# Patient Record
Sex: FEMALE | Race: WHITE | NOT HISPANIC OR LATINO | Employment: OTHER | URBAN - METROPOLITAN AREA
[De-identification: names, ages, dates, MRNs, and addresses within clinical notes are randomized per-mention and may not be internally consistent; named-entity substitution may affect disease eponyms.]

---

## 2017-05-01 ENCOUNTER — TRANSCRIBE ORDERS (OUTPATIENT)
Dept: LAB | Facility: HOSPITAL | Age: 82
End: 2017-05-01

## 2017-05-01 ENCOUNTER — APPOINTMENT (OUTPATIENT)
Dept: LAB | Facility: HOSPITAL | Age: 82
End: 2017-05-01
Payer: COMMERCIAL

## 2017-05-01 ENCOUNTER — GENERIC CONVERSION - ENCOUNTER (OUTPATIENT)
Dept: OTHER | Facility: OTHER | Age: 82
End: 2017-05-01

## 2017-05-01 DIAGNOSIS — I10 ESSENTIAL (PRIMARY) HYPERTENSION: ICD-10-CM

## 2017-05-01 DIAGNOSIS — E78.5 HYPERLIPIDEMIA: ICD-10-CM

## 2017-05-01 DIAGNOSIS — M85.80 OTHER SPECIFIED DISORDERS OF BONE DENSITY AND STRUCTURE, UNSPECIFIED SITE: ICD-10-CM

## 2017-05-01 DIAGNOSIS — E11.65 TYPE 2 DIABETES MELLITUS WITH HYPERGLYCEMIA (HCC): ICD-10-CM

## 2017-05-01 LAB
ALBUMIN SERPL BCP-MCNC: 3.4 G/DL (ref 3.5–5)
ALP SERPL-CCNC: 74 U/L (ref 46–116)
ALT SERPL W P-5'-P-CCNC: 18 U/L (ref 12–78)
ANION GAP SERPL CALCULATED.3IONS-SCNC: 7 MMOL/L (ref 4–13)
AST SERPL W P-5'-P-CCNC: 19 U/L (ref 5–45)
BASOPHILS # BLD AUTO: 0.06 THOUSANDS/ΜL (ref 0–0.1)
BASOPHILS NFR BLD AUTO: 1 % (ref 0–1)
BILIRUB SERPL-MCNC: 0.49 MG/DL (ref 0.2–1)
BUN SERPL-MCNC: 16 MG/DL (ref 5–25)
CALCIUM SERPL-MCNC: 9.5 MG/DL (ref 8.3–10.1)
CHLORIDE SERPL-SCNC: 106 MMOL/L (ref 100–108)
CHOLEST SERPL-MCNC: 155 MG/DL (ref 50–200)
CO2 SERPL-SCNC: 29 MMOL/L (ref 21–32)
CREAT SERPL-MCNC: 0.92 MG/DL (ref 0.6–1.3)
EOSINOPHIL # BLD AUTO: 0.15 THOUSAND/ΜL (ref 0–0.61)
EOSINOPHIL NFR BLD AUTO: 2 % (ref 0–6)
ERYTHROCYTE [DISTWIDTH] IN BLOOD BY AUTOMATED COUNT: 12.3 % (ref 11.6–15.1)
EST. AVERAGE GLUCOSE BLD GHB EST-MCNC: 169 MG/DL
GFR SERPL CREATININE-BSD FRML MDRD: 58.6 ML/MIN/1.73SQ M
GLUCOSE P FAST SERPL-MCNC: 95 MG/DL (ref 65–99)
HBA1C MFR BLD: 7.5 % (ref 4.2–6.3)
HCT VFR BLD AUTO: 44.5 % (ref 34.8–46.1)
HDLC SERPL-MCNC: 41 MG/DL (ref 40–60)
HGB BLD-MCNC: 14.9 G/DL (ref 11.5–15.4)
LDLC SERPL CALC-MCNC: 77 MG/DL (ref 0–100)
LYMPHOCYTES # BLD AUTO: 1.62 THOUSANDS/ΜL (ref 0.6–4.47)
LYMPHOCYTES NFR BLD AUTO: 23 % (ref 14–44)
MCH RBC QN AUTO: 30.6 PG (ref 26.8–34.3)
MCHC RBC AUTO-ENTMCNC: 33.5 G/DL (ref 31.4–37.4)
MCV RBC AUTO: 91 FL (ref 82–98)
MONOCYTES # BLD AUTO: 0.52 THOUSAND/ΜL (ref 0.17–1.22)
MONOCYTES NFR BLD AUTO: 7 % (ref 4–12)
NEUTROPHILS # BLD AUTO: 4.65 THOUSANDS/ΜL (ref 1.85–7.62)
NEUTS SEG NFR BLD AUTO: 67 % (ref 43–75)
NRBC BLD AUTO-RTO: 0 /100 WBCS
PLATELET # BLD AUTO: 213 THOUSANDS/UL (ref 149–390)
PMV BLD AUTO: 11.8 FL (ref 8.9–12.7)
POTASSIUM SERPL-SCNC: 4.5 MMOL/L (ref 3.5–5.3)
PROT SERPL-MCNC: 7.2 G/DL (ref 6.4–8.2)
RBC # BLD AUTO: 4.87 MILLION/UL (ref 3.81–5.12)
SODIUM SERPL-SCNC: 142 MMOL/L (ref 136–145)
TRIGL SERPL-MCNC: 186 MG/DL
WBC # BLD AUTO: 7.01 THOUSAND/UL (ref 4.31–10.16)

## 2017-05-01 PROCEDURE — 85025 COMPLETE CBC W/AUTO DIFF WBC: CPT

## 2017-05-01 PROCEDURE — 80053 COMPREHEN METABOLIC PANEL: CPT

## 2017-05-01 PROCEDURE — 83036 HEMOGLOBIN GLYCOSYLATED A1C: CPT

## 2017-05-01 PROCEDURE — 80061 LIPID PANEL: CPT

## 2017-05-01 PROCEDURE — 36415 COLL VENOUS BLD VENIPUNCTURE: CPT

## 2017-05-10 ENCOUNTER — ALLSCRIPTS OFFICE VISIT (OUTPATIENT)
Dept: OTHER | Facility: OTHER | Age: 82
End: 2017-05-10

## 2017-05-10 ENCOUNTER — GENERIC CONVERSION - ENCOUNTER (OUTPATIENT)
Dept: OTHER | Facility: OTHER | Age: 82
End: 2017-05-10

## 2017-05-16 ENCOUNTER — HOSPITAL ENCOUNTER (OUTPATIENT)
Dept: BONE DENSITY | Facility: MEDICAL CENTER | Age: 82
Discharge: HOME | End: 2017-05-16
Payer: COMMERCIAL

## 2017-05-16 DIAGNOSIS — M85.80 SENILE OSTEOPENIA: ICD-10-CM

## 2017-05-16 DIAGNOSIS — M85.80 OTHER SPECIFIED DISORDERS OF BONE DENSITY AND STRUCTURE, UNSPECIFIED SITE: ICD-10-CM

## 2017-05-16 PROCEDURE — 77080 DXA BONE DENSITY AXIAL: CPT

## 2017-05-18 ENCOUNTER — GENERIC CONVERSION - ENCOUNTER (OUTPATIENT)
Dept: OTHER | Facility: OTHER | Age: 82
End: 2017-05-18

## 2017-10-30 ENCOUNTER — GENERIC CONVERSION - ENCOUNTER (OUTPATIENT)
Dept: OTHER | Facility: OTHER | Age: 82
End: 2017-10-30

## 2017-10-30 ENCOUNTER — TRANSCRIBE ORDERS (OUTPATIENT)
Dept: LAB | Facility: HOSPITAL | Age: 82
End: 2017-10-30

## 2017-10-30 ENCOUNTER — APPOINTMENT (OUTPATIENT)
Dept: LAB | Facility: HOSPITAL | Age: 82
End: 2017-10-30
Payer: COMMERCIAL

## 2017-10-30 DIAGNOSIS — I10 ESSENTIAL (PRIMARY) HYPERTENSION: ICD-10-CM

## 2017-10-30 DIAGNOSIS — E78.5 HYPERLIPIDEMIA: ICD-10-CM

## 2017-10-30 DIAGNOSIS — E55.9 VITAMIN D DEFICIENCY: ICD-10-CM

## 2017-10-30 DIAGNOSIS — E11.65 TYPE 2 DIABETES MELLITUS WITH HYPERGLYCEMIA (HCC): ICD-10-CM

## 2017-10-30 DIAGNOSIS — M85.80 OTHER SPECIFIED DISORDERS OF BONE DENSITY AND STRUCTURE: ICD-10-CM

## 2017-10-30 DIAGNOSIS — M85.80 OTHER SPECIFIED DISORDERS OF BONE DENSITY AND STRUCTURE, UNSPECIFIED SITE: ICD-10-CM

## 2017-10-30 LAB
25(OH)D3 SERPL-MCNC: 49.8 NG/ML (ref 30–100)
ALBUMIN SERPL BCP-MCNC: 3.6 G/DL (ref 3.5–5)
ALP SERPL-CCNC: 78 U/L (ref 46–116)
ALT SERPL W P-5'-P-CCNC: 19 U/L (ref 12–78)
ANION GAP SERPL CALCULATED.3IONS-SCNC: 5 MMOL/L (ref 4–13)
AST SERPL W P-5'-P-CCNC: 21 U/L (ref 5–45)
BILIRUB SERPL-MCNC: 0.48 MG/DL (ref 0.2–1)
BUN SERPL-MCNC: 18 MG/DL (ref 5–25)
CALCIUM SERPL-MCNC: 9.1 MG/DL (ref 8.3–10.1)
CHLORIDE SERPL-SCNC: 106 MMOL/L (ref 100–108)
CHOLEST SERPL-MCNC: 135 MG/DL (ref 50–200)
CO2 SERPL-SCNC: 29 MMOL/L (ref 21–32)
CREAT SERPL-MCNC: 1.05 MG/DL (ref 0.6–1.3)
CREAT UR-MCNC: 102 MG/DL
EST. AVERAGE GLUCOSE BLD GHB EST-MCNC: 171 MG/DL
GFR SERPL CREATININE-BSD FRML MDRD: 50 ML/MIN/1.73SQ M
GLUCOSE P FAST SERPL-MCNC: 101 MG/DL (ref 65–99)
HBA1C MFR BLD: 7.6 % (ref 4.2–6.3)
HDLC SERPL-MCNC: 45 MG/DL (ref 40–60)
LDLC SERPL CALC-MCNC: 45 MG/DL (ref 0–100)
MICROALBUMIN UR-MCNC: <5 MG/L (ref 0–20)
MICROALBUMIN/CREAT 24H UR: <5 MG/G CREATININE (ref 0–30)
POTASSIUM SERPL-SCNC: 4.2 MMOL/L (ref 3.5–5.3)
PROT SERPL-MCNC: 7.1 G/DL (ref 6.4–8.2)
SODIUM SERPL-SCNC: 140 MMOL/L (ref 136–145)
TRIGL SERPL-MCNC: 224 MG/DL
TSH SERPL DL<=0.05 MIU/L-ACNC: 2.7 UIU/ML (ref 0.36–3.74)

## 2017-10-30 PROCEDURE — 83036 HEMOGLOBIN GLYCOSYLATED A1C: CPT

## 2017-10-30 PROCEDURE — 80053 COMPREHEN METABOLIC PANEL: CPT

## 2017-10-30 PROCEDURE — 82043 UR ALBUMIN QUANTITATIVE: CPT

## 2017-10-30 PROCEDURE — 82306 VITAMIN D 25 HYDROXY: CPT

## 2017-10-30 PROCEDURE — 36415 COLL VENOUS BLD VENIPUNCTURE: CPT

## 2017-10-30 PROCEDURE — 84443 ASSAY THYROID STIM HORMONE: CPT

## 2017-10-30 PROCEDURE — 80061 LIPID PANEL: CPT

## 2017-10-30 PROCEDURE — 82570 ASSAY OF URINE CREATININE: CPT

## 2017-10-31 LAB
LEFT EYE DIABETIC RETINOPATHY: NORMAL
RIGHT EYE DIABETIC RETINOPATHY: NORMAL

## 2017-11-14 ENCOUNTER — ALLSCRIPTS OFFICE VISIT (OUTPATIENT)
Dept: OTHER | Facility: OTHER | Age: 82
End: 2017-11-14

## 2017-11-15 NOTE — PROGRESS NOTES
Assessment    1  Diabetes mellitus type 2, uncontrolled (250 02) (E11 65)   2  Hypertension (401 9) (I10)   3  Hyperlipidemia (272 4) (E78 5)   4  Osteopenia (733 90) (M85 80)   5  Vitamin D deficiency (268 9) (E55 9)    Plan  Diabetes mellitus type 2, uncontrolled, Hyperlipidemia, Hypertension, Osteopenia,Vitamin D deficiency    · (1) CBC/PLT/DIFF; Status:Active; Requested for:04May2018;    · (1) COMPREHENSIVE METABOLIC PANEL; Status:Active; Requested for:04May2018;    · (1) HEMOGLOBIN A1C; Status:Active; Requested for:04May2018;    · (1) LIPID PANEL FASTING W DIRECT LDL REFLEX; Status:Active; Requestedfor:04May2018; Health Maintenance    · *VB - Fall Risk Assessment  (Dx Z13 89 Screen for Neurologic Disorder);Status:Complete;   Done: 84YUE9084 08:45AM   · *VB-Depression Screening; Status:Complete;   Done: 75BXS4838 08:45AM  Hyperlipidemia    · Simvastatin 80 MG Oral Tablet; take 1 tablet by mouth every day  Hypertension    · Losartan Potassium 50 MG Oral Tablet; take 1 tablet by mouth every day  Type 2 diabetes mellitus    · Levemir FlexTouch 100 UNIT/ML Subcutaneous Solution Pen-injector; CQLHVH57 UNITS IN THE MORNING AND 34 UNITS AT BEDTIME   · NovoLOG FlexPen 100 UNIT/ML Subcutaneous Solution Pen-injector; HJALMV20 UNITS SUBCUT 3 TIMES A DAY AND ISS ALSO  Vitamin D deficiency    · Vitamin D3 5000 UNIT Oral Capsule; TAKE AS DIRECTED  Unlinked    · Calcium 1200 1481-7601 MG-UNIT Oral Tablet Chewable    Discussion/Summary    Reviewed lab in 10/39055 6 stable<5 msvzff186/224/45/45 low fat upmowhwzhw41 8 normalcurrent meds  specialist as scheduled  flu shot this season  prevnar 4/2015  Got pneumovax 2016  zostavax in 10/2014 5/2017, osteopenia, stable  Pt is on calcium/vitD and exercise regularly  precautions  in 6 months with labs  Possible side effects of new medications were reviewed with the patient/guardian today  The treatment plan was reviewed with the patient/guardian   The patient/guardian understands and agrees with the treatment plan      Chief Complaint  Patient presents for a 6 month follow up      History of Present Illness  Pt is here by herself  HgA1C 7 6 stable from last time  Pt states she is on levemir 37u am and 34u pm  She use novolog ISS, she does not use 20u before meals because hypoglycemia episode  opthalmology in [facility]  Had Left cataract surgery in 5/2014  Right eye cannot see  Last exam was 10/2017  [facility] retinal specialist also per pt podiatry Dr Maurice Live Q 3-4 months  Refused to check foot today  at home 130/80  Denies SOB, CP, headache etc lives by herself  Does all ADL's  Still driving  recent falls  depression  The patient states her hyperlipidemia has been under good control since the last visit  Comorbid Illnesses: diabetes mellitus-- and-- hypertension  Symptoms: The patient is currently asymptomatic  Medications: the patient is adherent with her medication regimen  -- She denies medication side effects  the patient's LDL goal is 100 mg/dL  -- the patient's last LDL was 45 mg/dL  -- 10/2017  The patient presents for follow-up of essential hypertension  The patient states she has been doing well with her blood pressure control since the last visit  Comorbid Illnesses: retinopathy  Symptoms: The patient is currently asymptomatic  Home monitoring: The patient checks her blood pressure sporadically  Blood pressure control has been good  Lifestyle: Diet: She consumes a diverse and healthy diet  Weight Issues: She has weight concerns  Exercise: She exercises regularly  Smoking: She does not use tobacco Alcohol: She denies alcohol use  Drug Use: She denies drug use  Medications: the patient is adherent with her medication regimen  -- She denies medication side effects  The patient states she has been stable with her Type II Diabetes control since the last visit  Comorbid Illnesses: hypertension-- and-- hyperlipidemia   Complications: retinopathy, but-- no peripheral neuropathy,-- no nephropathy-- and-- no coronary artery disease  Symptoms: The patient is currently asymptomatic  Home monitoring: The patient checks her blood sugar sporadically  Lifestyle: Diet: She consumes a diverse and healthy diet  Weight Issues: She has weight concerns  Exercise: She exercises regularly  Smoking: She does not use tobacco Alcohol: She denies alcohol use  Drug Use: She denies drug use  Medications: the patient is adherent with her medication regimen  -- She denies medication side effects  Review of Systems   Constitutional: No fever, no chills, feels well, no tiredness, no recent weight gain or weight loss  ENT: no complaints of earache, no loss of hearing, no nose bleeds, no nasal discharge, no sore throat, no hoarseness  Cardiovascular: No complaints of slow heart rate, no fast heart rate, no chest pain, no palpitations, no leg claudication, no lower extremity edema  Respiratory: No complaints of shortness of breath, no wheezing, no cough, no SOB on exertion, no orthopnea, no PND  Gastrointestinal: No complaints of abdominal pain, no constipation, no nausea or vomiting, no diarrhea, no bloody stools  Musculoskeletal: No complaints of arthralgias, no myalgias, no joint swelling or stiffness, no limb pain or swelling  Preventive Quality 65 and Older: The patient is currently asymptomatic Symptoms Include: no recent fall       Active Problems  1  Allergic rhinitis (477 9) (J30 9)   2  Alopecia (704 00) (L65 9)   3  Carpal tunnel syndrome, unspecified laterality (354 0) (G56 00)   4  Diabetes mellitus type 2, uncontrolled (250 02) (E11 65)   5  Esophageal reflux (530 81) (K21 9)   6  Gallstone (574 20) (K80 20)   7  Glaucoma (365 9) (H40 9)   8  Hyperlipidemia (272 4) (E78 5)   9  Hypertension (401 9) (I10)   10  Medicare annual wellness visit, initial (V70 0) (Z00 00)   11  Need for pneumococcal vaccination (V03 82) (Z23)   12  Need for prophylactic vaccination and inoculation against influenza (V04 81) (Z23)   13  Need for vaccination with 13-polyvalent pneumococcal conjugate vaccine (V03 82) (Z23)   14  Osteoarthritis (715 90) (M19 90)   15  Osteopenia (733 90) (M85 80)   16  Screening for osteoporosis (V82 81) (Z13 820)   17  Spinal stenosis (724 00) (M48 00)   18  Type 2 diabetes mellitus (250 00) (E11 9)   19  Type 2 diabetes mellitus with hyperglycemia (250 00) (E11 65)   20  Visit for pre-operative examination (V72 84) (Z01 818)   21  Vitamin D deficiency (268 9) (E55 9)    Past Medical History  1  History of Acute cholecystitis (575 0) (K81 0)   2  History of Carpal tunnel syndrome, unspecified laterality (354 0) (G56 00)   3  History of Choledocholithiasis (574 50) (K80 50)   4  History of CT Liver Cyst Multiple   5  History of Diverticulosis (562 10) (K57 90)   6  History of diverticulitis of colon (V12 79) (Z87 19)   7  History of sciatica (V12 49) (Z86 69)   8  History of Inguinal hernia (550 90) (K40 90)    Surgical History  1  History of Diagnostic Esophagogastroduodenoscopy   2  History of Hemorrhoidectomy   3  History of Neuroplasty Median Nerve At Carpal Tunnel   4  History of Rectal Surgery    Family History  Mother    1  Family history of Acute Myocardial Infarction (V17 3)   2  Family history of Coronary Artery Disease (V17 49)   3  Family history of Diabetes Mellitus (V18 0)   4  Family history of Hypertension (V17 49)   5  Family history of Stroke Syndrome (V17 1)  Father    6  Family history of Stroke Syndrome (V17 1)    Social History     · Denied: History of Alcohol   · Always uses seat belt   · Never A Smoker   · No drug use   · Retired   ·     Current Meds   1  Alphagan P 0 1 % Ophthalmic Solution; Instill one drop to the right eye 3 times daily; Therapy: 36RCP3569 to )  Requested for: 27Uis5404; Last Rx:18Apr2013 Ordered   2  BD Pen Needle Short U/F 31G X 8 MM Miscellaneous; 4 x daily; Therapy: 41GBL5876 to ([phone])  Requested for: 0490 39 07 81;  Last Rx:29Oct2015 Ordered   3  Calcium 1200 3280-1099 MG-UNIT Oral Tablet Chewable; Therapy: 56MBP0683 to Recorded   4  Latanoprost 0 005 % Ophthalmic Solution; Instill one drop in both eyes at bedtime; Therapy: 86Vmq8690 to (Evaluate:15Oct2013); Last Rx:18Apr2013 Ordered   5  Levemir FlexTouch 100 UNIT/ML Subcutaneous Solution Pen-injector; INJECT 37 UNITS IN THE MORNING AND 34 UNITS AT BEDTIME; Therapy: 81NII0534 to (Evaluate:20Apr2018)  Requested for: 38COX3769; Last Rx:04Zvx3060 Ordered   6  Losartan Potassium 50 MG Oral Tablet; take 1 tablet by mouth every day; Therapy: 09LOC8669 to (Evaluate:72Qfp5446)  Requested for: 00Wed5133; Last Rx:79Ylm3099 Ordered   7  NovoLOG FlexPen 100 UNIT/ML Subcutaneous Solution Pen-injector; INJECT 20 UNITS SUBCUT 3 TIMES A DAY AND ISS ALSO; Therapy: 59FFV6848 to (Evaluate:11Mar2017)  Requested for: 41SEZ4535; Last Rx:81Ztb8852 Ordered   8  OneTouch Verio In [employer]; TEST 3 TIMES DAILY; Therapy: 28Apr2017 to ([phone])  Requested for: 28Apr2017; Last Rx:28Apr2017 Ordered   9  Simvastatin 80 MG Oral Tablet; take 1 tablet by mouth every day; Therapy: 86HFC7662 to (Evaluate:60Ood9271)  Requested for: 79Szz0178; Last Rx:44Zzr6314 Ordered   10  Vitamin D3 5000 UNIT Oral Capsule; TAKE AS DIRECTED; Therapy: 53WRA8474 to (Evaluate:06Nov2017); Last Rx:29Ovk8609 Ordered    Allergies  1  ACE Inhibitors   2  Glucophage TABS    Vitals  Vital Signs    Recorded: 19BLD4672 08:20AM   Temperature 97 6 F, Tympanic   Heart Rate 74, L Radial   Pulse Quality Normal, L Radial   Respiration Quality Normal   Respiration 16   Systolic 678, LUE, Sitting   Diastolic 62, LUE, Sitting   Height 5 ft 1 in   Weight 148 lb 8 oz   BMI Calculated 28 06   BSA Calculated 1 66   Pain Scale 0       Physical Exam   Constitutional  General appearance: No acute distress, well appearing and well nourished  Pulmonary  Respiratory effort: No increased work of breathing or signs of respiratory distress     Auscultation of lungs: Clear to auscultation  Cardiovascular  Auscultation of heart: Normal rate and rhythm, normal S1 and S2, without murmurs  Examination of extremities for edema and/or varicosities: Normal    Carotid pulses: Normal    Abdomen  Abdomen: Non-tender, no masses  Liver and spleen: No hepatomegaly or splenomegaly  Lymphatic  Palpation of lymph nodes in neck: No lymphadenopathy  Musculoskeletal  Gait and station: Normal          Results/Data  (1) COMPREHENSIVE METABOLIC PANEL 85YVH1096 39:09FJ Ava Elliott   TW Order Number: [id number]     Test Name Result Flag Reference   SODIUM 140 mmol/L  136-145   POTASSIUM 4 2 mmol/L  3 5-5 3   CHLORIDE 106 mmol/L  100-108   CARBON DIOXIDE 29 mmol/L  21-32   ANION GAP (CALC) 5 mmol/L  4-13   BLOOD UREA NITROGEN 18 mg/dL  5-25   CREATININE 1 05 mg/dL  0 60-1 30   Standardized to IDMS reference method   CALCIUM 9 1 mg/dL  8 3-10 1   BILI, TOTAL 0 48 mg/dL  0 20-1 00   ALK PHOSPHATAS 78 U/L     ALT (SGPT) 19 U/L  12-78   Specimen collection should occur prior to Sulfasalazine and/or Sulfapyridine administration due to the potential for falsely depressed results  AST(SGOT) 21 U/L  5-45   Specimen collection should occur prior to Sulfasalazine administration due to the potential for falsely depressed results  ALBUMIN 3 6 g/dL  3 5-5 0   TOTAL PROTEIN 7 1 g/dL  6 4-8 2   eGFR 50 ml/min/1 73sq m       [facility] Disease Education Program recommendations are as follows: GFR calculation is accurate only with a steady state creatinine Chronic Kidney disease less than 60 ml/min/1 73 sq  meters Kidney failure less than 15 ml/min/1 73 sq  meters  GLUCOSE FASTING 101 mg/dL H 65-99   Specimen collection should occur prior to Sulfasalazine administration due to the potential for falsely depressed results  Specimen collection should occur prior to Sulfapyridine administration due to the potential for falsely elevated results       (1) HEMOGLOBIN A1C 36PVO4723 08: Gretchen Guajardo    Order Number: [id number]     Test Name Result Flag Reference   HEMOGLOBIN A1C 7 6 % H 4 2-6 3   EST  AVG  GLUCOSE 171 mg/dl       (1) LIPID PANEL FASTING W DIRECT LDL REFLEX 30Oct2017 08:03AM Momo Panda    Order Number: [id number]     Test Name Result Flag Reference   CHOLESTEROL 135 mg/dL     LDL CHOLESTEROL CALCULATED 45 mg/dL  0-100     Triglyceride:       Normal <150 mg/dl  Borderline High 150-199 mg/dl  High 200-499 mg/dl  Very High >499 mg/dl   Cholesterol:      Desirable <200 mg/dl   Borderline High 200-239 mg/dl   High >239 mg/dl   HDL Cholesterol:      High>59 mg/dL   Low <41 mg/dL   HDL Cholesterol:      High>59 mg/dL   Low <41 mg/dL   This screening LDL is a calculated result  It does not have the accuracy of the Direct Measured LDL in the monitoring of patients with hyperlipidemia and/or statin therapy  Direct Measure LDL (CSR557) must be ordered separately in these patients  TRIGLYCERIDES 224 mg/dL H <=150   Specimen collection should occur prior to N-Acetylcysteine or Metamizole administration due to the potential for falsely depressed results  HDL,DIRECT 45 mg/dL  40-60   Specimen collection should occur prior to Metamizole administration due to the potential for falsley depressed results  (1) MICROALBUMIN CREATININE RATIO, RANDOM URINE 30Oct2017 08:03AM Momo Panda    Order Number: [id number]     Test Name Result Flag Reference   MICROALBUMIN/ CREAT R <5 mg/g creatinine  0-30   MICROALBUMIN,URINE <5 0 mg/L  0 0-20 0   CREATININE URINE 102 0 mg/dL       (1) TSH WITH FT4 REFLEX 30Oct2017 08:03AM Momo Panda    Order Number: [id number]     Test Name Result Flag Reference   TSH 2 700 uIU/mL  0 358-3 740     Patients undergoing fluorescein dye angiography may retain small amounts of fluorescein in the body for 48-72 hours post procedure  Samples containing fluorescein can produce falsely depressed TSH values   If the patient had this procedure,a specimen should be resubmitted post fluorescein clearance  The recommended reference ranges for TSH during pregnancy are as follows: First trimester 0 1 to 2 5 uIU/mL Second trimester  0 2 to 3 0 uIU/mL Third trimester 0 3 to 3 0 uIU/m     (1) VITAMIN D 25-HYDROXY 30Oct2017 08:03AM Andrzej Lucia    Order Number: [id number]     Test Name Result Flag Reference   VIT D 25-HYDROX 49 8 ng/mL  30 0-100 0     This assay is a certified procedure of the CDC Vitamin D Standardization Certification Program (VDSCP)   Deficiency <20ng/ml  Insufficiency 20-30ng/ml  Sufficient  ng/ml   *Patients undergoing fluorescein dye angiography may retain small amounts of fluorescein in the body for 48-72 hours post procedure  Samples containing fluorescein can produce falsely elevated Vitamin D values  If the patient had this procedure, a specimen should be resubmitted post fluorescein clearance  Health Management  Type 2 diabetes mellitus   *VB - Eye Exam; every 2 years; Last 71TNO4730; Next Due: 48RRL8283; Active    Signatures   Electronically signed by :  Melody Woods MD; Nov 14 2017  8:45AM EST                       (Author)

## 2018-01-09 NOTE — RESULT NOTES
Message   DW pt on 11/10/2016 OV  Verified Results  (1) COMPREHENSIVE METABOLIC PANEL 38DTB8027 45:81LH [facility] Order Number: [id number]     Test Name Result Flag Reference   GLUCOSE,RANDM 109 mg/dL     If the patient is fasting, the ADA then defines impaired fasting glucose as > 100 mg/dL and diabetes as > or equal to 123 mg/dL  SODIUM 139 mmol/L  136-145   POTASSIUM 4 5 mmol/L  3 5-5 3   CHLORIDE 103 mmol/L  100-108   CARBON DIOXIDE 32 mmol/L  21-32   ANION GAP (CALC) 4 mmol/L  4-13   BLOOD UREA NITROGEN 18 mg/dL  5-25   CREATININE 1 08 mg/dL  0 60-1 30   Standardized to IDMS reference method   CALCIUM 9 0 mg/dL  8 3-10 1   BILI, TOTAL 0 51 mg/dL  0 20-1 00   ALK PHOSPHATAS 83 U/L     ALT (SGPT) 21 U/L  12-78   AST(SGOT) 17 U/L  5-45   ALBUMIN 3 8 g/dL  3 5-5 0   TOTAL PROTEIN 7 7 g/dL  6 4-8 2   eGFR Non-African American 48 8 ml/min/1 73sq [facility] [employer] Disease Education Program recommendations are as follows:  GFR calculation is accurate only with a steady state creatinine  Chronic Kidney disease less than 60 ml/min/1 73 sq  meters  Kidney failure less than 15 ml/min/1 73 sq  meters  (1) HEMOGLOBIN A1C 23Oct2016 08:14AM [facility] Order Number: [id number]     Test Name Result Flag Reference   HEMOGLOBIN A1C 7 6 % H 4 2-6 3   EST  AVG   GLUCOSE 171 mg/dl       (1) LIPID PANEL FASTING W DIRECT LDL REFLEX 23Oct2016 08:14AM [facility] Order Number: [id number]     Test Name Result Flag Reference   CHOLESTEROL 163 mg/dL     LDL CHOLESTEROL CALCULATED 80 mg/dL  0-100   Triglyceride:         Normal              <150 mg/dl       Borderline High    150-199 mg/dl       High               200-499 mg/dl       Very High          >499 mg/dl  Cholesterol:         Desirable        <200 mg/dl      Borderline High  200-239 mg/dl      High             >239 mg/dl  HDL Cholesterol:        High    >59 mg/dL      Low     <41 mg/dL  LDL Cholesterol:        Optimal          <100 mg/dl        Near Optimal     100-129 mg/dl        Above Optimal          Borderline High   130-159 mg/dl          High              160-189 mg/dl          Very High        >189 mg/dl  LDL CALCULATED:    This screening LDL is a calculated result  It does not have the accuracy of the Direct Measured LDL in the monitoring of patients with hyperlipidemia and/or statin therapy  Direct Measure LDL (MNO251) must be ordered separately in these patients  TRIGLYCERIDES 190 mg/dL H <=150   Specimen collection should occur prior to N-Acetylcysteine or Metamizole administration due to the potential for falsely depressed results  HDL,DIRECT 45 mg/dL  40-60   Specimen collection should occur prior to Metamizole administration due to the potential for falsely depressed results  (1) MICROALBUMIN CREATININE RATIO, RANDOM URINE 23Oct2016 08:14AM [employer] Order Number: [id number]     Test Name Result Flag Reference   MICROALBUMIN/ CREAT R 6 mg/g creatinine  0-30   MICROALBUMIN,URINE 5 5 mg/L  0 0-20 0   CREATININE URINE 87 3 mg/dL       (1) TSH WITH FT4 REFLEX 23Oct2016 08:14AM [employer] Order Number: [id number]     Test Name Result Flag Reference   TSH 2 650 uIU/mL  0 358-3 740   Patients undergoing fluorescein dye angiography may retain small amounts of fluorescein in the body for 48-72 hours post procedure  Samples containing fluorescein can produce falsely depressed TSH values  If the patient had this procedure,a specimen should be resubmitted post fluorescein clearance            The recommended reference ranges for TSH during pregnancy are as follows:  First trimester 0 1 to 2 5 uIU/mL  Second trimester  0 2 to 3 0 uIU/mL  Third trimester 0 3 to 3 0 uIU/m     (1) VITAMIN D 25-HYDROXY 23Oct2016 08:14AM [employer] Order Number: [id number]     Test Name Result Flag Reference   VIT D 25-HYDROX 63 1 ng/mL  30 0-100 0   This assay is a certified procedure of the CDC Vitamin D Standardization Certification Program (VDSCP)     Deficiency <20ng/ml   Insufficiency 20-30ng/ml   Sufficient  ng/ml     *Patients undergoing fluorescein dye angiography may retain small amounts of fluorescein in the body for 48-72 hours post procedure  Samples containing fluorescein can produce falsely elevated Vitamin D values  If the patient had this procedure, a specimen should be resubmitted post fluorescein clearance

## 2018-01-10 NOTE — RESULT NOTES
Message   DW pt on 5/5/2016 OV  Verified Results  (1) CBC/PLT/DIFF 20Apr2016 07:25AM Michelle Martínez     Test Name Result Flag Reference   WBC COUNT 6 54 Thousand/uL  4 31-10 16   RBC COUNT 4 98 Million/uL  3 81-5 12   HEMOGLOBIN 14 9 g/dL  11 5-15 4   HEMATOCRIT 45 1 %  34 8-46  1   MCV 91 fL  82-98   MCH 29 9 pg  26 8-34 3   MCHC 33 0 g/dL  31 4-37 4   RDW 12 0 %  11 6-15 1   MPV 10 7 fL  8 9-12 7   PLATELET COUNT 700 Thousands/uL  149-390   nRBC AUTOMATED 0 /100 WBCs     NEUTROPHILS RELATIVE PERCENT 58 %  43-75   LYMPHOCYTES RELATIVE PERCENT 29 %  14-44   MONOCYTES RELATIVE PERCENT 8 %  4-12   EOSINOPHILS RELATIVE PERCENT 4 %  0-6   BASOPHILS RELATIVE PERCENT 1 %  0-1   NEUTROPHILS ABSOLUTE COUNT 3 73 Thousands/µL  1 85-7 62   LYMPHOCYTES ABSOLUTE COUNT 1 91 Thousands/µL  0 60-4 47   MONOCYTES ABSOLUTE COUNT 0 54 Thousand/µL  0 17-1 22   EOSINOPHILS ABSOLUTE COUNT 0 26 Thousand/µL  0 00-0 61   BASOPHILS ABSOLUTE COUNT 0 09 Thousands/µL  0 00-0 10     (1) COMPREHENSIVE METABOLIC PANEL 38EET7967 62:72CU Michelle Martínez   National Kidney Disease Education Program recommendations are as follows:  GFR calculation is accurate only with a steady state creatinine  Chronic Kidney disease less than 60 ml/min/1 73 sq  meters  Kidney failure less than 15 ml/min/1 73 sq  meters  Test Name Result Flag Reference   GLUCOSE,RANDM 99 mg/dL     If the patient is fasting, the ADA then defines impaired fasting glucose as > 100 mg/dL and diabetes as > or equal to 123 mg/dL     SODIUM 140 mmol/L  136-145   POTASSIUM 4 3 mmol/L  3 5-5 3   CHLORIDE 105 mmol/L  100-108   CARBON DIOXIDE 27 mmol/L  21-32   ANION GAP (CALC) 8 mmol/L  4-13   BLOOD UREA NITROGEN 24 mg/dL  5-25   CREATININE 0 91 mg/dL  0 60-1 30   Standardized to IDMS reference method   CALCIUM 9 1 mg/dL  8 3-10 1   BILI, TOTAL 0 60 mg/dL  0 20-1 00   ALK PHOSPHATAS 78 U/L     ALT (SGPT) 19 U/L  12-78   AST(SGOT) 17 U/L  5-45   ALBUMIN 3 9 g/dL  3 5-5 0   TOTAL PROTEIN 6 9 g/dL  6 4-8 2   eGFR Non-African American 59 5 ml/min/1 73sq m       (1) LIPID PANEL FASTING W DIRECT LDL REFLEX 20Apr2016 07:25AM Patrick Cowden   Triglyceride:         Normal              <150 mg/dl       Borderline High    150-199 mg/dl       High               200-499 mg/dl       Very High          >499 mg/dl  Cholesterol:         Desirable        <200 mg/dl      Borderline High  200-239 mg/dl      High             >239 mg/dl  HDL Cholesterol:        High    >59 mg/dL      Low     <41 mg/dL  LDL Cholesterol:        Optimal          <100 mg/dl         Near Optimal     100-129 mg/dl        Above Optimal          Borderline High   130-159 mg/dl          High              160-189 mg/dl          Very High        >189 mg/dl  LDL CALCULATED:    This screening LDL is a calculated result  It does not have the accuracy of the Direct Measured LDL in the monitoring of patients with hyperlipidemia and/or statin therapy  Direct Measure LDL (ISP400) must be ordered separately in these patients  Test Name Result Flag Reference   CHOLESTEROL 164 mg/dL     LDL CHOLESTEROL CALCULATED 84 mg/dL  0-100   TRIGLYCERIDES 172 mg/dL H <=150   Specimen collection should occur prior to N-Acetylcysteine or Metamizole administration due to the potential for falsely depressed results  HDL,DIRECT 46 mg/dL  40-60   Specimen collection should occur prior to Metamizole administration due to the potential for falsely depressed results       (1) HEMOGLOBIN A1C 20Apr2016 07:25AM Nimesh Napier   5 7-6 4% impaired fasting glucose  >=6 5% diagnosis of diabetes    Falsely low levels are seen in conditions linked to short RBC life span-  hemolytic anemia, and splenomegaly  Falsely elevated levels are seen in situations where there is an increased production of RBC- receipt of erythropoietin or blood transfusions  Adopted from ADA-Clinical Practice Recommendations     Test Name Result Flag Reference   HEMOGLOBIN A1C 7 7 % H 4 0-5 6 EST  AVG   GLUCOSE 174 mg/dl

## 2018-01-11 NOTE — RESULT NOTES
Verified Results  (1) COMPREHENSIVE METABOLIC PANEL [id number] 17:25 Patrick Cowden   TW Order Number: [id number]     Test Name Result Flag Reference   SODIUM 140 mmol/L  136-145   POTASSIUM 4 2 mmol/L  3 5-5 3   CHLORIDE 106 mmol/L  100-108   CARBON DIOXIDE 29 mmol/L  21-32   ANION GAP (CALC) 5 mmol/L  4-13   BLOOD UREA NITROGEN 18 mg/dL  5-25   CREATININE 1 05 mg/dL  0 60-1 30   Standardized to IDMS reference method   CALCIUM 9 1 mg/dL  8 3-10 1   BILI, TOTAL 0 48 mg/dL  0 20-1 00   ALK PHOSPHATAS 78 U/L     ALT (SGPT) 19 U/L  12-78   Specimen collection should occur prior to Sulfasalazine and/or Sulfapyridine administration due to the potential for falsely depressed results  AST(SGOT) 21 U/L  5-45   Specimen collection should occur prior to Sulfasalazine administration due to the potential for falsely depressed results  ALBUMIN 3 6 g/dL  3 5-5 0   TOTAL PROTEIN 7 1 g/dL  6 4-8 2   eGFR 50 ml/min/1 73sq [facility] Disease Education Program recommendations are as follows:  GFR calculation is accurate only with a steady state creatinine  Chronic Kidney disease less than 60 ml/min/1 73 sq  meters  Kidney failure less than 15 ml/min/1 73 sq  meters  GLUCOSE FASTING 101 mg/dL H 65-99   Specimen collection should occur prior to Sulfasalazine administration due to the potential for falsely depressed results  Specimen collection should occur prior to Sulfapyridine administration due to the potential for falsely elevated results  (1) HEMOGLOBIN A1C 30Oct2017 08:03AM Patrick Cowden    Order Number: [id number]     Test Name Result Flag Reference   HEMOGLOBIN A1C 7 6 % H 4 2-6 3   EST  AVG   GLUCOSE 171 mg/dl       (1) LIPID PANEL FASTING W DIRECT LDL REFLEX 30Oct2017 08:03AM Patrick Cowden    Order Number: [id number]     Test Name Result Flag Reference   CHOLESTEROL 135 mg/dL     LDL CHOLESTEROL CALCULATED 45 mg/dL  0-100   Triglyceride:        Normal <150 mg/dl Borderline High 150-199 mg/dl   High 200-499 mg/dl   Very High >499 mg/dl      Cholesterol:       Desirable <200 mg/dl    Borderline High 200-239 mg/dl    High >239 mg/dl      HDL Cholesterol:       High>59 mg/dL    Low <41 mg/dL      HDL Cholesterol:       High>59 mg/dL    Low <41 mg/dL      This screening LDL is a calculated result  It does not have the accuracy of the Direct Measured LDL in the monitoring of patients with hyperlipidemia and/or statin therapy  Direct Measure LDL (ZJD995) must be ordered separately in these patients  TRIGLYCERIDES 224 mg/dL H <=150   Specimen collection should occur prior to N-Acetylcysteine or Metamizole administration due to the potential for falsely depressed results  HDL,DIRECT 45 mg/dL  40-60   Specimen collection should occur prior to Metamizole administration due to the potential for falsley depressed results  (1) MICROALBUMIN CREATININE RATIO, RANDOM URINE 30Oct2017 08:03AM [facility] Order Number: [id number]     Test Name Result Flag Reference   MICROALBUMIN/ CREAT R <5 mg/g creatinine  0-30   MICROALBUMIN,URINE <5 0 mg/L  0 0-20 0   CREATININE URINE 102 0 mg/dL       (1) TSH WITH FT4 REFLEX 30Oct2017 08:03AM [facility] Order Number: [id number]     Test Name Result Flag Reference   TSH 2 700 uIU/mL  0 358-3 740   Patients undergoing fluorescein dye angiography may retain small amounts of fluorescein in the body for 48-72 hours post procedure  Samples containing fluorescein can produce falsely depressed TSH values  If the patient had this procedure,a specimen should be resubmitted post fluorescein clearance            The recommended reference ranges for TSH during pregnancy are as follows:  First trimester 0 1 to 2 5 uIU/mL  Second trimester  0 2 to 3 0 uIU/mL  Third trimester 0 3 to 3 0 uIU/m     (1) VITAMIN D 25-HYDROXY 30Oct2017 08:03AM [facility] Order Number: [id number]     Test Name Result Flag Reference   VIT D 25-HYDROX 49 8 ng/mL  30 0-100 0   This assay is a certified procedure of the CDC Vitamin D Standardization Certification Program (VDSCP)     Deficiency <20ng/ml   Insufficiency 20-30ng/ml   Sufficient  ng/ml     *Patients undergoing fluorescein dye angiography may retain small amounts of fluorescein in the body for 48-72 hours post procedure  Samples containing fluorescein can produce falsely elevated Vitamin D values  If the patient had this procedure, a specimen should be resubmitted post fluorescein clearance

## 2018-01-12 NOTE — RESULT NOTES
Verified Results  DXA FOLLOW UP (NO CHARGE) [id number] 01:45PM Angus Montenegro Order Number: [id number]    - Patient Instructions: To schedule this appointment, please contact Central Scheduling at [phone]  Test Name Result Flag Reference   DXA FOLLOW UP (NO CHARGE) (Report)     CENTRAL DXA SCAN     CLINICAL HISTORY:  81year old post-menopausal  female risk factors include osteopenia  Estrogen deficiency  TECHNIQUE: Bone densitometry was performed using a [employer] W bone densitometer  Regions of interest appear properly placed  There are no obvious fractures or other confounding variables which could limit the study  None     COMPARISON: Follow-up, additional study in 2014 at a different location  RESULTS:    LUMBAR SPINE: L1-L4:   BMD 1 069 gm/cm2   T-score normal, 0 2 and 6% higher than 2009  In 2014 the T score was also normal, 0 3    Z-score +2 9     LEFT TOTAL HIP:   BMD 0 874 gm/cm2   T-score normal, -0 6 and on the prior 2014 study the T score was -1 0    Z-score +1 6     LEFT FEMORAL NECK:   BMD 0 702 gm/cm2   T-score below normal, -1 3 and 5% less than 2009  The T score in 2014 was below normal, -1 2 and similar to the current result  Z-score +1 0     The forearm BMD is 0 566 and the T score is below normal, -2 1 and 5% less than 2009  The Z score is +1 1  IMPRESSION:   1  Based on the [facility] classification, this study identifies a diagnosis of moderate osteopenia at the forearm area and the patient is considered at low risk for fracture  2  A daily intake of calcium of at least 1200 mg and vitamin D, 800-1000 IU, as well as weight bearing and muscle strengthening exercise, fall prevention and avoidance of tobacco and excessive alcohol intake as basic preventive measures are recommended  3  Repeat DXA scan on the same equipment in 18-24 months as clinically indicated         The 10 year risk of hip fracture is 2 9%, with the 10 year risk of major osteoporotic fracture being 13%, as calculated by the WHO fracture risk assessment tool (FRAX)  The current NOF guidelines recommend treating patients with FRAX 10 year risk score    of >3% for hip fracture and >20% for major osteoporotic fracture  WHO CLASSIFICATION:   Normal (a T-score of -1 0 or higher)   Low bone mineral density (a T-score of less than -1 0 but higher than -2 5)   Osteoporosis (a T-score of -2 5 or less)   Severe osteoporosis (a T-score of -2 5 or less with a fragility fracture)      Thank you for allowing us the opportunity to participate in your patient care  The expanded DEXA report will no longer be arriving in your mail  If you desire to view the full report please contact [facility] or access the PACS system         Workstation performed: [id number]     Signed by:   Cuauhtemoc Inman MD   5/18/17

## 2018-01-13 VITALS
HEIGHT: 61 IN | TEMPERATURE: 97.6 F | DIASTOLIC BLOOD PRESSURE: 62 MMHG | HEART RATE: 74 BPM | WEIGHT: 148.5 LBS | RESPIRATION RATE: 16 BRPM | SYSTOLIC BLOOD PRESSURE: 110 MMHG | BODY MASS INDEX: 28.04 KG/M2

## 2018-01-14 NOTE — RESULT NOTES
Verified Results  (1) CBC/PLT/DIFF [id number] 09:21AM Korin Gilbert    Order Number: [id number]     Test Name Result Flag Reference   WBC COUNT 7 01 Thousand/uL  4 31-10 16   RBC COUNT 4 87 Million/uL  3 81-5 12   HEMOGLOBIN 14 9 g/dL  11 5-15 4   HEMATOCRIT 44 5 %  34 8-46  1   MCV 91 fL  82-98   MCH 30 6 pg  26 8-34 3   MCHC 33 5 g/dL  31 4-37 4   RDW 12 3 %  11 6-15 1   MPV 11 8 fL  8 9-12 7   PLATELET COUNT 133 Thousands/uL  149-390   nRBC AUTOMATED 0 /100 WBCs     NEUTROPHILS RELATIVE PERCENT 67 %  43-75   LYMPHOCYTES RELATIVE PERCENT 23 %  14-44   MONOCYTES RELATIVE PERCENT 7 %  4-12   EOSINOPHILS RELATIVE PERCENT 2 %  0-6   BASOPHILS RELATIVE PERCENT 1 %  0-1   NEUTROPHILS ABSOLUTE COUNT 4 65 Thousands/? ??L  1 85-7 62   LYMPHOCYTES ABSOLUTE COUNT 1 62 Thousands/? ??L  0 60-4 47   MONOCYTES ABSOLUTE COUNT 0 52 Thousand/? ??L  0 17-1 22   EOSINOPHILS ABSOLUTE COUNT 0 15 Thousand/? ??L  0 00-0 61   BASOPHILS ABSOLUTE COUNT 0 06 Thousands/? ??L  0 00-0 10   - Patient Instructions: This bloodwork is non-fasting  Please drink two glasses of water morning of bloodwork       (1) COMPREHENSIVE METABOLIC PANEL [id number] 61:83NY Korin Gilbert    Order Number: [id number]     Test Name Result Flag Reference   SODIUM 142 mmol/L  136-145   POTASSIUM 4 5 mmol/L  3 5-5 3   CHLORIDE 106 mmol/L  100-108   CARBON DIOXIDE 29 mmol/L  21-32   ANION GAP (CALC) 7 mmol/L  4-13   BLOOD UREA NITROGEN 16 mg/dL  5-25   CREATININE 0 92 mg/dL  0 60-1 30   Standardized to IDMS reference method   CALCIUM 9 5 mg/dL  8 3-10 1   BILI, TOTAL 0 49 mg/dL  0 20-1 00   ALK PHOSPHATAS 74 U/L     ALT (SGPT) 18 U/L  12-78   AST(SGOT) 19 U/L  5-45   ALBUMIN 3 4 g/dL L 3 5-5 0   TOTAL PROTEIN 7 2 g/dL  6 4-8 2   eGFR Non-African American 58 6 ml/min/1 73sq m     National Kidney Disease Education Program recommendations are as follows:  GFR calculation is accurate only with a steady state creatinine  Chronic Kidney disease less than 60 ml/min/1 73 sq  meters  Kidney failure less than 15 ml/min/1 73 sq  meters  GLUCOSE FASTING 95 mg/dL  65-99     (1) HEMOGLOBIN A1C [id number] 09:21AM Marlene Rocheabiodun    Order Number: [id number]     Test Name Result Flag Reference   HEMOGLOBIN A1C 7 5 % H 4 2-6 3   EST  AVG  GLUCOSE 169 mg/dl       (1) LIPID PANEL FASTING W DIRECT LDL REFLEX [id number] 09:21AM Marlene Rocheabiodun   TW Order Number: [id number]     Test Name Result Flag Reference   CHOLESTEROL 155 mg/dL     LDL CHOLESTEROL CALCULATED 77 mg/dL  0-100   - Patient Instructions: This is a fasting blood test  Water, black tea or black coffee only after 9:00pm the night before test   Drink 2 glasses of water the morning of test       Triglyceride:         Normal              <150 mg/dl       Borderline High    150-199 mg/dl       High               200-499 mg/dl       Very High          >499 mg/dl  Cholesterol:         Desirable        <200 mg/dl      Borderline High  200-239 mg/dl      High             >239 mg/dl  HDL Cholesterol:        High    >59 mg/dL      Low     <41 mg/dL  LDL Cholesterol:        Optimal          <100 mg/dl        Near Optimal     100-129 mg/dl        Above Optimal          Borderline High   130-159 mg/dl          High              160-189 mg/dl          Very High        >189 mg/dl  LDL CALCULATED:    This screening LDL is a calculated result  It does not have the accuracy of the Direct Measured LDL in the monitoring of patients with hyperlipidemia and/or statin therapy  Direct Measure LDL (RXX552) must be ordered separately in these patients  TRIGLYCERIDES 186 mg/dL H <=150   Specimen collection should occur prior to N-Acetylcysteine or Metamizole administration due to the potential for falsely depressed results  HDL,DIRECT 41 mg/dL  40-60   Specimen collection should occur prior to Metamizole administration due to the potential for falsely depressed results

## 2018-01-15 VITALS
RESPIRATION RATE: 16 BRPM | HEART RATE: 84 BPM | DIASTOLIC BLOOD PRESSURE: 66 MMHG | WEIGHT: 150.6 LBS | SYSTOLIC BLOOD PRESSURE: 124 MMHG | HEIGHT: 61 IN | TEMPERATURE: 97.4 F | BODY MASS INDEX: 28.43 KG/M2

## 2018-01-31 DIAGNOSIS — I10 HYPERTENSION, UNSPECIFIED TYPE: Primary | ICD-10-CM

## 2018-01-31 DIAGNOSIS — E11.65 UNCONTROLLED TYPE 2 DIABETES MELLITUS WITH COMPLICATION, UNSPECIFIED LONG TERM INSULIN USE STATUS: ICD-10-CM

## 2018-01-31 DIAGNOSIS — E11.8 UNCONTROLLED TYPE 2 DIABETES MELLITUS WITH COMPLICATION, UNSPECIFIED LONG TERM INSULIN USE STATUS: ICD-10-CM

## 2018-01-31 RX ORDER — LATANOPROST 50 UG/ML
SOLUTION/ DROPS OPHTHALMIC
COMMUNITY
Start: 2013-04-18

## 2018-01-31 RX ORDER — SIMVASTATIN 80 MG
1 TABLET ORAL DAILY
COMMUNITY
Start: 2011-03-21 | End: 2018-03-05 | Stop reason: SDUPTHER

## 2018-01-31 RX ORDER — LOSARTAN POTASSIUM 50 MG/1
1 TABLET ORAL DAILY
COMMUNITY
Start: 2011-11-07 | End: 2018-01-31 | Stop reason: SDUPTHER

## 2018-01-31 RX ORDER — ACETAMINOPHEN 500 MG
TABLET ORAL
COMMUNITY
Start: 2017-11-14

## 2018-01-31 RX ORDER — LOSARTAN POTASSIUM 50 MG/1
50 TABLET ORAL DAILY
Qty: 90 TABLET | Refills: 3 | Status: SHIPPED | OUTPATIENT
Start: 2018-01-31 | End: 2019-01-31 | Stop reason: SDUPTHER

## 2018-02-01 ENCOUNTER — TELEPHONE (OUTPATIENT)
Dept: FAMILY MEDICINE CLINIC | Facility: CLINIC | Age: 83
End: 2018-02-01

## 2018-02-01 DIAGNOSIS — E13.9 DIABETES 1.5, MANAGED AS TYPE 2 (HCC): Primary | ICD-10-CM

## 2018-02-01 NOTE — TELEPHONE ENCOUNTER
Spoke with pharmacist and they are going to correct the script  I tried to call patient to make her aware  She did not answer so I left her a message

## 2018-02-16 DIAGNOSIS — E13.9 DIABETES 1.5, MANAGED AS TYPE 2 (HCC): Primary | ICD-10-CM

## 2018-02-16 RX ORDER — INSULIN DETEMIR 100 [IU]/ML
INJECTION, SOLUTION SUBCUTANEOUS
Qty: 60 PEN | Refills: 3 | Status: SHIPPED | OUTPATIENT
Start: 2018-02-16 | End: 2018-05-15 | Stop reason: SDUPTHER

## 2018-03-05 DIAGNOSIS — E78.2 MIXED HYPERLIPIDEMIA: Primary | ICD-10-CM

## 2018-03-05 RX ORDER — SIMVASTATIN 80 MG
TABLET ORAL
Qty: 90 TABLET | Refills: 3 | Status: SHIPPED | OUTPATIENT
Start: 2018-03-05 | End: 2019-05-26 | Stop reason: SDUPTHER

## 2018-05-04 ENCOUNTER — APPOINTMENT (OUTPATIENT)
Dept: LAB | Facility: HOSPITAL | Age: 83
End: 2018-05-04
Payer: COMMERCIAL

## 2018-05-04 DIAGNOSIS — E11.65 TYPE 2 DIABETES MELLITUS WITH HYPERGLYCEMIA (HCC): ICD-10-CM

## 2018-05-04 DIAGNOSIS — E55.9 VITAMIN D DEFICIENCY: ICD-10-CM

## 2018-05-04 DIAGNOSIS — E78.5 HYPERLIPIDEMIA: ICD-10-CM

## 2018-05-04 DIAGNOSIS — M85.80 OTHER SPECIFIED DISORDERS OF BONE DENSITY AND STRUCTURE, UNSPECIFIED SITE (CODE): ICD-10-CM

## 2018-05-04 DIAGNOSIS — I10 ESSENTIAL (PRIMARY) HYPERTENSION: ICD-10-CM

## 2018-05-04 LAB
ALBUMIN SERPL BCP-MCNC: 3.4 G/DL (ref 3.5–5)
ALP SERPL-CCNC: 73 U/L (ref 46–116)
ALT SERPL W P-5'-P-CCNC: 16 U/L (ref 12–78)
ANION GAP SERPL CALCULATED.3IONS-SCNC: 6 MMOL/L (ref 4–13)
AST SERPL W P-5'-P-CCNC: 18 U/L (ref 5–45)
BASOPHILS # BLD AUTO: 0.06 THOUSANDS/ΜL (ref 0–0.1)
BASOPHILS NFR BLD AUTO: 1 % (ref 0–1)
BILIRUB SERPL-MCNC: 0.63 MG/DL (ref 0.2–1)
BUN SERPL-MCNC: 19 MG/DL (ref 5–25)
CALCIUM SERPL-MCNC: 9.6 MG/DL (ref 8.3–10.1)
CHLORIDE SERPL-SCNC: 107 MMOL/L (ref 100–108)
CHOLEST SERPL-MCNC: 146 MG/DL (ref 50–200)
CO2 SERPL-SCNC: 28 MMOL/L (ref 21–32)
CREAT SERPL-MCNC: 1.08 MG/DL (ref 0.6–1.3)
EOSINOPHIL # BLD AUTO: 0.33 THOUSAND/ΜL (ref 0–0.61)
EOSINOPHIL NFR BLD AUTO: 5 % (ref 0–6)
ERYTHROCYTE [DISTWIDTH] IN BLOOD BY AUTOMATED COUNT: 12.2 % (ref 11.6–15.1)
EST. AVERAGE GLUCOSE BLD GHB EST-MCNC: 171 MG/DL
GFR SERPL CREATININE-BSD FRML MDRD: 48 ML/MIN/1.73SQ M
GLUCOSE P FAST SERPL-MCNC: 95 MG/DL (ref 65–99)
HBA1C MFR BLD: 7.6 % (ref 4.2–6.3)
HCT VFR BLD AUTO: 44.5 % (ref 34.8–46.1)
HDLC SERPL-MCNC: 42 MG/DL (ref 40–60)
HGB BLD-MCNC: 14 G/DL (ref 11.5–15.4)
LDLC SERPL CALC-MCNC: 78 MG/DL (ref 0–100)
LYMPHOCYTES # BLD AUTO: 2.05 THOUSANDS/ΜL (ref 0.6–4.47)
LYMPHOCYTES NFR BLD AUTO: 31 % (ref 14–44)
MCH RBC QN AUTO: 30 PG (ref 26.8–34.3)
MCHC RBC AUTO-ENTMCNC: 31.5 G/DL (ref 31.4–37.4)
MCV RBC AUTO: 96 FL (ref 82–98)
MONOCYTES # BLD AUTO: 0.54 THOUSAND/ΜL (ref 0.17–1.22)
MONOCYTES NFR BLD AUTO: 8 % (ref 4–12)
NEUTROPHILS # BLD AUTO: 3.65 THOUSANDS/ΜL (ref 1.85–7.62)
NEUTS SEG NFR BLD AUTO: 55 % (ref 43–75)
NRBC BLD AUTO-RTO: 0 /100 WBCS
PLATELET # BLD AUTO: 220 THOUSANDS/UL (ref 149–390)
PMV BLD AUTO: 10.8 FL (ref 8.9–12.7)
POTASSIUM SERPL-SCNC: 4.2 MMOL/L (ref 3.5–5.3)
PROT SERPL-MCNC: 6.9 G/DL (ref 6.4–8.2)
RBC # BLD AUTO: 4.66 MILLION/UL (ref 3.81–5.12)
SODIUM SERPL-SCNC: 141 MMOL/L (ref 136–145)
TRIGL SERPL-MCNC: 131 MG/DL
WBC # BLD AUTO: 6.65 THOUSAND/UL (ref 4.31–10.16)

## 2018-05-04 PROCEDURE — 83036 HEMOGLOBIN GLYCOSYLATED A1C: CPT

## 2018-05-04 PROCEDURE — 36415 COLL VENOUS BLD VENIPUNCTURE: CPT

## 2018-05-04 PROCEDURE — 80053 COMPREHEN METABOLIC PANEL: CPT

## 2018-05-04 PROCEDURE — 85025 COMPLETE CBC W/AUTO DIFF WBC: CPT

## 2018-05-04 PROCEDURE — 80061 LIPID PANEL: CPT

## 2018-05-15 ENCOUNTER — OFFICE VISIT (OUTPATIENT)
Dept: FAMILY MEDICINE CLINIC | Facility: CLINIC | Age: 83
End: 2018-05-15
Payer: COMMERCIAL

## 2018-05-15 VITALS
TEMPERATURE: 98.3 F | DIASTOLIC BLOOD PRESSURE: 68 MMHG | WEIGHT: 153.8 LBS | HEART RATE: 64 BPM | RESPIRATION RATE: 16 BRPM | SYSTOLIC BLOOD PRESSURE: 148 MMHG | BODY MASS INDEX: 29.06 KG/M2

## 2018-05-15 DIAGNOSIS — E78.5 HYPERLIPIDEMIA, UNSPECIFIED HYPERLIPIDEMIA TYPE: ICD-10-CM

## 2018-05-15 DIAGNOSIS — I10 ESSENTIAL HYPERTENSION: ICD-10-CM

## 2018-05-15 DIAGNOSIS — IMO0002 UNCONTROLLED TYPE 2 DIABETES MELLITUS WITH COMPLICATION, WITH LONG-TERM CURRENT USE OF INSULIN: Primary | ICD-10-CM

## 2018-05-15 DIAGNOSIS — E55.9 VITAMIN D DEFICIENCY: ICD-10-CM

## 2018-05-15 PROCEDURE — 99214 OFFICE O/P EST MOD 30 MIN: CPT | Performed by: FAMILY MEDICINE

## 2018-05-15 PROCEDURE — 1101F PT FALLS ASSESS-DOCD LE1/YR: CPT | Performed by: FAMILY MEDICINE

## 2018-05-15 PROCEDURE — 3725F SCREEN DEPRESSION PERFORMED: CPT | Performed by: FAMILY MEDICINE

## 2018-05-15 NOTE — PROGRESS NOTES
Chief Complaint   Patient presents with    Follow-up     6 month follow up for Diabetes mellitus type 2, uncontrolled, Hyperlipidemia, Hypertension, Osteopenia, and Vitamin D deficiency  Health Maintenance   Topic Date Due    COLONOSCOPY  1935    DTaP,Tdap,and Td Vaccines (2 - Td) 01/01/2010    Urinary Incontinence Screening  04/29/2016    Diabetic Foot Exam  09/22/2017    GLAUCOMA SCREENING 67+ YR  10/28/2017    OPHTHALMOLOGY EXAM  10/28/2017    Annual Wellnes Visit (AWV)  11/10/2017    INFLUENZA VACCINE  09/01/2018    TSH LEVEL  10/30/2018    URINE MICROALBUMIN  10/30/2018    HEMOGLOBIN A1C  11/04/2018    Fall Risk  11/14/2018    Depression Screening PHQ-9  11/14/2018    DXA SCAN  05/16/2020    PNEUMOCOCCAL POLYSACCHARIDE VACCINE AGE 65 AND OVER  Completed     Assessment/Plan:    Reviewed lab in 5/2018  HgA1C 7 6 stable  Lipid 146/137/42/78 ok  CMP ok  CBC normal    DM---stable, continue levemir and novolog  HTN---good control  Continue losartan  Hyperlipidemia---good control  Continue simvastatin  VitD deficiency---continue vitD  FU specialist as scheduled  Got prevnar 4/2015  Got pneumovax 2016  Got zostavax in 10/2014  Dexa 5/2017, osteopenia, stable  Pt is on calcium/vitD and exercise regularly  Fall precautions  RTO in 6 months with labs  Diagnoses and all orders for this visit:    Uncontrolled type 2 diabetes mellitus with complication, with long-term current use of insulin ([facility] [address] )  -     HEMOGLOBIN A1C W/ EAG ESTIMATION; Future  -     Microalbumin / creatinine urine ratio; Future  -     insulin detemir (LEVEMIR FLEXTOUCH) subcutaneous injection pen 100 units/mL; Use 37u in the morning and 34u in the evening or as instructed    Essential hypertension  -     Comprehensive metabolic panel; Future  -     TSH, 3rd generation; Future    Hyperlipidemia, unspecified hyperlipidemia type  -     Lipid panel;  Future    Vitamin D deficiency  -     Vitamin D 25 hydroxy; Future    Other orders  -     aspirin 81 MG tablet; Take 81 mg by mouth daily  -     Discontinue: insulin detemir (LEVEMIR FLEXTOUCH) subcutaneous injection pen 100 units/mL; Use 37u in the morning and 34u at bedtime or as instructed          Subjective:      Patient ID: Sandra Mcgowan is a 81 y o  female  HPI  Pt is here by herself  DM---5/2018 HgA1C 7 6 stable from last time  Pt states she is on levemir 37u am and 34u pm  She use novolog ISS, she does not use 20u before meals because hypoglycemia episode  FU opthalmology in [place]  Had Left cataract surgery in 5/2014  Right eye cannot see  Last exam was 3/15/2018  FU retinal specialist also per pt  FU podiatry Dr Juan Guardado Q 3-4 months  Refused to check foot today  HTN----She is on losartan 50mg QD  Denies SOB, CP, headache etc    Does not check BP at home  Hyperlipidemia----she is on simvastatin 80mg qhs  Denies side effects  VitD deficiency---on vitD 5000IU daily  Pt lives by herself  Does all ADL's  Still driving  Had children and great-grandson living close to her  Denies recent falls  Denies depression  The following portions of the patient's history were reviewed and updated as appropriate: allergies, current medications, past family history, past medical history, past social history, past surgical history and problem list     Review of Systems   Constitutional: Negative for appetite change, chills and fever  HENT: Negative for congestion, ear pain, sinus pain and sore throat  Eyes: Negative for discharge and itching  Respiratory: Negative for apnea, cough, chest tightness, shortness of breath and wheezing  Cardiovascular: Negative for chest pain, palpitations and leg swelling  Gastrointestinal: Negative for abdominal pain, anal bleeding, constipation, diarrhea, nausea and vomiting  Endocrine: Negative for cold intolerance, heat intolerance and polyuria     Genitourinary: Negative for difficulty urinating and dysuria  Musculoskeletal: Negative for arthralgias, back pain and myalgias  Skin: Negative for rash  Neurological: Negative for dizziness and headaches  Psychiatric/Behavioral: Negative for agitation  Objective:      /68 (BP Location: Left arm, Patient Position: Sitting, Cuff Size: Standard)   Pulse 64   Temp 98 3 °F (36 8 °C) (Tympanic)   Resp 16   Wt 69 8 kg (153 lb 12 8 oz)   BMI 29 06 kg/m²          Physical Exam   Constitutional: She appears well-developed  No distress  Eyes: Conjunctivae are normal  Pupils are equal, round, and reactive to light  Right eye exhibits no discharge  Left eye exhibits no discharge  Neck: Normal range of motion  No thyromegaly present  Cardiovascular: Normal rate, regular rhythm and normal heart sounds  Exam reveals no gallop and no friction rub  No murmur heard  Pulmonary/Chest: Effort normal and breath sounds normal  No respiratory distress  She has no wheezes  She has no rales  She exhibits no tenderness  Abdominal: Soft  Bowel sounds are normal    Musculoskeletal: Normal range of motion  Lymphadenopathy:     She has no cervical adenopathy  Neurological: She is alert  Psychiatric: She has a normal mood and affect

## 2018-06-27 DIAGNOSIS — E11.65 TYPE 2 DIABETES MELLITUS WITH HYPERGLYCEMIA, WITH LONG-TERM CURRENT USE OF INSULIN (HCC): Primary | ICD-10-CM

## 2018-06-27 DIAGNOSIS — Z79.4 TYPE 2 DIABETES MELLITUS WITH HYPERGLYCEMIA, WITH LONG-TERM CURRENT USE OF INSULIN (HCC): Primary | ICD-10-CM

## 2018-06-27 RX ORDER — BLOOD SUGAR DIAGNOSTIC
STRIP MISCELLANEOUS
Qty: 300 EACH | Refills: 3 | Status: SHIPPED | OUTPATIENT
Start: 2018-06-27 | End: 2019-05-26 | Stop reason: SDUPTHER

## 2018-11-15 ENCOUNTER — APPOINTMENT (OUTPATIENT)
Dept: LAB | Facility: HOSPITAL | Age: 83
End: 2018-11-15
Payer: COMMERCIAL

## 2018-11-15 DIAGNOSIS — I10 ESSENTIAL HYPERTENSION: ICD-10-CM

## 2018-11-15 DIAGNOSIS — IMO0002 UNCONTROLLED TYPE 2 DIABETES MELLITUS WITH COMPLICATION, WITH LONG-TERM CURRENT USE OF INSULIN: ICD-10-CM

## 2018-11-15 DIAGNOSIS — E78.5 HYPERLIPIDEMIA, UNSPECIFIED HYPERLIPIDEMIA TYPE: ICD-10-CM

## 2018-11-15 DIAGNOSIS — E55.9 VITAMIN D DEFICIENCY: ICD-10-CM

## 2018-11-15 LAB
25(OH)D3 SERPL-MCNC: 63 NG/ML (ref 30–100)
ALBUMIN SERPL BCP-MCNC: 3.6 G/DL (ref 3.5–5)
ALP SERPL-CCNC: 76 U/L (ref 46–116)
ALT SERPL W P-5'-P-CCNC: 20 U/L (ref 12–78)
ANION GAP SERPL CALCULATED.3IONS-SCNC: 4 MMOL/L (ref 4–13)
AST SERPL W P-5'-P-CCNC: 18 U/L (ref 5–45)
BILIRUB SERPL-MCNC: 0.57 MG/DL (ref 0.2–1)
BUN SERPL-MCNC: 16 MG/DL (ref 5–25)
CALCIUM SERPL-MCNC: 9.4 MG/DL (ref 8.3–10.1)
CHLORIDE SERPL-SCNC: 104 MMOL/L (ref 100–108)
CHOLEST SERPL-MCNC: 138 MG/DL (ref 50–200)
CO2 SERPL-SCNC: 29 MMOL/L (ref 21–32)
CREAT SERPL-MCNC: 1.14 MG/DL (ref 0.6–1.3)
CREAT UR-MCNC: 162 MG/DL
EST. AVERAGE GLUCOSE BLD GHB EST-MCNC: 180 MG/DL
GFR SERPL CREATININE-BSD FRML MDRD: 45 ML/MIN/1.73SQ M
GLUCOSE P FAST SERPL-MCNC: 104 MG/DL (ref 65–99)
HBA1C MFR BLD: 7.9 % (ref 4.2–6.3)
HDLC SERPL-MCNC: 43 MG/DL (ref 40–60)
LDLC SERPL CALC-MCNC: 64 MG/DL (ref 0–100)
MICROALBUMIN UR-MCNC: 10.9 MG/L (ref 0–20)
MICROALBUMIN/CREAT 24H UR: 7 MG/G CREATININE (ref 0–30)
NONHDLC SERPL-MCNC: 95 MG/DL
POTASSIUM SERPL-SCNC: 4.5 MMOL/L (ref 3.5–5.3)
PROT SERPL-MCNC: 7.1 G/DL (ref 6.4–8.2)
SODIUM SERPL-SCNC: 137 MMOL/L (ref 136–145)
TRIGL SERPL-MCNC: 153 MG/DL
TSH SERPL DL<=0.05 MIU/L-ACNC: 2.91 UIU/ML (ref 0.36–3.74)

## 2018-11-15 PROCEDURE — 36415 COLL VENOUS BLD VENIPUNCTURE: CPT

## 2018-11-15 PROCEDURE — 83036 HEMOGLOBIN GLYCOSYLATED A1C: CPT

## 2018-11-15 PROCEDURE — 82306 VITAMIN D 25 HYDROXY: CPT

## 2018-11-15 PROCEDURE — 80053 COMPREHEN METABOLIC PANEL: CPT

## 2018-11-15 PROCEDURE — 82570 ASSAY OF URINE CREATININE: CPT

## 2018-11-15 PROCEDURE — 82043 UR ALBUMIN QUANTITATIVE: CPT

## 2018-11-15 PROCEDURE — 80061 LIPID PANEL: CPT

## 2018-11-15 PROCEDURE — 84443 ASSAY THYROID STIM HORMONE: CPT

## 2018-11-20 ENCOUNTER — OFFICE VISIT (OUTPATIENT)
Dept: FAMILY MEDICINE CLINIC | Facility: CLINIC | Age: 83
End: 2018-11-20
Payer: COMMERCIAL

## 2018-11-20 VITALS
HEART RATE: 84 BPM | BODY MASS INDEX: 28.98 KG/M2 | DIASTOLIC BLOOD PRESSURE: 78 MMHG | SYSTOLIC BLOOD PRESSURE: 140 MMHG | WEIGHT: 147.6 LBS | RESPIRATION RATE: 16 BRPM | HEIGHT: 60 IN | TEMPERATURE: 97.7 F

## 2018-11-20 DIAGNOSIS — E78.5 HYPERLIPIDEMIA, UNSPECIFIED HYPERLIPIDEMIA TYPE: ICD-10-CM

## 2018-11-20 DIAGNOSIS — Z00.00 MEDICARE ANNUAL WELLNESS VISIT, SUBSEQUENT: ICD-10-CM

## 2018-11-20 DIAGNOSIS — I10 ESSENTIAL HYPERTENSION: ICD-10-CM

## 2018-11-20 DIAGNOSIS — IMO0002 UNCONTROLLED TYPE 2 DIABETES MELLITUS WITH COMPLICATION, WITH LONG-TERM CURRENT USE OF INSULIN: Primary | ICD-10-CM

## 2018-11-20 PROCEDURE — 1125F AMNT PAIN NOTED PAIN PRSNT: CPT | Performed by: FAMILY MEDICINE

## 2018-11-20 PROCEDURE — 1036F TOBACCO NON-USER: CPT | Performed by: FAMILY MEDICINE

## 2018-11-20 PROCEDURE — 99214 OFFICE O/P EST MOD 30 MIN: CPT | Performed by: FAMILY MEDICINE

## 2018-11-20 PROCEDURE — 1170F FXNL STATUS ASSESSED: CPT | Performed by: FAMILY MEDICINE

## 2018-11-20 PROCEDURE — G0439 PPPS, SUBSEQ VISIT: HCPCS | Performed by: FAMILY MEDICINE

## 2018-11-20 PROCEDURE — 4040F PNEUMOC VAC/ADMIN/RCVD: CPT | Performed by: FAMILY MEDICINE

## 2018-11-20 PROCEDURE — 3008F BODY MASS INDEX DOCD: CPT | Performed by: FAMILY MEDICINE

## 2018-11-20 PROCEDURE — 1160F RVW MEDS BY RX/DR IN RCRD: CPT | Performed by: FAMILY MEDICINE

## 2018-11-20 NOTE — PROGRESS NOTES
Chief Complaint   Patient presents with   [employer] Wellness Visit     Annual wellness visit   Follow-up     6 month follow up  Health Maintenance   Topic Date Due    CRC Screening: Colonoscopy  1935    DTaP,Tdap,and Td Vaccines (2 - Td) 01/01/2010    Urinary Incontinence Screening  04/29/2016    Medicare Annual Wellness Visit (AWV)  11/10/2017    INFLUENZA VACCINE  07/01/2018    DM Eye Exam  10/31/2018    Diabetic Foot Exam  04/19/2019    Fall Risk  05/15/2019    Depression Screening PHQ  05/15/2019    HEMOGLOBIN A1C  05/15/2019    URINE MICROALBUMIN  11/15/2019    DXA SCAN  05/16/2020    Pneumococcal PPSV23/PCV13 65+ Years / Low and Medium Risk  Completed     Assessment/Plan:  Reviewed lab in 11/2018  TSH normal  CMP ok  hgA1C 7 9 uncontrolled  Lipid 138/153/43/64 ok  M/c 7 ok  VitD 63 good    DM---uncontrolled  Continue levemir  Advised pt to use novolog 5u with breakfast, 10u with lunch and 10u with meals  Check blood sugar 2 hours after meals and use ISS  Drop off blood sugar log to office next week  Educated pt about hypoglycemia and treatment  Pt understood  HTN---good control  Continue losartan  Hyperlipidemia---good control  Continue simvastatin  VitD deficiency---continue vitD       Got flu shot this season per pt  Got prevnar 4/2015  Got pneumovax 2016  Got zostavax in 10/2014  Will check insurance for coverage of Tdap and shingrix  Dexa 5/2017, osteopenia, stable  Pt is on calcium/vitD and exercise regularly  Fall precautions  RTO in 6 months with labs  POA---daughter  living will---Will bring copy next time          Diagnoses and all orders for this visit:    Uncontrolled type 2 diabetes mellitus with complication, with long-term current use of insulin (HCC)  -     insulin detemir (LEVEMIR FLEXTOUCH) 100 Units/mL injection pen; Use 37u in the morning and 34u in the evening or as instructed  -     Comprehensive metabolic panel;  Future  -     Hemoglobin A1C; Future  -     insulin aspart (NovoLOG) 100 Units/mL injection pen; Use 10u with meals and insuling sliding scale as needed    Essential hypertension  -     Comprehensive metabolic panel; Future  -     CBC; Future    Hyperlipidemia, unspecified hyperlipidemia type  -     Lipid panel; Future    Medicare annual wellness visit, subsequent    Other orders  -     Cancel: Ambulatory referral to Gastroenterology; Future  -     Discontinue: insulin aspart (NovoLOG) 100 Units/mL injection pen; Use 10u with meals and insuling sliding scale as needed          Subjective:      Patient ID: Micky Santamaria is a 81 y o  female  HPI    Pt is here by herself  DM---11/2018 HgA1C 7 9 worse  Pt states she is on levemir 37u am and 34u pm  Fasting BS less than 150  She use novolog ISS before she eat  Usually 5-10u before lunch and dinner  She does not check blood sugar 2 hours after meals  She states usually her dinner is her biggest meal    [facility] opthalmology in [facility]  Had Left cataract surgery in 5/2014  Right eye cannot see  Last exam was 3/15/2018  FU retinal specialist also per pt  FU podiatry Dr Pat Capps Q 3-4 months  Last exam was last week  Refused to check foot today       HTN----She is on losartan 50mg QD  Denies SOB, CP, headache etc    Does not check BP at home  Today /78 good       Hyperlipidemia----she is on simvastatin 80mg qhs  Denies side effects       VitD deficiency---on vitD 5000IU daily       Pt lives by herself  Does all ADL's  Still driving  Had children and great-grandson living close to her  Denies recent falls  Denies depression  The following portions of the patient's history were reviewed and updated as appropriate: allergies, current medications, past family history, past medical history, past social history, past surgical history and problem list     Review of Systems   Constitutional: Negative for appetite change, chills and fever     HENT: Negative for congestion, ear pain, sinus pain and sore throat  Eyes: Negative for discharge and itching  Respiratory: Negative for apnea, cough, chest tightness, shortness of breath and wheezing  Cardiovascular: Negative for chest pain, palpitations and leg swelling  Gastrointestinal: Negative for abdominal pain, anal bleeding, constipation, diarrhea, nausea and vomiting  Endocrine: Negative for cold intolerance, heat intolerance and polyuria  Genitourinary: Negative for difficulty urinating and dysuria  Musculoskeletal: Negative for arthralgias, back pain and myalgias  Skin: Negative for rash  Neurological: Negative for dizziness and headaches  Psychiatric/Behavioral: Negative for agitation  Objective:      /78 (BP Location: Left arm, Patient Position: Sitting, Cuff Size: Standard)   Pulse 84   Temp 97 7 °F (36 5 °C) (Tympanic)   Resp 16   Ht 4' 11 75" (1 518 m)   Wt 67 kg (147 lb 9 6 oz)   BMI 29 07 kg/m²          Physical Exam   Constitutional: She appears well-developed  No distress  HENT:   Head: Normocephalic  Right Ear: External ear normal    Left Ear: External ear normal    Nose: Nose normal    Mouth/Throat: Oropharynx is clear and moist    Neck: Normal range of motion  No thyromegaly present  Cardiovascular: Normal rate, regular rhythm and normal heart sounds  Exam reveals no gallop and no friction rub  No murmur heard  Pulmonary/Chest: Effort normal and breath sounds normal  No respiratory distress  She has no wheezes  She has no rales  She exhibits no tenderness  Abdominal: Soft  Bowel sounds are normal    Musculoskeletal: Normal range of motion  Lymphadenopathy:     She has no cervical adenopathy  Neurological: She is alert  Psychiatric: She has a normal mood and affect

## 2018-11-20 NOTE — PROGRESS NOTES
Chief Complaint   Patient presents with   [facility] Wellness Visit     Annual wellness visit   Follow-up     6 month follow up  Assessment and Plan:    Problem List Items Addressed This Visit     None      Visit Diagnoses     Screening for colon cancer    -  Primary        Health Maintenance Due   Topic Date Due    CRC Screening: Colonoscopy  1935    DTaP,Tdap,and Td Vaccines (2 - Td) 01/01/2010    Urinary Incontinence Screening  04/29/2016    Medicare Annual Wellness Visit (AWV)  11/10/2017    INFLUENZA VACCINE  07/01/2018    DM Eye Exam  10/31/2018         HPI:  Jami Lozada is a 81 y o  female here for her Subsequent Wellness Visit      Patient Active Problem List   Diagnosis    Allergic rhinitis    Alopecia    Carpal tunnel syndrome    Diabetes mellitus type 2, uncontrolled ([address] )    Esophageal reflux    Gallstone    Glaucoma    Hyperlipidemia    Hypertension    Osteoarthritis    Osteopenia    Spinal stenosis    Vitamin D deficiency     Past Medical History:   Diagnosis Date    Carpal tunnel syndrome     Choledocholithiasis     resolved 3/6/2015    Diverticulosis     Inguinal hernia     Sciatica      Past Surgical History:   Procedure Laterality Date    ESOPHAGOGASTRODUODENOSCOPY      resolved 1/2002    HEMORRHOID SURGERY      resolved 2/1999    NEUROPLASTY / TRANSPOSITION MEDIAN NERVE AT CARPAL TUNNEL Right     last assessed 8/10/2012    RECTAL SURGERY       Family History   Problem Relation Age of Onset    Heart attack Mother         MI    Coronary artery disease Mother     Diabetes Mother         DM    Hypertension Mother     Stroke Mother         syndrome    Stroke Father         syndrome     History   Smoking Status    Never Smoker   Smokeless Tobacco    Never Used     History   Alcohol Use No      History   Drug Use No       Current Outpatient Prescriptions   Medication Sig Dispense Refill    aspirin 81 MG tablet Take 81 mg by mouth daily      brimonidine (ALPHAGAN P) 0 1 % Apply to eye      Calcium Carbonate-Vit D-Min (CALCIUM 1200) 5188-7291 MG-UNIT CHEW Chew      Cholecalciferol (VITAMIN D3) 5000 UNIT/ML LIQD Take by mouth      insulin aspart (NovoLOG) 100 Units/mL SOPN 20u with meals and insuling sliding scale as needed 5 pen 5    insulin detemir (LEVEMIR FLEXTOUCH) subcutaneous injection pen 100 units/mL Use 37u in the morning and 34u in the evening or as instructed 25 pen 3    Insulin Pen Needle (B-D ULTRAFINE III SHORT PEN) 31G X 8 MM MISC by Does not apply route 4 (four) times a day for 30 days 200 each 11    latanoprost (XALATAN) 0 005 % ophthalmic solution Apply to eye      losartan (COZAAR) 50 mg tablet Take 1 tablet (50 mg total) by mouth daily for 90 days 90 tablet 3    ONETOUCH VERIO test strip TEST 3 TIMES DAILY  300 each 3    simvastatin (ZOCOR) 80 mg tablet TAKE 1 TABLET BY MOUTH EVERY DAY 90 tablet 3     No current facility-administered medications for this visit  Allergies   Allergen Reactions    Ace Inhibitors Cough    Metformin Diarrhea     Immunization History   Administered Date(s) Administered    Influenza 12/03/2004    Influenza Split High Dose Preservative Free IM 12/29/2014    Influenza TIV (IM) 10/06/2010, 11/08/2012    Pneumococcal Conjugate 13-Valent 04/29/2015    Pneumococcal Polysaccharide PPV23 11/01/2002, 11/10/2016    Tdap 01/01/2000       Patient Care Team:  Toan Norton MD as PCP - General    Medicare Screening Tests and Risk Assessments:  Oscar Phillips is here for her Subsequent Wellness visit  Last Medicare Wellness visit information reviewed, patient interviewed, no change since last AWV  Health Risk Assessment:  Patient rates overall health as very good  Patient feels that their physical health rating is Same  Eyesight was rated as Same  Hearing was rated as Same  Patient feels that their emotional and mental health rating is Same  Pain experienced by patient in the last 7 days has been None  Patient's pain rating has been 1/10  Emotional/Mental Health:  Patient has been feeling nervous/anxious  PHQ-9 Depression Screening:    Frequency of the following problems over the past two weeks:      1  Little interest or pleasure in doing things: 0 - not at all      2  Feeling down, depressed, or hopeless: 0 - not at all  PHQ-2 Score: 0          Broken Bones/Falls: Fall Risk Assessment:    In the past year, patient has experienced: No history of falling in past year          Bladder/Bowel:  Patient has not leaked urine accidently in the last six months  Patient reports no loss of bowel control  Immunizations:  Patient has had a flu vaccination within the last year  Patient has received a pneumonia shot  Patient has received a shingles shot  Patient has not received tetanus/diphtheria shot  Home Safety:  Patient does not have trouble with stairs inside or outside of their home  Patient currently reports that there are no safety hazards present in home, working smoke alarms, no working carbon monoxide detectors  Preventative Screenings:   No breast cancer screening performed, no cholesterol screen completed,     Nutrition:  Current diet: Diabetic and No Added Salt with servings of the following:    Medications:  Patient is currently taking over-the-counter supplements  Patient is able to manage medications  Lifestyle Choices:  Patient reports no tobacco use  Patient has not smoked or used tobacco in the past   Patient reports no alcohol use  Patient drives a vehicle  Patient wears seat belt  Current level of exercise of physical activity described by patient as: 1-2 hours 3 times a week at local [employer])          Activities of Daily Living:  Can get out of bed by his or her self, able to dress self, able to make own meals, able to do own shopping, able to bathe self, can do own laundry/housekeeping, can manage own money, pay bills and track expenses    Previous Hospitalizations:  No hospitalization or ED visit in past 12 months        Advanced Directives:  Patient has decided on a power of   Patient has spoken to designated power of   Patient has completed advanced directive  Preventative Screening/Counseling:      Cardiovascular:      General: Risks and Benefits Discussed and Screening Current          Diabetes:      General: Risks and Benefits Discussed and Screening Current          Colorectal Cancer:      General: Risks and Benefits Discussed and Screening Not Indicated          Breast Cancer:      General: Risks and Benefits Discussed and Screening Not Indicated          Osteoporosis:      General: Risks and Benefits Discussed and Patient Declines          Advanced Directives:   Patient has living will for healthcare, patient has an advanced directive  End of life assessment reviewed with patient  Provider agrees with end of life decisions        Immunizations:      Influenza: Risks & Benefits Discussed and Influenza UTD This Year      Pneumococcal: Risks & Benefits Discussed and Lifetime Vaccine Completed      Shingrix: Shingrix Vaccine Needed Today and Risks & Benefits Discussed      TDAP: Tdap Vaccine Needed Today and Risks & Benefits Discussed

## 2018-12-01 ENCOUNTER — TELEPHONE (OUTPATIENT)
Dept: FAMILY MEDICINE CLINIC | Facility: CLINIC | Age: 83
End: 2018-12-01

## 2018-12-01 NOTE — TELEPHONE ENCOUNTER
Got pt blood sugar log 11/22-11/29/2018  Fasting sugar , usually , good  Continue levemir  Pt usually gave her novolog after 2 hours of meal according to her ISS  5-8 u 2 hours after breakfast  6-14u 2 hours after lunch  10-18u 2 hours after dinner  I advised pt to give novology 5u with breakfast, 10u with lunch and 10u with dinner  Use ISS low schedule to cover 2 hours after meal  Pt understood  Will send log in 2 weeks  Use ISS Low schedule   1  -199: 1 unit bolus Insulin (regular or rapid-acting)  2  -249: 2 units bolus Insulin  3  -299: 3 units bolus Insulin  4  -349: 4 units bolus Insulin  5   BG Over 350: 5 units bolus Insulin

## 2018-12-21 ENCOUNTER — TELEPHONE (OUTPATIENT)
Dept: FAMILY MEDICINE CLINIC | Facility: CLINIC | Age: 83
End: 2018-12-21

## 2018-12-21 DIAGNOSIS — E11.65 UNCONTROLLED TYPE 2 DIABETES MELLITUS WITH HYPERGLYCEMIA (HCC): Primary | ICD-10-CM

## 2018-12-21 NOTE — TELEPHONE ENCOUNTER
I got blood sugar log from 12/10-12/17/2018  Fasting sugar  good  Pt uses 5u novolog before breakfast, 2 hours after blood sugar 143-249, pt usually use 2u to cover  Pt uses 10u novolog before lunch, 2 hours after blood sugar , pt occasionally use 1-2 u to cover  Pt uses 10u novolog before dinner, 2 hours after blood sugar 113-220, pt occasionally uses 1-2 u to cover  I advised pt to increase novolog to 7u with breakfast    Continue 10u with lunch and dinner  Continue levemir 37u am and 34u qhs  Pt agreed

## 2019-01-10 DIAGNOSIS — IMO0002 UNCONTROLLED TYPE 2 DIABETES MELLITUS WITH COMPLICATION, WITH LONG-TERM CURRENT USE OF INSULIN: ICD-10-CM

## 2019-01-10 NOTE — TELEPHONE ENCOUNTER
Patient wants a 90 day supply Levemir Flextouch 100 units to [address]  She takes 7 units in morning 10 units at noon and 10 units at supper and check 2 hours later to see if she needs more insulin  Can be reached [phone] any questions

## 2019-01-19 ENCOUNTER — TELEPHONE (OUTPATIENT)
Dept: FAMILY MEDICINE CLINIC | Facility: CLINIC | Age: 84
End: 2019-01-19

## 2019-01-19 NOTE — TELEPHONE ENCOUNTER
Got blood sugar log from 1/6-1/14/2019  Fasting blood sugar   Will continue levemir 37u am and 34u pm    2h after Breakfast sugar 132-200, pt got 7u novolog with breakfast and 1-2 u novolog 2hours after breakfast    I advised pt to use 9u with breakfast in the future  Keep same 10u with lunch and dinner  Pt understood

## 2019-01-31 DIAGNOSIS — I10 HYPERTENSION, UNSPECIFIED TYPE: ICD-10-CM

## 2019-01-31 RX ORDER — LOSARTAN POTASSIUM 50 MG/1
50 TABLET ORAL DAILY
Qty: 90 TABLET | Refills: 3 | Status: SHIPPED | OUTPATIENT
Start: 2019-01-31 | End: 2020-03-10

## 2019-04-24 ENCOUNTER — OFFICE VISIT (OUTPATIENT)
Dept: FAMILY MEDICINE CLINIC | Facility: CLINIC | Age: 84
End: 2019-04-24
Payer: COMMERCIAL

## 2019-04-24 VITALS
OXYGEN SATURATION: 97 % | SYSTOLIC BLOOD PRESSURE: 136 MMHG | HEIGHT: 60 IN | BODY MASS INDEX: 30.23 KG/M2 | WEIGHT: 154 LBS | HEART RATE: 92 BPM | DIASTOLIC BLOOD PRESSURE: 52 MMHG | RESPIRATION RATE: 16 BRPM | TEMPERATURE: 98.5 F

## 2019-04-24 DIAGNOSIS — E11.65 UNCONTROLLED TYPE 2 DIABETES MELLITUS WITH HYPERGLYCEMIA (HCC): ICD-10-CM

## 2019-04-24 DIAGNOSIS — J40 BRONCHITIS: Primary | ICD-10-CM

## 2019-04-24 DIAGNOSIS — I10 ESSENTIAL HYPERTENSION: ICD-10-CM

## 2019-04-24 DIAGNOSIS — E78.5 HYPERLIPIDEMIA, UNSPECIFIED HYPERLIPIDEMIA TYPE: ICD-10-CM

## 2019-04-24 PROCEDURE — 99213 OFFICE O/P EST LOW 20 MIN: CPT | Performed by: FAMILY MEDICINE

## 2019-04-24 PROCEDURE — 3078F DIAST BP <80 MM HG: CPT | Performed by: FAMILY MEDICINE

## 2019-04-24 PROCEDURE — 3075F SYST BP GE 130 - 139MM HG: CPT | Performed by: FAMILY MEDICINE

## 2019-04-24 PROCEDURE — 1160F RVW MEDS BY RX/DR IN RCRD: CPT | Performed by: FAMILY MEDICINE

## 2019-04-24 RX ORDER — AZITHROMYCIN 250 MG/1
TABLET, FILM COATED ORAL
Qty: 6 TABLET | Refills: 0 | Status: SHIPPED | OUTPATIENT
Start: 2019-04-24 | End: 2019-04-28

## 2019-04-24 RX ORDER — BENZONATATE 200 MG/1
200 CAPSULE ORAL 3 TIMES DAILY PRN
Qty: 20 CAPSULE | Refills: 0 | Status: SHIPPED | OUTPATIENT
Start: 2019-04-24 | End: 2019-05-21 | Stop reason: ALTCHOICE

## 2019-05-15 ENCOUNTER — APPOINTMENT (OUTPATIENT)
Dept: LAB | Facility: HOSPITAL | Age: 84
End: 2019-05-15
Payer: COMMERCIAL

## 2019-05-15 DIAGNOSIS — E78.5 HYPERLIPIDEMIA, UNSPECIFIED HYPERLIPIDEMIA TYPE: ICD-10-CM

## 2019-05-15 DIAGNOSIS — I10 ESSENTIAL HYPERTENSION: ICD-10-CM

## 2019-05-15 DIAGNOSIS — E11.65 UNCONTROLLED TYPE 2 DIABETES MELLITUS WITH HYPERGLYCEMIA (HCC): ICD-10-CM

## 2019-05-15 LAB
ALBUMIN SERPL BCP-MCNC: 3.5 G/DL (ref 3.5–5)
ALP SERPL-CCNC: 70 U/L (ref 46–116)
ALT SERPL W P-5'-P-CCNC: 20 U/L (ref 12–78)
ANION GAP SERPL CALCULATED.3IONS-SCNC: 4 MMOL/L (ref 4–13)
AST SERPL W P-5'-P-CCNC: 24 U/L (ref 5–45)
BASOPHILS # BLD AUTO: 0.11 THOUSANDS/ΜL (ref 0–0.1)
BASOPHILS NFR BLD AUTO: 1 % (ref 0–1)
BILIRUB SERPL-MCNC: 0.61 MG/DL (ref 0.2–1)
BUN SERPL-MCNC: 18 MG/DL (ref 5–25)
CALCIUM SERPL-MCNC: 8.9 MG/DL (ref 8.3–10.1)
CHLORIDE SERPL-SCNC: 107 MMOL/L (ref 100–108)
CHOLEST SERPL-MCNC: 143 MG/DL (ref 50–200)
CO2 SERPL-SCNC: 28 MMOL/L (ref 21–32)
CREAT SERPL-MCNC: 1.17 MG/DL (ref 0.6–1.3)
EOSINOPHIL # BLD AUTO: 0.26 THOUSAND/ΜL (ref 0–0.61)
EOSINOPHIL NFR BLD AUTO: 3 % (ref 0–6)
ERYTHROCYTE [DISTWIDTH] IN BLOOD BY AUTOMATED COUNT: 12.1 % (ref 11.6–15.1)
EST. AVERAGE GLUCOSE BLD GHB EST-MCNC: 146 MG/DL
GFR SERPL CREATININE-BSD FRML MDRD: 43 ML/MIN/1.73SQ M
GLUCOSE P FAST SERPL-MCNC: 98 MG/DL (ref 65–99)
HBA1C MFR BLD: 6.7 % (ref 4.2–6.3)
HCT VFR BLD AUTO: 44 % (ref 34.8–46.1)
HDLC SERPL-MCNC: 49 MG/DL (ref 40–60)
HGB BLD-MCNC: 14.1 G/DL (ref 11.5–15.4)
IMM GRANULOCYTES # BLD AUTO: 0.02 THOUSAND/UL (ref 0–0.2)
IMM GRANULOCYTES NFR BLD AUTO: 0 % (ref 0–2)
LDLC SERPL CALC-MCNC: 63 MG/DL (ref 0–100)
LYMPHOCYTES # BLD AUTO: 1.73 THOUSANDS/ΜL (ref 0.6–4.47)
LYMPHOCYTES NFR BLD AUTO: 21 % (ref 14–44)
MCH RBC QN AUTO: 29.7 PG (ref 26.8–34.3)
MCHC RBC AUTO-ENTMCNC: 32 G/DL (ref 31.4–37.4)
MCV RBC AUTO: 93 FL (ref 82–98)
MONOCYTES # BLD AUTO: 0.66 THOUSAND/ΜL (ref 0.17–1.22)
MONOCYTES NFR BLD AUTO: 8 % (ref 4–12)
NEUTROPHILS # BLD AUTO: 5.51 THOUSANDS/ΜL (ref 1.85–7.62)
NEUTS SEG NFR BLD AUTO: 67 % (ref 43–75)
NRBC BLD AUTO-RTO: 0 /100 WBCS
PLATELET # BLD AUTO: 209 THOUSANDS/UL (ref 149–390)
PMV BLD AUTO: 10.5 FL (ref 8.9–12.7)
POTASSIUM SERPL-SCNC: 4.3 MMOL/L (ref 3.5–5.3)
PROT SERPL-MCNC: 7.1 G/DL (ref 6.4–8.2)
RBC # BLD AUTO: 4.75 MILLION/UL (ref 3.81–5.12)
SODIUM SERPL-SCNC: 139 MMOL/L (ref 136–145)
TRIGL SERPL-MCNC: 153 MG/DL
WBC # BLD AUTO: 8.29 THOUSAND/UL (ref 4.31–10.16)

## 2019-05-15 PROCEDURE — 83036 HEMOGLOBIN GLYCOSYLATED A1C: CPT

## 2019-05-15 PROCEDURE — 36415 COLL VENOUS BLD VENIPUNCTURE: CPT

## 2019-05-15 PROCEDURE — 85025 COMPLETE CBC W/AUTO DIFF WBC: CPT

## 2019-05-15 PROCEDURE — 80053 COMPREHEN METABOLIC PANEL: CPT

## 2019-05-15 PROCEDURE — 80061 LIPID PANEL: CPT

## 2019-05-21 ENCOUNTER — OFFICE VISIT (OUTPATIENT)
Dept: FAMILY MEDICINE CLINIC | Facility: CLINIC | Age: 84
End: 2019-05-21
Payer: COMMERCIAL

## 2019-05-21 VITALS
HEART RATE: 76 BPM | RESPIRATION RATE: 16 BRPM | DIASTOLIC BLOOD PRESSURE: 66 MMHG | BODY MASS INDEX: 30.23 KG/M2 | SYSTOLIC BLOOD PRESSURE: 116 MMHG | TEMPERATURE: 97.8 F | WEIGHT: 154 LBS | HEIGHT: 60 IN

## 2019-05-21 DIAGNOSIS — E78.5 HYPERLIPIDEMIA, UNSPECIFIED HYPERLIPIDEMIA TYPE: ICD-10-CM

## 2019-05-21 DIAGNOSIS — Z13.820 SCREENING FOR OSTEOPOROSIS: ICD-10-CM

## 2019-05-21 DIAGNOSIS — I10 ESSENTIAL HYPERTENSION: ICD-10-CM

## 2019-05-21 DIAGNOSIS — J30.9 ALLERGIC RHINITIS, UNSPECIFIED SEASONALITY, UNSPECIFIED TRIGGER: Primary | ICD-10-CM

## 2019-05-21 DIAGNOSIS — E11.65 UNCONTROLLED TYPE 2 DIABETES MELLITUS WITH HYPERGLYCEMIA (HCC): ICD-10-CM

## 2019-05-21 PROBLEM — E66.9 OBESITY (BMI 30-39.9): Status: ACTIVE | Noted: 2019-05-21

## 2019-05-21 PROCEDURE — 1036F TOBACCO NON-USER: CPT | Performed by: FAMILY MEDICINE

## 2019-05-21 PROCEDURE — 1101F PT FALLS ASSESS-DOCD LE1/YR: CPT | Performed by: FAMILY MEDICINE

## 2019-05-21 PROCEDURE — 3725F SCREEN DEPRESSION PERFORMED: CPT | Performed by: FAMILY MEDICINE

## 2019-05-21 PROCEDURE — 99214 OFFICE O/P EST MOD 30 MIN: CPT | Performed by: FAMILY MEDICINE

## 2019-05-26 DIAGNOSIS — Z79.4 TYPE 2 DIABETES MELLITUS WITH HYPERGLYCEMIA, WITH LONG-TERM CURRENT USE OF INSULIN (HCC): ICD-10-CM

## 2019-05-26 DIAGNOSIS — E11.65 TYPE 2 DIABETES MELLITUS WITH HYPERGLYCEMIA, WITH LONG-TERM CURRENT USE OF INSULIN (HCC): ICD-10-CM

## 2019-05-26 DIAGNOSIS — E78.2 MIXED HYPERLIPIDEMIA: ICD-10-CM

## 2019-05-28 RX ORDER — SIMVASTATIN 80 MG
TABLET ORAL
Qty: 90 TABLET | Refills: 3 | Status: SHIPPED | OUTPATIENT
Start: 2019-05-28 | End: 2020-06-17

## 2019-05-28 RX ORDER — BLOOD SUGAR DIAGNOSTIC
STRIP MISCELLANEOUS
Qty: 100 EACH | Refills: 3 | Status: SHIPPED | OUTPATIENT
Start: 2019-05-28 | End: 2019-08-01 | Stop reason: SDUPTHER

## 2019-05-30 ENCOUNTER — TELEPHONE (OUTPATIENT)
Dept: FAMILY MEDICINE CLINIC | Facility: CLINIC | Age: 84
End: 2019-05-30

## 2019-05-30 DIAGNOSIS — Z20.818 PERTUSSIS EXPOSURE: Primary | ICD-10-CM

## 2019-05-31 RX ORDER — AZITHROMYCIN 250 MG/1
250 TABLET, FILM COATED ORAL EVERY 24 HOURS
Qty: 6 TABLET | Refills: 0 | Status: SHIPPED | OUTPATIENT
Start: 2019-05-31 | End: 2019-06-05

## 2019-07-05 ENCOUNTER — TELEPHONE (OUTPATIENT)
Dept: FAMILY MEDICINE CLINIC | Facility: CLINIC | Age: 84
End: 2019-07-05

## 2019-07-05 DIAGNOSIS — E11.65 TYPE 2 DIABETES MELLITUS WITH HYPERGLYCEMIA, WITH LONG-TERM CURRENT USE OF INSULIN (HCC): ICD-10-CM

## 2019-07-05 DIAGNOSIS — Z79.4 TYPE 2 DIABETES MELLITUS WITH HYPERGLYCEMIA, WITH LONG-TERM CURRENT USE OF INSULIN (HCC): ICD-10-CM

## 2019-07-05 NOTE — TELEPHONE ENCOUNTER
Patient wants 90 day refill on One touch Verio test strips called in to [address] can be reached at [phone] any questions

## 2019-07-05 NOTE — TELEPHONE ENCOUNTER
Refilled on 05/28/19 with 3 refills  Left message for patient with information and to contact pharmacy

## 2019-07-28 DIAGNOSIS — IMO0002 UNCONTROLLED TYPE 2 DIABETES MELLITUS WITH COMPLICATION, WITH LONG-TERM CURRENT USE OF INSULIN: ICD-10-CM

## 2019-08-01 DIAGNOSIS — Z79.4 TYPE 2 DIABETES MELLITUS WITH HYPERGLYCEMIA, WITH LONG-TERM CURRENT USE OF INSULIN (HCC): ICD-10-CM

## 2019-08-01 DIAGNOSIS — E11.65 TYPE 2 DIABETES MELLITUS WITH HYPERGLYCEMIA, WITH LONG-TERM CURRENT USE OF INSULIN (HCC): ICD-10-CM

## 2019-08-01 RX ORDER — BLOOD SUGAR DIAGNOSTIC
STRIP MISCELLANEOUS
Qty: 300 EACH | Refills: 1 | Status: SHIPPED | OUTPATIENT
Start: 2019-08-01 | End: 2020-04-02 | Stop reason: SDUPTHER

## 2019-08-21 ENCOUNTER — HOSPITAL ENCOUNTER (OUTPATIENT)
Dept: BONE DENSITY | Facility: MEDICAL CENTER | Age: 84
Discharge: HOME | End: 2019-08-21
Payer: COMMERCIAL

## 2019-08-21 DIAGNOSIS — Z13.820 SCREENING FOR OSTEOPOROSIS: ICD-10-CM

## 2019-08-21 PROCEDURE — 77080 DXA BONE DENSITY AXIAL: CPT

## 2019-10-22 ENCOUNTER — OFFICE VISIT (OUTPATIENT)
Dept: FAMILY MEDICINE CLINIC | Facility: CLINIC | Age: 84
End: 2019-10-22
Payer: COMMERCIAL

## 2019-10-22 VITALS
OXYGEN SATURATION: 97 % | WEIGHT: 153 LBS | BODY MASS INDEX: 30.04 KG/M2 | SYSTOLIC BLOOD PRESSURE: 140 MMHG | HEART RATE: 84 BPM | HEIGHT: 60 IN | DIASTOLIC BLOOD PRESSURE: 68 MMHG | RESPIRATION RATE: 16 BRPM | TEMPERATURE: 98.2 F

## 2019-10-22 DIAGNOSIS — J40 BRONCHITIS: Primary | ICD-10-CM

## 2019-10-22 PROCEDURE — 99213 OFFICE O/P EST LOW 20 MIN: CPT | Performed by: FAMILY MEDICINE

## 2019-10-22 RX ORDER — DEXTROMETHORPHAN HYDROBROMIDE AND PROMETHAZINE HYDROCHLORIDE 15; 6.25 MG/5ML; MG/5ML
5 SOLUTION ORAL 4 TIMES DAILY PRN
Qty: 180 ML | Refills: 0 | Status: SHIPPED | OUTPATIENT
Start: 2019-10-22 | End: 2019-10-22 | Stop reason: SINTOL

## 2019-10-22 RX ORDER — AZITHROMYCIN 250 MG/1
TABLET, FILM COATED ORAL
Qty: 6 TABLET | Refills: 0 | Status: SHIPPED | OUTPATIENT
Start: 2019-10-22 | End: 2019-10-26

## 2019-10-22 RX ORDER — BENZONATATE 200 MG/1
200 CAPSULE ORAL 3 TIMES DAILY PRN
Qty: 20 CAPSULE | Refills: 0 | Status: SHIPPED | OUTPATIENT
Start: 2019-10-22 | End: 2019-11-26 | Stop reason: ALTCHOICE

## 2019-10-22 NOTE — PROGRESS NOTES
Chief Complaint   Patient presents with    URI     Symptoms chest congestion, non productive cough, post nasal drip, and nasal congestion since last Friday  Assessment/Plan:         Diagnoses and all orders for this visit:    Bronchitis  -     Discontinue: Promethazine-DM (PHENERGAN-DM) 6 25-15 mg/5 mL oral syrup; Take 5 mL by mouth 4 (four) times a day as needed for cough  -     azithromycin (ZITHROMAX) 250 mg tablet; Take 2 tabs on day 1, then take 1 tab daily from day 2-day 5   -     benzonatate (TESSALON) 200 MG capsule; Take 1 capsule (200 mg total) by mouth 3 (three) times a day as needed for cough          Subjective:      Patient ID: Jesusita Lyle is a 81 y o  female  HPI    Pt is here by herself  C/o congestion, cough for 5 days  Cough with phlegm, cannot stop cough sometimes and chest hurt  Denies wheezing or SOB  Denies fever, n/v/abd pain  Had sick contact  No smoking  Denies hx of asthma or COPd  Hx of DM and she is on insulin  Tried OTC cough syrup but she has glaucoma and afraid of side effects  The following portions of the patient's history were reviewed and updated as appropriate: allergies, current medications, past family history, past medical history, past social history, past surgical history and problem list     Review of Systems   Constitutional: Negative for appetite change, chills and fever  HENT: Negative for congestion, ear pain, sinus pain and sore throat  Eyes: Negative for discharge and itching  Respiratory: Positive for cough  Negative for apnea, chest tightness, shortness of breath and wheezing  Cardiovascular: Negative for chest pain, palpitations and leg swelling  Gastrointestinal: Negative for abdominal pain, anal bleeding, constipation, diarrhea, nausea and vomiting  Endocrine: Negative for cold intolerance, heat intolerance and polyuria  Genitourinary: Negative for difficulty urinating and dysuria     Musculoskeletal: Negative for arthralgias, back pain and myalgias  Skin: Negative for rash  Neurological: Negative for dizziness and headaches  Psychiatric/Behavioral: Negative for agitation  Objective:      /68 (BP Location: Left arm, Patient Position: Sitting, Cuff Size: Adult)   Pulse 84   Temp 98 2 °F (36 8 °C) (Tympanic)   Resp 16   Ht 4' 11 75" (1 518 m)   Wt 69 4 kg (153 lb)   SpO2 97%   BMI 30 13 kg/m²          Physical Exam   Constitutional: She appears well-developed  No distress  HENT:   Head: Normocephalic  Right Ear: External ear normal    Left Ear: External ear normal    Mouth/Throat: Oropharynx is clear and moist    B/l nasal swollen   Eyes: Pupils are equal, round, and reactive to light  Conjunctivae are normal  Right eye exhibits no discharge  Left eye exhibits no discharge  Neck: Normal range of motion  No thyromegaly present  Cardiovascular: Normal rate, regular rhythm and normal heart sounds  Exam reveals no gallop and no friction rub  No murmur heard  Pulmonary/Chest: Effort normal and breath sounds normal  No respiratory distress  She has no wheezes  She has no rales  She exhibits no tenderness  Abdominal: Soft  Bowel sounds are normal    Lymphadenopathy:     She has no cervical adenopathy

## 2019-11-05 DIAGNOSIS — IMO0002 UNCONTROLLED TYPE 2 DIABETES MELLITUS WITH COMPLICATION, WITH LONG-TERM CURRENT USE OF INSULIN: ICD-10-CM

## 2019-11-05 NOTE — TELEPHONE ENCOUNTER
Patient wants 90 day refills on Novolog Flexpen 100 units injection pen and Levemir Flextouch 100 units injection pen called in to [id number]  Dipika Neal can be reached at [phone] any questions

## 2019-11-21 ENCOUNTER — APPOINTMENT (OUTPATIENT)
Dept: LAB | Facility: HOSPITAL | Age: 84
End: 2019-11-21
Payer: COMMERCIAL

## 2019-11-21 DIAGNOSIS — E78.5 HYPERLIPIDEMIA, UNSPECIFIED HYPERLIPIDEMIA TYPE: ICD-10-CM

## 2019-11-21 DIAGNOSIS — I10 ESSENTIAL HYPERTENSION: ICD-10-CM

## 2019-11-21 DIAGNOSIS — E11.65 UNCONTROLLED TYPE 2 DIABETES MELLITUS WITH HYPERGLYCEMIA (HCC): ICD-10-CM

## 2019-11-21 LAB
ALBUMIN SERPL BCP-MCNC: 3.5 G/DL (ref 3.5–5)
ALP SERPL-CCNC: 73 U/L (ref 46–116)
ALT SERPL W P-5'-P-CCNC: 32 U/L (ref 12–78)
ANION GAP SERPL CALCULATED.3IONS-SCNC: 5 MMOL/L (ref 4–13)
AST SERPL W P-5'-P-CCNC: 35 U/L (ref 5–45)
BILIRUB SERPL-MCNC: 0.37 MG/DL (ref 0.2–1)
BUN SERPL-MCNC: 19 MG/DL (ref 5–25)
CALCIUM SERPL-MCNC: 9.7 MG/DL (ref 8.3–10.1)
CHLORIDE SERPL-SCNC: 106 MMOL/L (ref 100–108)
CHOLEST SERPL-MCNC: 151 MG/DL (ref 50–200)
CO2 SERPL-SCNC: 27 MMOL/L (ref 21–32)
CREAT SERPL-MCNC: 1.07 MG/DL (ref 0.6–1.3)
CREAT UR-MCNC: 71.9 MG/DL
EST. AVERAGE GLUCOSE BLD GHB EST-MCNC: 157 MG/DL
GFR SERPL CREATININE-BSD FRML MDRD: 48 ML/MIN/1.73SQ M
GLUCOSE P FAST SERPL-MCNC: 132 MG/DL (ref 65–99)
HBA1C MFR BLD: 7.1 % (ref 4.2–6.3)
HDLC SERPL-MCNC: 41 MG/DL
LDLC SERPL CALC-MCNC: 83 MG/DL (ref 0–100)
MICROALBUMIN UR-MCNC: 7 MG/L (ref 0–20)
MICROALBUMIN/CREAT 24H UR: 10 MG/G CREATININE (ref 0–30)
NONHDLC SERPL-MCNC: 110 MG/DL
POTASSIUM SERPL-SCNC: 4.8 MMOL/L (ref 3.5–5.3)
PROT SERPL-MCNC: 7.2 G/DL (ref 6.4–8.2)
SODIUM SERPL-SCNC: 138 MMOL/L (ref 136–145)
TRIGL SERPL-MCNC: 136 MG/DL
TSH SERPL DL<=0.05 MIU/L-ACNC: 2.23 UIU/ML (ref 0.36–3.74)

## 2019-11-21 PROCEDURE — 82570 ASSAY OF URINE CREATININE: CPT

## 2019-11-21 PROCEDURE — 80053 COMPREHEN METABOLIC PANEL: CPT

## 2019-11-21 PROCEDURE — 84443 ASSAY THYROID STIM HORMONE: CPT

## 2019-11-21 PROCEDURE — 83036 HEMOGLOBIN GLYCOSYLATED A1C: CPT

## 2019-11-21 PROCEDURE — 80061 LIPID PANEL: CPT

## 2019-11-21 PROCEDURE — 36415 COLL VENOUS BLD VENIPUNCTURE: CPT

## 2019-11-21 PROCEDURE — 82043 UR ALBUMIN QUANTITATIVE: CPT

## 2019-11-26 ENCOUNTER — OFFICE VISIT (OUTPATIENT)
Dept: FAMILY MEDICINE CLINIC | Facility: CLINIC | Age: 84
End: 2019-11-26
Payer: COMMERCIAL

## 2019-11-26 VITALS
HEART RATE: 84 BPM | OXYGEN SATURATION: 96 % | TEMPERATURE: 98.6 F | DIASTOLIC BLOOD PRESSURE: 56 MMHG | BODY MASS INDEX: 29.92 KG/M2 | WEIGHT: 152.4 LBS | HEIGHT: 60 IN | RESPIRATION RATE: 16 BRPM | SYSTOLIC BLOOD PRESSURE: 148 MMHG

## 2019-11-26 DIAGNOSIS — Z79.4 TYPE 2 DIABETES MELLITUS WITH STAGE 3 CHRONIC KIDNEY DISEASE, WITH LONG-TERM CURRENT USE OF INSULIN (HCC): ICD-10-CM

## 2019-11-26 DIAGNOSIS — E78.5 HYPERLIPIDEMIA, UNSPECIFIED HYPERLIPIDEMIA TYPE: ICD-10-CM

## 2019-11-26 DIAGNOSIS — Z00.00 MEDICARE ANNUAL WELLNESS VISIT, SUBSEQUENT: ICD-10-CM

## 2019-11-26 DIAGNOSIS — H92.01 RIGHT EAR PAIN: Primary | ICD-10-CM

## 2019-11-26 DIAGNOSIS — I10 ESSENTIAL HYPERTENSION: ICD-10-CM

## 2019-11-26 DIAGNOSIS — N18.30 TYPE 2 DIABETES MELLITUS WITH STAGE 3 CHRONIC KIDNEY DISEASE, WITH LONG-TERM CURRENT USE OF INSULIN (HCC): ICD-10-CM

## 2019-11-26 DIAGNOSIS — E11.22 TYPE 2 DIABETES MELLITUS WITH STAGE 3 CHRONIC KIDNEY DISEASE, WITH LONG-TERM CURRENT USE OF INSULIN (HCC): ICD-10-CM

## 2019-11-26 PROCEDURE — 99214 OFFICE O/P EST MOD 30 MIN: CPT | Performed by: FAMILY MEDICINE

## 2019-11-26 PROCEDURE — 1160F RVW MEDS BY RX/DR IN RCRD: CPT | Performed by: FAMILY MEDICINE

## 2019-11-26 PROCEDURE — G0439 PPPS, SUBSEQ VISIT: HCPCS | Performed by: FAMILY MEDICINE

## 2019-11-26 PROCEDURE — 1101F PT FALLS ASSESS-DOCD LE1/YR: CPT | Performed by: FAMILY MEDICINE

## 2019-11-26 PROCEDURE — 1036F TOBACCO NON-USER: CPT | Performed by: FAMILY MEDICINE

## 2019-11-26 PROCEDURE — 3725F SCREEN DEPRESSION PERFORMED: CPT | Performed by: FAMILY MEDICINE

## 2019-11-26 NOTE — PROGRESS NOTES
Assessment/Plan:    BMI Counseling: Body mass index is 29 76 kg/m²  The BMI is above normal  Nutrition recommendations include reducing portion sizes, decreasing overall calorie intake and 3-5 servings of fruits/vegetables daily  Exercise recommendations include moderate aerobic physical activity for 150 minutes/week  Type 2 diabetes mellitus with stage 3 chronic kidney disease, with long-term current use of insulin (HCC)    Lab Results   Component Value Date    HGBA1C 7 1 (H) 11/21/2019     Controlled  Low carb diet  Continue levemir 37u am and 34u pm and novolog 9/10/10u with meals  Hypertension  Controlled  DASH diet  Continue losartan 50mg QD  Hyperlipidemia  11/2019 Lipid 151/136/41/83 ok  Low fat diet  Continue simvastatin 80mg qhs  Reviewed lab in 11/2019  M/c 10 good  TSH normal  Lipid ok  HgA1C 7 1 stable  CMP stable     Got flu shot this season per pt    Got prevnar 4/2015  Got pneumovax 2016  Got zostavax in 10/2014    Will check insurance for coverage of Tdap  Got shingrix    Dexa 8/2019, osteopenia, stable  Pt is on calcium/vitD and exercise regularly  Fall precautions  RTO in 6 months with labs      PoA---daughter  Living will----Full code     Diagnoses and all orders for this visit:    Right ear pain  -     Ambulatory Referral to Otolaryngology; Future    Type 2 diabetes mellitus with stage 3 chronic kidney disease, with long-term current use of insulin (HCC)  -     CBC; Future  -     Comprehensive metabolic panel; Future  -     Hemoglobin A1C With EAG; Future    Essential hypertension  -     Comprehensive metabolic panel; Future    Hyperlipidemia, unspecified hyperlipidemia type  -     Lipid panel; Future    BMI 29 0-29 9,adult    Medicare annual wellness visit, subsequent    Other orders  -     Cancel: Ambulatory referral to Gastroenterology; Future          Subjective:      Patient ID: Jo-Ann Brandon is a 81 y o  female  HPI    Pt is here by herself     C/o right ear pain for 2 weeks  She states her daughter saw a blister last night  Hearing ok  Denies fever, SOB, CP, n/v/abd pain  DM---11/2019 hgA1C 7 1 stable  Pt states she is on levemir 37u am and 34u pm  Fasting BS   Usually novolog 9u/10u/10u with meals  Occasionally hypoglycemia  Denies neuropathy  [facility] opthalmology Dr Jimmie Galindo and Dr Rui Esquivel  Had Left cataract surgery in 5/2014  Right eye cannot see  [facility] retinal specialist also per pt  [facility] podiatry Dr Antonia Devine Q 3-4 months       HTN----She is on losartan 50mg QD  Denies SOB, CP, headache etc    Does not check BP at home  Today BP is ok       Hyperlipidemia----she is on simvastatin 80mg qhs  Denies side effects       Pt lives by herself  Does all ADL's  Still driving  Had children and great-grandson living close to her  Taking care of great-grandson once per week  Denies recent falls  Denies depression        The following portions of the patient's history were reviewed and updated as appropriate: allergies, current medications, past family history, past medical history, past social history, past surgical history and problem list     Review of Systems   Constitutional: Negative for appetite change, chills and fever  HENT: Positive for ear pain  Negative for congestion, sinus pain and sore throat  Eyes: Negative for discharge and itching  Respiratory: Negative for apnea, cough, chest tightness, shortness of breath and wheezing  Cardiovascular: Negative for chest pain, palpitations and leg swelling  Gastrointestinal: Negative for abdominal pain, anal bleeding, constipation, diarrhea, nausea and vomiting  Endocrine: Negative for cold intolerance, heat intolerance and polyuria  Genitourinary: Negative for difficulty urinating and dysuria  Musculoskeletal: Negative for arthralgias, back pain and myalgias  Skin: Negative for rash  Neurological: Negative for dizziness and headaches  Psychiatric/Behavioral: Negative for agitation  Objective:      /56 (BP Location: Left arm, Patient Position: Sitting, Cuff Size: Adult)   Pulse 84   Temp 98 6 °F (37 °C) (Tympanic)   Resp 16   Ht 5' (1 524 m)   Wt 69 1 kg (152 lb 6 4 oz)   SpO2 96%   BMI 29 76 kg/m²          Physical Exam   Constitutional: She appears well-developed  No distress  HENT:   Head: Normocephalic  Left Ear: External ear normal    Nose: Nose normal    Mouth/Throat: Oropharynx is clear and moist    Right ear external canal blister, size 5mm, no erythema or exudate   Eyes: Pupils are equal, round, and reactive to light  Conjunctivae are normal  Right eye exhibits no discharge  Left eye exhibits no discharge  Neck: Normal range of motion  No thyromegaly present  Cardiovascular: Normal rate, regular rhythm and normal heart sounds  Exam reveals no gallop and no friction rub  No murmur heard  Pulmonary/Chest: Effort normal and breath sounds normal  No respiratory distress  She has no wheezes  She has no rales  She exhibits no tenderness  Abdominal: Soft  Bowel sounds are normal  She exhibits no distension and no mass  There is no tenderness  There is no rebound and no guarding  Musculoskeletal: Normal range of motion  She exhibits no edema  Lymphadenopathy:     She has no cervical adenopathy  Neurological: She is alert  Psychiatric: She has a normal mood and affect

## 2019-11-26 NOTE — ASSESSMENT & PLAN NOTE
Lab Results   Component Value Date    HGBA1C 7 1 (H) 11/21/2019     Controlled  Low carb diet  Continue levemir 37u am and 34u pm and novolog 9/10/10u with meals

## 2019-11-26 NOTE — PROGRESS NOTES
Chief Complaint   Patient presents with   [facility] Wellness Visit     Subsequent   Follow-up     6 months and review labs  Health Maintenance   Topic Date Due    CRC Screening: Colonoscopy  1935    Hepatitis B Vaccine (1 of 3 - Risk 3-dose series) [birth year]31/1954    DTaP,Tdap,and Td Vaccines (2 - Td) 01/01/2010    DM Eye Exam  10/31/2018    Urinary Incontinence Screening  11/20/2019    Medicare Annual Wellness Visit (AWV)  11/20/2019    Diabetic Foot Exam  04/18/2020    Fall Risk  05/21/2020    Depression Screening PHQ  05/21/2020    BMI: Followup Plan  05/21/2020    HEMOGLOBIN A1C  05/21/2020    BMI: Adult  10/22/2020    URINE MICROALBUMIN  11/21/2020    DXA SCAN  08/21/2022    Influenza Vaccine  Completed    Pneumococcal Vaccine: 65+ Years  Completed    Pneumococcal Vaccine: Pediatrics (0 to 5 Years) and At-Risk Patients (6 to 59 Years)  Aged Out    HIB Vaccine  Aged Out    IPV Vaccine  Aged Out    Hepatitis A Vaccine  Aged Out    Meningococcal ACWY Vaccine  Aged Out    HPV Vaccine  Aged Out     Assessment and Plan:     Problem List Items Addressed This Visit        Endocrine    Type 2 diabetes mellitus with stage 3 chronic kidney disease, with long-term current use of insulin ([address] )       Lab Results   Component Value Date    HGBA1C 7 1 (H) 11/21/2019     Controlled  Low carb diet  Continue levemir 37u am and 34u pm and novolog 9/10/10u with meals  Relevant Orders    CBC    Comprehensive metabolic panel    Hemoglobin A1C With EAG       Cardiovascular and Mediastinum    Hypertension     Controlled  DASH diet  Continue losartan 50mg QD  Relevant Orders    Comprehensive metabolic panel       Other    Hyperlipidemia     11/2019 Lipid 151/136/41/83 ok  Low fat diet  Continue simvastatin 80mg qhs            Relevant Orders    Lipid panel    BMI 29 0-29 9,adult      Other Visit Diagnoses     Right ear pain    -  Primary    Relevant Orders    Ambulatory Referral to Otolaryngology    Medicare annual wellness visit, subsequent               Preventive health issues were discussed with patient, and age appropriate screening tests were ordered as noted in patient's After Visit Summary  Personalized health advice and appropriate referrals for health education or preventive services given if needed, as noted in patient's After Visit Summary       History of Present Illness:     Patient presents for Medicare Annual Wellness visit    Patient Care Team:  Arely Cannon MD as PCP - General     Problem List:     Patient Active Problem List   Diagnosis    Allergic rhinitis    Alopecia    Carpal tunnel syndrome    Type 2 diabetes mellitus with stage 3 chronic kidney disease, with long-term current use of insulin ([address] )    Esophageal reflux    Gallstone    Glaucoma    Hyperlipidemia    Hypertension    Osteoarthritis    Osteopenia    Spinal stenosis    Vitamin D deficiency    BMI 29 0-29 9,adult      Past Medical and Surgical History:     Past Medical History:   Diagnosis Date    Carpal tunnel syndrome     Choledocholithiasis     resolved 3/6/2015    Diverticulosis     Inguinal hernia     Sciatica      Past Surgical History:   Procedure Laterality Date    ESOPHAGOGASTRODUODENOSCOPY      resolved 1/2002    HEMORRHOID SURGERY      resolved 2/1999    NEUROPLASTY / TRANSPOSITION MEDIAN NERVE AT CARPAL TUNNEL Right     last assessed 8/10/2012    RECTAL SURGERY        Family History:     Family History   Problem Relation Age of Onset    Heart attack Mother         MI    Coronary artery disease Mother     Diabetes Mother         DM    Hypertension Mother     Stroke Mother         syndrome    Stroke Father         syndrome      Social History:     Social History     Socioeconomic History    Marital status: Unknown     Spouse name: None    Number of children: None    Years of education: None    Highest education level: None   Occupational History    Occupation: retired Social Needs    Financial resource strain: None    Food insecurity:     Worry: None     Inability: None    Transportation needs:     Medical: None     Non-medical: None   Tobacco Use    Smoking status: Never Smoker    Smokeless tobacco: Never Used   Substance and Sexual Activity    Alcohol use: No    Drug use: No    Sexual activity: None   Lifestyle    Physical activity:     Days per week: None     Minutes per session: None    Stress: None   Relationships    Social connections:     Talks on phone: None     Gets together: None     Attends [employer] service: None     Active member of club or organization: None     Attends meetings of clubs or organizations: None     Relationship status: None    Intimate partner violence:     Fear of current or ex partner: None     Emotionally abused: None     Physically abused: None     Forced sexual activity: None   Other Topics Concern    None   Social History Narrative    Always uses seat belt            Medications and Allergies:     Current Outpatient Medications   Medication Sig Dispense Refill    brimonidine (ALPHAGAN P) 0 1 % Apply to eye      Calcium Carbonate-Vit D-Min (CALCIUM 1200) 1151-8530 MG-UNIT CHEW Chew      Cholecalciferol (VITAMIN D3) 5000 UNIT/ML LIQD Take by mouth      insulin aspart (NOVOLOG FLEXPEN) 100 Units/mL injection pen INJECT 10 UNITS WITH MEALS AND INSULIN SLIDING SCALE AS NEEDED 10 pen 1    insulin detemir (LEVEMIR FLEXTOUCH) 100 Units/mL injection pen Use 37u in the morning and 34u in the evening or as instructed 25 pen 1    Insulin Pen Needle (B-D ULTRAFINE III SHORT PEN) 31G X 8 MM MISC by Does not apply route 4 (four) times a day for 30 days 200 each 5    latanoprost (XALATAN) 0 005 % ophthalmic solution Apply to eye      losartan (COZAAR) 50 mg tablet Take 1 tablet (50 mg total) by mouth daily for 90 days 90 tablet 3    ONETOUCH DELICA LANCETS FINE MISC Check blood sugar 4 times per day 400 each 1    ONETOUCH VERIO test strip TEST 3 TIMES DAILY  300 each 1    simvastatin (ZOCOR) 80 mg tablet TAKE 1 TABLET BY MOUTH EVERY DAY 90 tablet 3     No current facility-administered medications for this visit  Allergies   Allergen Reactions    Ace Inhibitors Cough    Metformin Diarrhea      Immunizations:     Immunization History   Administered Date(s) Administered    INFLUENZA 12/03/2004    Influenza Split High Dose Preservative Free IM 12/29/2014, 11/08/2019    Influenza TIV (IM) 10/06/2010, 11/08/2012    Influenza, high dose seasonal 0 5 mL 10/09/2018    Pneumococcal Conjugate 13-Valent 04/29/2015    Pneumococcal Polysaccharide PPV23 11/01/2002, 11/10/2016    Tdap 01/01/2000    Zoster Vaccine Recombinant 07/05/2019, 10/08/2019      Health Maintenance:         Topic Date Due    CRC Screening: Colonoscopy  1935    DXA SCAN  08/21/2022         Topic Date Due    Hepatitis B Vaccine (1 of 3 - Risk 3-dose series) 03[birth year]31/1955    DTaP,Tdap,and Td Vaccines (2 - Td) 01/01/2010      Medicare Health Risk Assessment:     /56 (BP Location: Left arm, Patient Position: Sitting, Cuff Size: Adult)   Pulse 84   Temp 98 6 °F (37 °C) (Tympanic)   Resp 16   Ht 5' (1 524 m)   Wt 69 1 kg (152 lb 6 4 oz)   SpO2 96%   BMI 29 76 kg/m²      Kimani Romero is here for her Subsequent Wellness visit  Health Risk Assessment:   Patient rates overall health as good  Patient feels that their physical health rating is same  Eyesight was rated as same  Hearing was rated as same  Patient feels that their emotional and mental health rating is same  Pain experienced in the last 7 days has been some  Patient's pain rating has been 3/10  Depression Screening:   PHQ-2 Score: 0      Fall Risk Screening: In the past year, patient has experienced: no history of falling in past year      Urinary Incontinence Screening:   Patient has not leaked urine accidently in the last six months       Home Safety:  Patient does not have trouble with stairs inside or outside of their home  Patient has working smoke alarms and has no working carbon monoxide detector  Home safety hazards include: none  Nutrition:   Current diet is Diabetic, Low Cholesterol, Low Saturated Fat, Low Carb and No Added Salt  Medications:   Patient is currently taking over-the-counter supplements  OTC medications include: see medication list  Patient is able to manage medications  Activities of Daily Living (ADLs)/Instrumental Activities of Daily Living (IADLs):   Walk and transfer into and out of bed and chair?: Yes  Dress and groom yourself?: Yes    Bathe or shower yourself?: Yes    Feed yourself? Yes  Do your laundry/housekeeping?: Yes  Manage your money, pay your bills and track your expenses?: Yes  Make your own meals?: Yes    Do your own shopping?: Yes    Previous Hospitalizations:   Any hospitalizations or ED visits within the last 12 months?: No      Advance Care Planning:   Living will: Yes    Durable POA for healthcare:  Yes    Advanced directive: Yes      Cognitive Screening:   Provider or family/friend/caregiver concerned regarding cognition?: No    PREVENTIVE SCREENINGS      Cardiovascular Screening:    General: History Lipid Disorder and Screening Current      Diabetes Screening:     General: History Diabetes and Screening Current      Colorectal Cancer Screening:     General: Screening Not Indicated      Breast Cancer Screening:     General: Screening Not Indicated      Cervical Cancer Screening:    General: Screening Not Indicated      Osteoporosis Screening:    General: Screening Current      Venita Pérez MD

## 2019-11-26 NOTE — PATIENT INSTRUCTIONS
Medicare Preventive Visit Patient Instructions  Thank you for completing your Welcome to Medicare Visit or Medicare Annual Wellness Visit today  Your next wellness visit will be due in one year (11/26/2020)  The screening/preventive services that you may require over the next 5-10 years are detailed below  Some tests may not apply to you based off risk factors and/or age  Screening tests ordered at today's visit but not completed yet may show as past due  Also, please note that scanned in results may not display below  Preventive Screenings:  Service Recommendations Previous Testing/Comments   Colorectal Cancer Screening  * Colonoscopy    * Fecal Occult Blood Test (FOBT)/Fecal Immunochemical Test (FIT)  * Fecal DNA/Cologuard Test  * Flexible Sigmoidoscopy Age: 54-65 years old   Colonoscopy: every 10 years (may be performed more frequently if at higher risk)  OR  FOBT/FIT: every 1 year  OR  Cologuard: every 3 years  OR  Sigmoidoscopy: every 5 years  Screening may be recommended earlier than age 48 if at higher risk for colorectal cancer  Also, an individualized decision between you and your healthcare provider will decide whether screening between the ages of 74-80 would be appropriate  Colonoscopy: Not on file  FOBT/FIT: Not on file  Cologuard: Not on file  Sigmoidoscopy: 05/17/2019         Breast Cancer Screening Age: 36 years old  Frequency: every 1-2 years  Not required if history of left and right mastectomy Mammogram: Not on file       Cervical Cancer Screening Between the ages of 21-29, pap smear recommended once every 3 years  Between the ages of 33-67, can perform pap smear with HPV co-testing every 5 years     Recommendations may differ for women with a history of total hysterectomy, cervical cancer, or abnormal pap smears in past  Pap Smear: Not on file    Screening Not Indicated   Hepatitis C Screening Once for adults born between [facility]  More frequently in patients at high risk for Hepatitis C Hep C Antibody: Not on file       Diabetes Screening 1-2 times per year if you're at risk for diabetes or have pre-diabetes Fasting glucose: 132 mg/dL   A1C: 7 1 %    Screening Not Indicated  History Diabetes   Cholesterol Screening Once every 5 years if you don't have a lipid disorder  May order more often based on risk factors  Lipid panel: 11/21/2019    Screening Not Indicated  History Lipid Disorder     Other Preventive Screenings Covered by Medicare:  1  Abdominal Aortic Aneurysm (AAA) Screening: covered once if your at risk  You're considered to be at risk if you have a family history of AAA  2  Lung Cancer Screening: covers low dose CT scan once per year if you meet all of the following conditions: (1) Age 50-69; (2) No signs or symptoms of lung cancer; (3) Current smoker or have quit smoking within the last 15 years; (4) You have a tobacco smoking history of at least 30 pack years (packs per day multiplied by number of years you smoked); (5) You get a written order from a healthcare provider  3  Glaucoma Screening: covered annually if you're considered high risk: (1) You have diabetes OR (2) Family history of glaucoma OR (3)  aged 48 and older OR (3)  American aged 72 and older  3  Osteoporosis Screening: covered every 2 years if you meet one of the following conditions: (1) You're estrogen deficient and at risk for osteoporosis based off medical history and other findings; (2) Have a vertebral abnormality; (3) On glucocorticoid therapy for more than 3 months; (4) Have primary hyperparathyroidism; (5) On osteoporosis medications and need to assess response to drug therapy  · Last bone density test (DXA Scan): 08/22/2019   5  HIV Screening: covered annually if you're between the age of 15-65  Also covered annually if you are younger than 13 and older than 72 with risk factors for HIV infection   For pregnant patients, it is covered up to 3 times per pregnancy  Immunizations:  Immunization Recommendations   Influenza Vaccine Annual influenza vaccination during flu season is recommended for all persons aged >= 6 months who do not have contraindications   Pneumococcal Vaccine (Prevnar and Pneumovax)  * Prevnar = PCV13  * Pneumovax = PPSV23   Adults 25-60 years old: 1-3 doses may be recommended based on certain risk factors  Adults 72 years old: Prevnar (PCV13) vaccine recommended followed by Pneumovax (PPSV23) vaccine  If already received PPSV23 since turning 65, then PCV13 recommended at least one year after PPSV23 dose  Hepatitis B Vaccine 3 dose series if at intermediate or high risk (ex: diabetes, end stage renal disease, liver disease)   Tetanus (Td) Vaccine - COST NOT COVERED BY MEDICARE PART B Following completion of primary series, a booster dose should be given every 10 years to maintain immunity against tetanus  Td may also be given as tetanus wound prophylaxis  Tdap Vaccine - COST NOT COVERED BY MEDICARE PART B Recommended at least once for all adults  For pregnant patients, recommended with each pregnancy  Shingles Vaccine (Shingrix) - COST NOT COVERED BY MEDICARE PART B  2 shot series recommended in those aged 48 and above     Health Maintenance Due:      Topic Date Due    CRC Screening: Colonoscopy  1935    DXA SCAN  08/21/2022     Immunizations Due:      Topic Date Due    Hepatitis B Vaccine (1 of 3 - Risk 3-dose series) 03[birth year]31/1955    DTaP,Tdap,and Td Vaccines (2 - Td) 01/01/2010     Advance Directives   What are advance directives? Advance directives are legal documents that state your wishes and plans for medical care  These plans are made ahead of time in case you lose your ability to make decisions for yourself  Advance directives can apply to any medical decision, such as the treatments you want, and if you want to donate organs  What are the types of advance directives?   There are many types of advance directives, and each state has rules about how to use them  You may choose a combination of any of the following:  · Living will: This is a written record of the treatment you want  You can also choose which treatments you do not want, which to limit, and which to stop at a certain time  This includes surgery, medicine, IV fluid, and tube feedings  · Durable power of  for healthcare [facility]): This is a written record that states who you want to make healthcare choices for you when you are unable to make them for yourself  This person, called a proxy, is usually a family member or a friend  You may choose more than 1 proxy  · Do not resuscitate (DNR) order:  A DNR order is used in case your heart stops beating or you stop breathing  It is a request not to have certain forms of treatment, such as CPR  A DNR order may be included in other types of advance directives  · Medical directive: This covers the care that you want if you are in a coma, near death, or unable to make decisions for yourself  You can list the treatments you want for each condition  Treatment may include pain medicine, surgery, blood transfusions, dialysis, IV or tube feedings, and a ventilator (breathing machine)  · Values history: This document has questions about your views, beliefs, and how you feel and think about life  This information can help others choose the care that you would choose  Why are advance directives important? An advance directive helps you control your care  Although spoken wishes may be used, it is better to have your wishes written down  Spoken wishes can be misunderstood, or not followed  Treatments may be given even if you do not want them  An advance directive may make it easier for your family to make difficult choices about your care     Weight Management   Why it is important to manage your weight:  Being overweight increases your risk of health conditions such as heart disease, high blood pressure, type 2 diabetes, and certain types of cancer  It can also increase your risk for osteoarthritis, sleep apnea, and other respiratory problems  Aim for a slow, steady weight loss  Even a small amount of weight loss can lower your risk of health problems  How to lose weight safely:  A safe and healthy way to lose weight is to eat fewer calories and get regular exercise  You can lose up about 1 pound a week by decreasing the number of calories you eat by 500 calories each day  Healthy meal plan for weight management:  A healthy meal plan includes a variety of foods, contains fewer calories, and helps you stay healthy  A healthy meal plan includes the following:  · Eat whole-grain foods more often  A healthy meal plan should contain fiber  Fiber is the part of grains, fruits, and vegetables that is not broken down by your body  Whole-grain foods are healthy and provide extra fiber in your diet  Some examples of whole-grain foods are whole-wheat breads and pastas, oatmeal, brown rice, and bulgur  · Eat a variety of vegetables every day  Include dark, leafy greens such as spinach, kale, suzan greens, and mustard greens  Eat yellow and orange vegetables such as carrots, sweet potatoes, and winter squash  · Eat a variety of fruits every day  Choose fresh or canned fruit (canned in its own juice or light syrup) instead of juice  Fruit juice has very little or no fiber  · Eat low-fat dairy foods  Drink fat-free (skim) milk or 1% milk  Eat fat-free yogurt and low-fat cottage cheese  Try low-fat cheeses such as mozzarella and other reduced-fat cheeses  · Choose meat and other protein foods that are low in fat  Choose beans or other legumes such as split peas or lentils  Choose fish, skinless poultry (chicken or turkey), or lean cuts of red meat (beef or pork)  Before you cook meat or poultry, cut off any visible fat  · Use less fat and oil  Try baking foods instead of frying them   Add less fat, such as margarine, sour cream, regular salad dressing and mayonnaise to foods  Eat fewer high-fat foods  Some examples of high-fat foods include french fries, doughnuts, ice cream, and cakes  · Eat fewer sweets  Limit foods and drinks that are high in sugar  This includes candy, cookies, regular soda, and sweetened drinks  Exercise:  Exercise at least 30 minutes per day on most days of the week  Some examples of exercise include walking, biking, dancing, and swimming  You can also fit in more physical activity by taking the stairs instead of the elevator or parking farther away from stores  Ask your healthcare provider about the best exercise plan for you  © Copyright [employer] 2018 Information is for End User's use only and may not be sold, redistributed or otherwise used for commercial purposes   All illustrations and images included in CareNotes® are the copyrighted property of A D A M , Inc  or [address] [place] [address]

## 2019-12-16 LAB
LEFT EYE DIABETIC RETINOPATHY: NORMAL
RIGHT EYE DIABETIC RETINOPATHY: NORMAL

## 2020-03-07 DIAGNOSIS — I10 HYPERTENSION, UNSPECIFIED TYPE: ICD-10-CM

## 2020-03-10 RX ORDER — LOSARTAN POTASSIUM 50 MG/1
50 TABLET ORAL DAILY
Qty: 90 TABLET | Refills: 3 | Status: SHIPPED | OUTPATIENT
Start: 2020-03-10 | End: 2021-02-03

## 2020-04-02 DIAGNOSIS — E11.65 TYPE 2 DIABETES MELLITUS WITH HYPERGLYCEMIA, WITH LONG-TERM CURRENT USE OF INSULIN (HCC): ICD-10-CM

## 2020-04-02 DIAGNOSIS — Z79.4 TYPE 2 DIABETES MELLITUS WITH HYPERGLYCEMIA, WITH LONG-TERM CURRENT USE OF INSULIN (HCC): ICD-10-CM

## 2020-04-03 DIAGNOSIS — Z79.4 TYPE 2 DIABETES MELLITUS WITH HYPERGLYCEMIA, WITH LONG-TERM CURRENT USE OF INSULIN (HCC): ICD-10-CM

## 2020-04-03 DIAGNOSIS — E11.65 TYPE 2 DIABETES MELLITUS WITH HYPERGLYCEMIA, WITH LONG-TERM CURRENT USE OF INSULIN (HCC): ICD-10-CM

## 2020-04-03 RX ORDER — BLOOD SUGAR DIAGNOSTIC
STRIP MISCELLANEOUS
Qty: 300 EACH | Refills: 1 | Status: SHIPPED | OUTPATIENT
Start: 2020-04-03 | End: 2020-09-16 | Stop reason: SDUPTHER

## 2020-05-21 DIAGNOSIS — IMO0002 UNCONTROLLED TYPE 2 DIABETES MELLITUS WITH COMPLICATION, WITH LONG-TERM CURRENT USE OF INSULIN: ICD-10-CM

## 2020-05-26 ENCOUNTER — APPOINTMENT (OUTPATIENT)
Dept: LAB | Facility: HOSPITAL | Age: 85
End: 2020-05-26
Payer: COMMERCIAL

## 2020-05-26 DIAGNOSIS — E11.22 TYPE 2 DIABETES MELLITUS WITH STAGE 3 CHRONIC KIDNEY DISEASE, WITH LONG-TERM CURRENT USE OF INSULIN (HCC): Primary | ICD-10-CM

## 2020-05-26 DIAGNOSIS — E78.5 HYPERLIPIDEMIA, UNSPECIFIED HYPERLIPIDEMIA TYPE: ICD-10-CM

## 2020-05-26 DIAGNOSIS — I10 ESSENTIAL HYPERTENSION: ICD-10-CM

## 2020-05-26 DIAGNOSIS — N18.30 TYPE 2 DIABETES MELLITUS WITH STAGE 3 CHRONIC KIDNEY DISEASE, WITH LONG-TERM CURRENT USE OF INSULIN (HCC): Primary | ICD-10-CM

## 2020-05-26 DIAGNOSIS — Z79.4 TYPE 2 DIABETES MELLITUS WITH STAGE 3 CHRONIC KIDNEY DISEASE, WITH LONG-TERM CURRENT USE OF INSULIN (HCC): Primary | ICD-10-CM

## 2020-05-26 LAB
ALBUMIN SERPL BCP-MCNC: 3.6 G/DL (ref 3.5–5)
ALP SERPL-CCNC: 73 U/L (ref 46–116)
ALT SERPL W P-5'-P-CCNC: 22 U/L (ref 12–78)
ANION GAP SERPL CALCULATED.3IONS-SCNC: 5 MMOL/L (ref 4–13)
AST SERPL W P-5'-P-CCNC: 20 U/L (ref 5–45)
BILIRUB SERPL-MCNC: 0.64 MG/DL (ref 0.2–1)
BUN SERPL-MCNC: 22 MG/DL (ref 5–25)
CALCIUM SERPL-MCNC: 9.5 MG/DL (ref 8.3–10.1)
CHLORIDE SERPL-SCNC: 108 MMOL/L (ref 100–108)
CHOLEST SERPL-MCNC: 132 MG/DL (ref 50–200)
CO2 SERPL-SCNC: 28 MMOL/L (ref 21–32)
CREAT SERPL-MCNC: 0.98 MG/DL (ref 0.6–1.3)
ERYTHROCYTE [DISTWIDTH] IN BLOOD BY AUTOMATED COUNT: 12.2 % (ref 11.6–15.1)
EST. AVERAGE GLUCOSE BLD GHB EST-MCNC: 137 MG/DL
GFR SERPL CREATININE-BSD FRML MDRD: 53 ML/MIN/1.73SQ M
GLUCOSE P FAST SERPL-MCNC: 120 MG/DL (ref 65–99)
HBA1C MFR BLD: 6.4 %
HCT VFR BLD AUTO: 43.7 % (ref 34.8–46.1)
HDLC SERPL-MCNC: 40 MG/DL
HGB BLD-MCNC: 14.1 G/DL (ref 11.5–15.4)
LDLC SERPL CALC-MCNC: 68 MG/DL (ref 0–100)
MCH RBC QN AUTO: 30.3 PG (ref 26.8–34.3)
MCHC RBC AUTO-ENTMCNC: 32.3 G/DL (ref 31.4–37.4)
MCV RBC AUTO: 94 FL (ref 82–98)
NONHDLC SERPL-MCNC: 92 MG/DL
PLATELET # BLD AUTO: 203 THOUSANDS/UL (ref 149–390)
PMV BLD AUTO: 10.4 FL (ref 8.9–12.7)
POTASSIUM SERPL-SCNC: 4.2 MMOL/L (ref 3.5–5.3)
PROT SERPL-MCNC: 6.9 G/DL (ref 6.4–8.2)
RBC # BLD AUTO: 4.65 MILLION/UL (ref 3.81–5.12)
SODIUM SERPL-SCNC: 141 MMOL/L (ref 136–145)
TRIGL SERPL-MCNC: 120 MG/DL
WBC # BLD AUTO: 6.7 THOUSAND/UL (ref 4.31–10.16)

## 2020-05-26 PROCEDURE — 36415 COLL VENOUS BLD VENIPUNCTURE: CPT

## 2020-05-26 PROCEDURE — 80053 COMPREHEN METABOLIC PANEL: CPT

## 2020-05-26 PROCEDURE — 85027 COMPLETE CBC AUTOMATED: CPT

## 2020-05-26 PROCEDURE — 83036 HEMOGLOBIN GLYCOSYLATED A1C: CPT

## 2020-05-26 PROCEDURE — 80061 LIPID PANEL: CPT

## 2020-06-02 ENCOUNTER — OFFICE VISIT (OUTPATIENT)
Dept: FAMILY MEDICINE CLINIC | Facility: CLINIC | Age: 85
End: 2020-06-02
Payer: COMMERCIAL

## 2020-06-02 VITALS
HEIGHT: 60 IN | WEIGHT: 153.6 LBS | TEMPERATURE: 98 F | OXYGEN SATURATION: 97 % | HEART RATE: 84 BPM | BODY MASS INDEX: 30.15 KG/M2 | SYSTOLIC BLOOD PRESSURE: 140 MMHG | RESPIRATION RATE: 20 BRPM | DIASTOLIC BLOOD PRESSURE: 68 MMHG

## 2020-06-02 DIAGNOSIS — E78.5 HYPERLIPIDEMIA, UNSPECIFIED HYPERLIPIDEMIA TYPE: ICD-10-CM

## 2020-06-02 DIAGNOSIS — Z79.4 TYPE 2 DIABETES MELLITUS WITH STAGE 3 CHRONIC KIDNEY DISEASE, WITH LONG-TERM CURRENT USE OF INSULIN (HCC): Primary | ICD-10-CM

## 2020-06-02 DIAGNOSIS — E11.22 TYPE 2 DIABETES MELLITUS WITH STAGE 3 CHRONIC KIDNEY DISEASE, WITH LONG-TERM CURRENT USE OF INSULIN (HCC): Primary | ICD-10-CM

## 2020-06-02 DIAGNOSIS — N18.30 TYPE 2 DIABETES MELLITUS WITH STAGE 3 CHRONIC KIDNEY DISEASE, WITH LONG-TERM CURRENT USE OF INSULIN (HCC): Primary | ICD-10-CM

## 2020-06-02 DIAGNOSIS — I10 ESSENTIAL HYPERTENSION: ICD-10-CM

## 2020-06-02 PROCEDURE — 3077F SYST BP >= 140 MM HG: CPT | Performed by: FAMILY MEDICINE

## 2020-06-02 PROCEDURE — 3078F DIAST BP <80 MM HG: CPT | Performed by: FAMILY MEDICINE

## 2020-06-02 PROCEDURE — 3066F NEPHROPATHY DOC TX: CPT | Performed by: FAMILY MEDICINE

## 2020-06-02 PROCEDURE — 1160F RVW MEDS BY RX/DR IN RCRD: CPT | Performed by: FAMILY MEDICINE

## 2020-06-02 PROCEDURE — 3008F BODY MASS INDEX DOCD: CPT | Performed by: FAMILY MEDICINE

## 2020-06-02 PROCEDURE — 2022F DILAT RTA XM EVC RTNOPTHY: CPT | Performed by: FAMILY MEDICINE

## 2020-06-02 PROCEDURE — 3044F HG A1C LEVEL LT 7.0%: CPT | Performed by: FAMILY MEDICINE

## 2020-06-02 PROCEDURE — 3288F FALL RISK ASSESSMENT DOCD: CPT | Performed by: FAMILY MEDICINE

## 2020-06-02 PROCEDURE — 4040F PNEUMOC VAC/ADMIN/RCVD: CPT | Performed by: FAMILY MEDICINE

## 2020-06-02 PROCEDURE — 1101F PT FALLS ASSESS-DOCD LE1/YR: CPT | Performed by: FAMILY MEDICINE

## 2020-06-02 PROCEDURE — 99214 OFFICE O/P EST MOD 30 MIN: CPT | Performed by: FAMILY MEDICINE

## 2020-06-02 PROCEDURE — 1036F TOBACCO NON-USER: CPT | Performed by: FAMILY MEDICINE

## 2020-06-17 DIAGNOSIS — E78.2 MIXED HYPERLIPIDEMIA: ICD-10-CM

## 2020-06-17 RX ORDER — SIMVASTATIN 80 MG
TABLET ORAL
Qty: 90 TABLET | Refills: 3 | Status: SHIPPED | OUTPATIENT
Start: 2020-06-17 | End: 2021-06-08 | Stop reason: SDUPTHER

## 2020-07-16 ENCOUNTER — OFFICE VISIT (OUTPATIENT)
Dept: FAMILY MEDICINE CLINIC | Facility: CLINIC | Age: 85
End: 2020-07-16
Payer: COMMERCIAL

## 2020-07-16 VITALS
HEIGHT: 60 IN | HEART RATE: 88 BPM | SYSTOLIC BLOOD PRESSURE: 162 MMHG | RESPIRATION RATE: 16 BRPM | DIASTOLIC BLOOD PRESSURE: 80 MMHG | TEMPERATURE: 98.2 F | BODY MASS INDEX: 30.86 KG/M2 | WEIGHT: 157.2 LBS | OXYGEN SATURATION: 96 %

## 2020-07-16 DIAGNOSIS — M79.18 BUTTOCK PAIN: ICD-10-CM

## 2020-07-16 DIAGNOSIS — K59.00 CONSTIPATION, UNSPECIFIED CONSTIPATION TYPE: ICD-10-CM

## 2020-07-16 DIAGNOSIS — M79.89 SWOLLEN LEG: ICD-10-CM

## 2020-07-16 DIAGNOSIS — M54.16 LUMBAR RADICULOPATHY: Primary | ICD-10-CM

## 2020-07-16 PROCEDURE — 4040F PNEUMOC VAC/ADMIN/RCVD: CPT | Performed by: FAMILY MEDICINE

## 2020-07-16 PROCEDURE — 3008F BODY MASS INDEX DOCD: CPT | Performed by: FAMILY MEDICINE

## 2020-07-16 PROCEDURE — 3079F DIAST BP 80-89 MM HG: CPT | Performed by: FAMILY MEDICINE

## 2020-07-16 PROCEDURE — 2022F DILAT RTA XM EVC RTNOPTHY: CPT | Performed by: FAMILY MEDICINE

## 2020-07-16 PROCEDURE — 3044F HG A1C LEVEL LT 7.0%: CPT | Performed by: FAMILY MEDICINE

## 2020-07-16 PROCEDURE — 3066F NEPHROPATHY DOC TX: CPT | Performed by: FAMILY MEDICINE

## 2020-07-16 PROCEDURE — 3077F SYST BP >= 140 MM HG: CPT | Performed by: FAMILY MEDICINE

## 2020-07-16 PROCEDURE — 1036F TOBACCO NON-USER: CPT | Performed by: FAMILY MEDICINE

## 2020-07-16 PROCEDURE — 99214 OFFICE O/P EST MOD 30 MIN: CPT | Performed by: FAMILY MEDICINE

## 2020-07-16 PROCEDURE — 1160F RVW MEDS BY RX/DR IN RCRD: CPT | Performed by: FAMILY MEDICINE

## 2020-07-16 RX ORDER — PREDNISONE 10 MG/1
TABLET ORAL
Qty: 28 TABLET | Refills: 0 | Status: SHIPPED | OUTPATIENT
Start: 2020-07-16 | End: 2020-12-04 | Stop reason: ALTCHOICE

## 2020-07-16 NOTE — PROGRESS NOTES
Chief Complaint   Patient presents with    Buttocks Pain     Bilateral for couple weeks  Assessment/Plan:    Give prednisone  SE educated pt  Do not take it with ibuprofen/aleve/motrin etc  It may increase blood glucose, use ISS  Refer to PT and pain specialist    Check doppler to r/o DVT  May use compression stocking which may help  Diagnoses and all orders for this visit:    Lumbar radiculopathy  -     XR spine lumbar minimum 4 views non injury; Future  -     predniSONE 10 mg tablet; Take 4 tabs for 4 days, then 3 tabs for 2 days, then 2 tabs for 2 days and then 1 tab for 2 day  -     Ambulatory referral to Physical Therapy; Future  -     Ambulatory referral to Pain Management; Future    Buttock pain  -     Ambulatory referral to Physical Therapy; Future  -     Ambulatory referral to Pain Management; Future    Constipation, unspecified constipation type    Swollen leg  -     VAS lower limb venous duplex study, complete bilateral; Future          Subjective:      Patient ID: Meghann Villatoro is a 81 y o  female  HPI    Pt is here by herself  C/o b/l buttocks pain for 2 weeks  Start since she strained to go to bathroom/constipation  Constant, 10/10  Sharp pain  Worse in the morning  Cannot walk far  Denies fever, urine/BM incontinence  Denies Sob, CP, n/v/abd pain  C/o swollen legs come and go  Worse in the afternoon  Worse in left leg  Sometimes feels "pressure, tightness" of the leg  No swollen today  Denies injury  Denies fever, SOB, CP, n/v/abd pain  The following portions of the patient's history were reviewed and updated as appropriate: allergies, current medications, past family history, past medical history, past social history, past surgical history and problem list     Review of Systems   Constitutional: Negative for appetite change, chills and fever  HENT: Negative for congestion, ear pain, sinus pain and sore throat      Eyes: Negative for discharge and itching  Respiratory: Negative for apnea, cough, chest tightness, shortness of breath and wheezing  Cardiovascular: Negative for chest pain, palpitations and leg swelling  Gastrointestinal: Negative for abdominal pain, anal bleeding, constipation, diarrhea, nausea and vomiting  Endocrine: Negative for cold intolerance, heat intolerance and polyuria  Genitourinary: Negative for difficulty urinating and dysuria  Musculoskeletal: Positive for arthralgias and joint swelling  Negative for back pain and myalgias  Skin: Negative for rash  Neurological: Negative for dizziness and headaches  Psychiatric/Behavioral: Negative for agitation  Objective:      /80 (BP Location: Left arm, Patient Position: Sitting, Cuff Size: Adult)   Pulse 88   Temp 98 2 °F (36 8 °C) (Oral)   Resp 16   Ht 4' 11 75" (1 518 m)   Wt 71 3 kg (157 lb 3 2 oz)   SpO2 96%   BMI 30 96 kg/m²          Physical Exam   Constitutional: She appears well-developed  No distress  HENT:   Head: Normocephalic  Eyes: Conjunctivae are normal  Right eye exhibits no discharge  Left eye exhibits no discharge  Neck: Normal range of motion  No thyromegaly present  Cardiovascular: Normal rate, regular rhythm and normal heart sounds  Exam reveals no gallop and no friction rub  No murmur heard  Pulmonary/Chest: Effort normal and breath sounds normal  No respiratory distress  She has no wheezes  She has no rales  She exhibits no tenderness  Abdominal: Soft  Bowel sounds are normal  She exhibits no distension and no mass  There is no tenderness  There is no rebound and no guarding  Musculoskeletal:   Lower back no swollen or erythema  B/l legs no swollen or tenderness   Lymphadenopathy:     She has no cervical adenopathy  Neurological: She is alert  Psychiatric: She has a normal mood and affect

## 2020-07-17 ENCOUNTER — HOSPITAL ENCOUNTER (OUTPATIENT)
Dept: RADIOLOGY | Facility: HOSPITAL | Age: 85
Discharge: HOME | End: 2020-07-17
Payer: COMMERCIAL

## 2020-07-17 ENCOUNTER — TRANSCRIBE ORDERS (OUTPATIENT)
Dept: RADIOLOGY | Facility: HOSPITAL | Age: 85
End: 2020-07-17

## 2020-07-17 DIAGNOSIS — M54.16 LUMBAR RADICULOPATHY: ICD-10-CM

## 2020-07-17 PROCEDURE — 72110 X-RAY EXAM L-2 SPINE 4/>VWS: CPT

## 2020-07-23 ENCOUNTER — HOSPITAL ENCOUNTER (OUTPATIENT)
Dept: NON INVASIVE DIAGNOSTICS | Facility: HOSPITAL | Age: 85
Discharge: HOME | End: 2020-07-23
Payer: COMMERCIAL

## 2020-07-23 DIAGNOSIS — M79.89 SWOLLEN LEG: ICD-10-CM

## 2020-07-23 PROCEDURE — 93970 EXTREMITY STUDY: CPT | Performed by: SURGERY

## 2020-07-23 PROCEDURE — 93970 EXTREMITY STUDY: CPT

## 2020-08-10 ENCOUNTER — EVALUATION (OUTPATIENT)
Dept: PHYSICAL THERAPY | Facility: REHABILITATION | Age: 85
End: 2020-08-10
Payer: COMMERCIAL

## 2020-08-10 DIAGNOSIS — M54.16 LUMBAR RADICULOPATHY: ICD-10-CM

## 2020-08-10 DIAGNOSIS — M79.18 BUTTOCK PAIN: Primary | ICD-10-CM

## 2020-08-10 PROCEDURE — 97110 THERAPEUTIC EXERCISES: CPT | Performed by: PHYSICAL THERAPIST

## 2020-08-10 PROCEDURE — 97162 PT EVAL MOD COMPLEX 30 MIN: CPT | Performed by: PHYSICAL THERAPIST

## 2020-08-10 NOTE — PROGRESS NOTES
PT Evaluation     Today's date: 8/10/2020  Patient name: Maria Teresa Garber  : 1935  MRN: [id number]  Referring provider: Neyda Malhotra MD  Dx:   Encounter Diagnosis     ICD-10-CM    1  Lumbar radiculopathy  M54 16 Ambulatory referral to Physical Therapy   2  Buttock pain  M79 18 Ambulatory referral to Physical Therapy                  Assessment  Assessment details: Pt is a 81year old female presenting to [facility] outpatient therapy with complaints of bilateral buttocks pain  Pt presents with impaired LE strength, poor posture, tight piriformis and hamstrings bilaterally, and increased pain with activity  Pt has difficulty with grocery shopping and walking greater than 2 blocks without needing to take a rest break  Pt would benefit from skilled PT to address the above impairments to maximize function and improve overall quality of life  Impairments: activity intolerance, impaired physical strength, lacks appropriate home exercise program, pain with function and poor posture     Goals  1  Pt will improve LE strength to 4/5 within 4 weeks  2  Pt will report no pain with getting out of bed in the morning within 4 weeks  3  Pt will report ability to grocery shop without taking a break within 4 weeks  4  Pt will be able to ambulate >4 blocks without taking a break within 4 weeks  5  Pt will improve FOTO score 9 points to a 70/100 within 4 weeks  Plan  Plan details: Pt to be re evaluated in 4 weeks     Patient would benefit from: skilled physical therapy  Planned modality interventions: thermotherapy: hydrocollator packs and cryotherapy  Planned therapy interventions: manual therapy, neuromuscular re-education, patient education, strengthening, stretching, therapeutic activities, therapeutic exercise and home exercise program  Frequency: 1x week  Duration in weeks: 4  Treatment plan discussed with: patient        Subjective Evaluation    History of Present Illness  Mechanism of injury: Pt presents to PT with pain in bilateral buttocks  Pt states she started to experience constipation in beginning of July which she believes put a lot of strain on her "bottom " Pt notes pain is worse in the morning when she gets out of bed and when she has to go to the bathroom in the middle of the night; she tends to stay in a flexed posture to help with symptoms  Pt notes buttock pain radiates down bilateral legs to the outside of her shin  Denies numbness/tingling down legs or into groin  Pt notes occasional bladder dysfunction which has been going on for quite some time- unrelated to her buttock pain  however states she is able to control it  Pt notes she takes Aleve to assist with the pain  Pt notes she was exercising about 3x a week before COVID-19 at the [facility]  Pt states she saw her PCP who referred her to PT  Pt notes she would have to take a rest break after ambulating 2 blocks or in the grocery store  Pt is able to sleep at night with no difficulties  Pain  Current pain ratin  At best pain ratin  At worst pain ratin  Location: ischial tuberosities  Quality: radiating  Relieving factors: medications and change in position  Aggravating factors: lifting  Progression: improved      Diagnostic Tests  X-ray: abnormal (see report )  Treatments  Current treatment: physical therapy  Patient Goals  Patient goals for therapy: decreased pain and increased strength          Objective     Static Posture     Shoulders  Rounded  Palpation   Left   No palpable tenderness to the erector spinae, gluteus medius, lumbar paraspinals and transverse abdominus  Tenderness of the gluteus jose r and piriformis  Right   No palpable tenderness to the erector spinae, gluteus medius, lumbar paraspinals and transverse abdominus  Tenderness of the gluteus jose r and piriformis  Tenderness     Lumbar Spine  No tenderness in the spinous process, left transverse process and right transverse process       Left Hip   Tenderness in the ischial tuberosity  No tenderness in the PSIS  Right Hip   Tenderness in the ischial tuberosity  No tenderness in the PSIS  Active Range of Motion     Lumbar   Flexion:  WFL  Extension: Active lumbar extension: pulling in buttocks bilaterally  WFL  Left lateral flexion: Active left lumbar lateral flexion: pulling in buttocks bilaterally  WFL  Right lateral flexion: Active right lumbar lateral flexion: pulling in buttocks bilaterally  WFL  Left rotation: Active left lumbar rotation: pulling in buttocks bilaterally  WFL  Right rotation: Active right lumbar rotation: pulling in buttocks bilaterally  WFL    Passive Range of Motion   Left Hip   Normal passive range of motion    Right Hip   Normal passive range of motion    Strength/Myotome Testing     Left Hip   Planes of Motion   Flexion: 4  Extension: 3+  Abduction: 4-  External rotation: 4-  Internal rotation: 4-    Right Hip   Planes of Motion   Flexion: 4  Extension: 3+  Abduction: 4-  External rotation: 4-  Internal rotation: 4-    Left Knee   Flexion: 4+  Extension: 5  Quadriceps contraction: good    Right Knee   Flexion: 4+  Extension: 5  Quadriceps contraction: good    Left Ankle/Foot   Dorsiflexion: 5    Right Ankle/Foot   Dorsiflexion: 5    Tests     Left Hip   Positive piriformis  Negative scour  SLR: Negative  Right Hip   Positive piriformis  Negative scour  SLR: Negative       Additional Tests Details  90/90 hamstring length: -40 bilaterally              Precautions: Type II Diabetes, HTN, Osteopenia       Manuals                                                                 Neuro Re-Ed             TrA contraction              SLS                                                                              Ther Ex             Recumbent bike             Piriformis stretch issued HEP            Hamstring stretch              Bridging with TrA             Prone hip extension  issued HEP            SL hip abduction issued HEP Squats              Step ups              Ther Activity                                       Gait Training                                       Modalities             Prn

## 2020-08-18 ENCOUNTER — OFFICE VISIT (OUTPATIENT)
Dept: PHYSICAL THERAPY | Facility: REHABILITATION | Age: 85
End: 2020-08-18
Payer: COMMERCIAL

## 2020-08-18 DIAGNOSIS — M54.16 LUMBAR RADICULOPATHY: ICD-10-CM

## 2020-08-18 DIAGNOSIS — M79.18 BUTTOCK PAIN: Primary | ICD-10-CM

## 2020-08-18 PROCEDURE — 97110 THERAPEUTIC EXERCISES: CPT | Performed by: PHYSICAL THERAPIST

## 2020-08-18 PROCEDURE — 97140 MANUAL THERAPY 1/> REGIONS: CPT | Performed by: PHYSICAL THERAPIST

## 2020-08-18 NOTE — PROGRESS NOTES
Daily Note     Today's date: 2020  Patient name: Boby Burrows  : 1935  MRN: [id number]  Referring provider: Bouchra Marcus MD  Dx:   Encounter Diagnosis     ICD-10-CM    1  Buttock pain  M79 18    2  Lumbar radiculopathy  M54 16                   Subjective: Pt notes no change from IE, notes she still has pain in bilateral buttocks  Pt notes she has been completing her HEP 2x a day  Objective: See treatment diary below           Precautions: Type II Diabetes, HTN, Osteopenia       Manuals 8/10/20 8/18/20           Piriformis stretch B/L  8 min            Hamstring stretch B/L  8 min                                     Neuro Re-Ed             TrA contraction   5"x10           SLS  nv                                                                            Ther Ex             Recumbent bike  5 min                                     Bridging with TrA  3"x12           Prone hip extension  issued HEP 5"x10           SL hip abduction issued HEP 3"x12 ea           Squats   12x           Step ups   1 R, 10x ea            Ther Activity                                       Gait Training                                       Modalities             Prn                             Assessment: Session focused on improving HS and piriformis muscle length and LE strength  Pt able to increase both hamstring and piriformis muscle length post stretching and notes her legs felt less tight  Pt demonstrates good technique with hip abduction, squats and step ups today  Pt required tactile cues to prevent pelvic rotation with hip extension in prone  Plan: Continue treatment as tolerated

## 2020-08-25 ENCOUNTER — OFFICE VISIT (OUTPATIENT)
Dept: PHYSICAL THERAPY | Facility: REHABILITATION | Age: 85
End: 2020-08-25
Payer: COMMERCIAL

## 2020-08-25 DIAGNOSIS — M79.18 BUTTOCK PAIN: Primary | ICD-10-CM

## 2020-08-25 DIAGNOSIS — M54.16 LUMBAR RADICULOPATHY: ICD-10-CM

## 2020-08-25 PROCEDURE — 97110 THERAPEUTIC EXERCISES: CPT | Performed by: PHYSICAL THERAPIST

## 2020-08-25 NOTE — PROGRESS NOTES
Daily Note     Today's date: 2020  Patient name: Willian Villanueva  : 1935  MRN: [id number]  Referring provider: Carly Dave MD  Dx:   Encounter Diagnosis     ICD-10-CM    1  Buttock pain  M79 18    2  Lumbar radiculopathy  M54 16                   Subjective: Pt notes her pain level in the morning is 8/10, currently 5/10 in her in bilateral buttocks  Pt notes pain is worse in morning when going from supine to standing and notes increase in pain with being on her feet for long periods of time  Objective: See treatment diary below           Precautions: Type II Diabetes, HTN, Osteopenia       Manuals 8/10/20 8/18/20 8/25/20          Piriformis stretch B/L  8 min  np          Hamstring stretch B/L  8 min np          P-A grade III thoracolumbar mobs    10 min                       Neuro Re-Ed             TrA contraction   5"x10 np          SLS  nv np                                                                           Ther Ex             Recumbent bike  5 min 5 min                                     Bridging with TrA  3"x12 np          Prone hip extension  issued HEP 5"x10 5"x10          SL hip abduction issued HEP 3"x12 ea np          Squats   12x 6x ! Step ups   1 R, 10x ea  np          MYRON   5"x10          Sitting in chair with bolster lumbar ext   5"x10 P! hold         Standing lumbar ext   NV          Physio ball rollouts   3"x12          DKTC   HEP          Seated table slides   HEP                       Ther Activity                                       Gait Training                                       Modalities             MHP   deferred                          Assessment: Session focused on improving bilateral buttock pain  Pt presents with hypomobile thoracolumbar spine as noted with P-A mobs  Pt's buttock pain increased with seated lumbar extension with bolster and was relieved with physioball rollouts, no recreation of pain with MYRON   Attempted squats after pain was increased and pt was unable to tolerate  Attempt NV  Pt educated on performing flexion based exercises when pain increases with increased extension  Plan: Continue treatment as tolerated  Complete MYRON and standing ext to improve lumbar mobility within pain free range, utilize flexion based exercises to improve pain as needed

## 2020-08-27 DIAGNOSIS — M79.604 PAIN IN BOTH LOWER EXTREMITIES: Primary | ICD-10-CM

## 2020-08-27 DIAGNOSIS — M79.605 PAIN IN BOTH LOWER EXTREMITIES: Primary | ICD-10-CM

## 2020-09-01 ENCOUNTER — OFFICE VISIT (OUTPATIENT)
Dept: PHYSICAL THERAPY | Facility: REHABILITATION | Age: 85
End: 2020-09-01
Payer: COMMERCIAL

## 2020-09-01 DIAGNOSIS — M79.18 BUTTOCK PAIN: Primary | ICD-10-CM

## 2020-09-01 DIAGNOSIS — M54.16 LUMBAR RADICULOPATHY: ICD-10-CM

## 2020-09-01 PROCEDURE — 97140 MANUAL THERAPY 1/> REGIONS: CPT | Performed by: PHYSICAL THERAPIST

## 2020-09-01 PROCEDURE — 97110 THERAPEUTIC EXERCISES: CPT | Performed by: PHYSICAL THERAPIST

## 2020-09-01 NOTE — PROGRESS NOTES
Daily Note     Today's date: 2020  Patient name: Willian Villanueva  : 1935  MRN: [id number]  Referring provider: Carly Dave MD  Dx:   Encounter Diagnosis     ICD-10-CM    1  Buttock pain  M79 18    2  Lumbar radiculopathy  M54 16                   Subjective: Pt notes cont to have good and bad days  Pt cont to c/o increase in buttock pain, equal bilat, initially in AM and typically decreases throughout the day with activity  Pt cont to be limited with prolonged standing/walking in which she finds herself slowly stooping forward  Objective: See treatment diary below       Precautions: Type II Diabetes, HTN, Osteopenia       Manuals 8/10/20 8/18/20 8/25/20 9/1/20     Piriformis stretch B/L  8 min  np 5 mins     Hamstring stretch B/L  8 min np 5 mins    Sacral rocking   10 min 5 mins    LAD    5 mins     Neuro Re-Ed        TrA contraction   5"x10 np     SLS  nv np                                             Ther Ex        Recumbent bike  5 min 5 min  7 mins                     Bridging with TrA  3"x12 np     Prone hip extension  issued HEP 5"x10 5"x10 Flex at table 3"x10 ea    SL hip abduction issued HEP 3"x12 ea np     Squats   12x 6x ! Step ups   1 R, 10x ea  np     MYRON   5"x10     Sitting in chair with bolster lumbar ext   5"x10 P! hold    Standing lumbar ext   NV     Physio ball rollouts   3"x12 5"x15    DKTC   HEP     Seated table slides   HEP             Ther Activity                        Gait Training                        Modalities        MHP   deferred     Traction static    55# 5 mins static/10 mins total       Assessment: Pt cont to do well with lumbar flexion compared with standing or extension  Performed manual mobs to assist with lumbar and sacral flexibility, attempting LAD with good relief to determine benefit of txn  Performed lumbar flexion ex again to assist with sx's  Pt cont to c/o pain into bilat buttock and added traction with min relief  Will assess tolerance at NV  Plan: DANDRE potter

## 2020-09-08 ENCOUNTER — EVALUATION (OUTPATIENT)
Dept: PHYSICAL THERAPY | Facility: REHABILITATION | Age: 85
End: 2020-09-08
Payer: COMMERCIAL

## 2020-09-08 DIAGNOSIS — M54.16 LUMBAR RADICULOPATHY: ICD-10-CM

## 2020-09-08 DIAGNOSIS — M79.18 BUTTOCK PAIN: Primary | ICD-10-CM

## 2020-09-08 PROCEDURE — 97140 MANUAL THERAPY 1/> REGIONS: CPT | Performed by: PHYSICAL THERAPIST

## 2020-09-08 NOTE — PROGRESS NOTES
PT Re-Evaluation  and PT Discharge    Today's date: 2020  Patient name: Elba Heath  : 1935  MRN: [id number]  Referring provider: Isidro Gutierrez MD  Dx:   Encounter Diagnosis     ICD-10-CM    1  Buttock pain  M79 18    2  Lumbar radiculopathy  M54 16                   Assessment  Assessment details: Pt has reached max level of progress with PT in regards to strengthening, however cont to have high levels of pain at times without ability to centralize during exercises, HEP, traction, and mobilizations  Pt would benefit from further diagnostic testing or referral to pain management as was already recommended by PCP  Pt is DC'd from PT at this time  Goals  1  Pt will improve LE strength to 4/5 within 4 weeks  -met  2  Pt will report no pain with getting out of bed in the morning within 4 weeks  -not met  3  Pt will report ability to grocery shop without taking a break within 4 weeks  -not met  4  Pt will be able to ambulate >4 blocks without taking a break within 4 weeks  -not met  5  Pt will improve FOTO score 9 points to a 70/100 within 4 weeks  -not met        Subjective Evaluation    History of Present Illness  Mechanism of injury: Pt presents to PT with cont c/o bilat buttock pain and sx's  Pt notes that she feels no progress in regards to pain since beginning, however does note improvement in strength bilat LE  Pt c/o worst pain in AM when getting OOB or during forward flexion after prolonged standing activities (grocery shopping or preparing meal)  Pt cont to note relief with sitting and denies waking from pain and sx's  Pt does note she attempted to be seen by pain management, however was unhappy with office staff upon making appt and wishes to speak to PCP regarding referral   Pt has shown no FOTO gain with treatment     Pain  Current pain ratin  At best pain ratin  At worst pain ratin  Location: ischial tuberosities  Quality: radiating  Relieving factors: medications and change in position  Aggravating factors: lifting  Progression: improved      Diagnostic Tests  X-ray: abnormal (see report )  Treatments  Current treatment: physical therapy  Patient Goals  Patient goals for therapy: decreased pain and increased strength  Patient goal: strength met, pain not met        Objective     Palpation   Left   No palpable tenderness to the erector spinae, gluteus medius, lumbar paraspinals and transverse abdominus  Tenderness of the gluteus jose r and piriformis  Right   No palpable tenderness to the erector spinae, gluteus medius, lumbar paraspinals and transverse abdominus  Tenderness of the gluteus jose r and piriformis  Tenderness     Lumbar Spine  No tenderness in the spinous process, left transverse process and right transverse process  Left Hip   Tenderness in the ischial tuberosity  No tenderness in the PSIS  Right Hip   Tenderness in the ischial tuberosity  No tenderness in the PSIS  Active Range of Motion     Lumbar   Flexion:  WFL  Extension: Active lumbar extension: pulling in buttocks bilaterally  WFL  Left lateral flexion: Active left lumbar lateral flexion: pulling in buttocks bilaterally  WFL  Right lateral flexion: Active right lumbar lateral flexion: pulling in buttocks bilaterally  WFL  Left rotation: Active left lumbar rotation: pulling in buttocks bilaterally  WFL  Right rotation: Active right lumbar rotation: pulling in buttocks bilaterally  [facility]    Additional Active Range of Motion Details  Good ROM in all directions, however maranda's increase in buttocks pain and concordant sx's with repeated lumbar extension in which she then needs to sit       Passive Range of Motion   Left Hip   Normal passive range of motion    Right Hip   Normal passive range of motion    Strength/Myotome Testing     Left Hip   Planes of Motion   Flexion: 5  Extension: 4+  Abduction: 4+  External rotation: 4+  Internal rotation: 4+    Right Hip   Planes of Motion   Flexion: 5  Extension: 4+  Abduction: 4+  External rotation: 4+  Internal rotation: 4+    Left Knee   Flexion: 5  Extension: 5  Quadriceps contraction: good    Right Knee   Flexion: 5  Extension: 5  Quadriceps contraction: good    Left Ankle/Foot   Dorsiflexion: 5    Right Ankle/Foot   Dorsiflexion: 5    Additional Strength Details  No reproducible pain with resisted testing  Tests     Left Hip   Negative piriformis and scour  SLR: Negative  Right Hip   Negative piriformis and scour  SLR: Negative  Additional Tests Details  Normal HS length present bilat without reproducible sx's  RE x30 mins

## 2020-09-09 ENCOUNTER — TELEPHONE (OUTPATIENT)
Dept: FAMILY MEDICINE CLINIC | Facility: CLINIC | Age: 85
End: 2020-09-09

## 2020-09-09 DIAGNOSIS — M79.605 PAIN IN BOTH LOWER EXTREMITIES: ICD-10-CM

## 2020-09-09 DIAGNOSIS — M79.604 PAIN IN BOTH LOWER EXTREMITIES: ICD-10-CM

## 2020-09-09 DIAGNOSIS — M54.16 LUMBAR RADICULOPATHY: Primary | ICD-10-CM

## 2020-09-09 DIAGNOSIS — M48.00 SPINAL STENOSIS, UNSPECIFIED SPINAL REGION: Primary | ICD-10-CM

## 2020-09-09 NOTE — TELEPHONE ENCOUNTER
Patient called to request if her referral that she had for pain management can be changed for Dr Rachel Andersen

## 2020-09-16 DIAGNOSIS — E11.65 TYPE 2 DIABETES MELLITUS WITH HYPERGLYCEMIA, WITH LONG-TERM CURRENT USE OF INSULIN (HCC): ICD-10-CM

## 2020-09-16 DIAGNOSIS — E11.65 UNCONTROLLED TYPE 2 DIABETES MELLITUS WITH HYPERGLYCEMIA (HCC): ICD-10-CM

## 2020-09-16 DIAGNOSIS — Z79.4 TYPE 2 DIABETES MELLITUS WITH HYPERGLYCEMIA, WITH LONG-TERM CURRENT USE OF INSULIN (HCC): ICD-10-CM

## 2020-09-16 RX ORDER — BLOOD SUGAR DIAGNOSTIC
1 STRIP MISCELLANEOUS 3 TIMES DAILY
Qty: 300 EACH | Refills: 1 | Status: SHIPPED | OUTPATIENT
Start: 2020-09-16 | End: 2021-05-04

## 2020-09-16 NOTE — TELEPHONE ENCOUNTER
[employer]et[employer] lancets: The medication you are trying to reorder is no longer available  Please select one of the alternatives below      Needs to be refilled also

## 2020-09-17 RX ORDER — LANCETS 33 GAUGE
EACH MISCELLANEOUS 3 TIMES DAILY
Qty: 300 EACH | Refills: 3 | Status: SHIPPED | OUTPATIENT
Start: 2020-09-17

## 2020-09-17 NOTE — TELEPHONE ENCOUNTER
Called [facility] spoke with pharmacist checked if Dorina Rosales is covered confirmed it is

## 2020-10-02 ENCOUNTER — CONSULT (OUTPATIENT)
Dept: PAIN MEDICINE | Facility: MEDICAL CENTER | Age: 85
End: 2020-10-02
Payer: COMMERCIAL

## 2020-10-02 VITALS
HEIGHT: 61 IN | HEART RATE: 90 BPM | SYSTOLIC BLOOD PRESSURE: 196 MMHG | WEIGHT: 158 LBS | TEMPERATURE: 98.2 F | BODY MASS INDEX: 29.83 KG/M2 | DIASTOLIC BLOOD PRESSURE: 61 MMHG

## 2020-10-02 DIAGNOSIS — M54.16 LUMBAR RADICULOPATHY: ICD-10-CM

## 2020-10-02 DIAGNOSIS — M79.605 PAIN IN BOTH LOWER EXTREMITIES: ICD-10-CM

## 2020-10-02 DIAGNOSIS — M79.604 PAIN IN BOTH LOWER EXTREMITIES: ICD-10-CM

## 2020-10-02 DIAGNOSIS — M48.062 SPINAL STENOSIS OF LUMBAR REGION WITH NEUROGENIC CLAUDICATION: Primary | ICD-10-CM

## 2020-10-02 PROCEDURE — 99204 OFFICE O/P NEW MOD 45 MIN: CPT | Performed by: PHYSICAL MEDICINE & REHABILITATION

## 2020-10-02 PROCEDURE — 3078F DIAST BP <80 MM HG: CPT | Performed by: PHYSICAL MEDICINE & REHABILITATION

## 2020-10-02 PROCEDURE — 1036F TOBACCO NON-USER: CPT | Performed by: PHYSICAL MEDICINE & REHABILITATION

## 2020-10-13 ENCOUNTER — HOSPITAL ENCOUNTER (OUTPATIENT)
Dept: RADIOLOGY | Facility: HOSPITAL | Age: 85
Discharge: HOME | End: 2020-10-13
Attending: PHYSICAL MEDICINE & REHABILITATION
Payer: COMMERCIAL

## 2020-10-13 DIAGNOSIS — M79.604 PAIN IN BOTH LOWER EXTREMITIES: ICD-10-CM

## 2020-10-13 DIAGNOSIS — M48.062 SPINAL STENOSIS OF LUMBAR REGION WITH NEUROGENIC CLAUDICATION: ICD-10-CM

## 2020-10-13 DIAGNOSIS — M79.605 PAIN IN BOTH LOWER EXTREMITIES: ICD-10-CM

## 2020-10-13 DIAGNOSIS — M54.16 LUMBAR RADICULOPATHY: ICD-10-CM

## 2020-10-13 PROCEDURE — 72148 MRI LUMBAR SPINE W/O DYE: CPT

## 2020-10-13 PROCEDURE — G1004 CDSM NDSC: HCPCS

## 2020-10-16 ENCOUNTER — TELEPHONE (OUTPATIENT)
Dept: PAIN MEDICINE | Facility: MEDICAL CENTER | Age: 85
End: 2020-10-16

## 2020-10-19 ENCOUNTER — TELEPHONE (OUTPATIENT)
Dept: PAIN MEDICINE | Facility: MEDICAL CENTER | Age: 85
End: 2020-10-19

## 2020-10-19 DIAGNOSIS — H47.029: Primary | ICD-10-CM

## 2020-10-23 ENCOUNTER — HOSPITAL ENCOUNTER (OUTPATIENT)
Dept: NON INVASIVE DIAGNOSTICS | Facility: HOSPITAL | Age: 85
Discharge: HOME | End: 2020-10-23
Payer: COMMERCIAL

## 2020-10-23 DIAGNOSIS — H47.029: ICD-10-CM

## 2020-10-23 PROCEDURE — 93306 TTE W/DOPPLER COMPLETE: CPT | Performed by: INTERNAL MEDICINE

## 2020-10-23 PROCEDURE — 93306 TTE W/DOPPLER COMPLETE: CPT

## 2020-10-26 ENCOUNTER — HOSPITAL ENCOUNTER (OUTPATIENT)
Dept: NON INVASIVE DIAGNOSTICS | Facility: CLINIC | Age: 85
Discharge: HOME | End: 2020-10-26
Payer: COMMERCIAL

## 2020-10-26 DIAGNOSIS — H47.029: ICD-10-CM

## 2020-10-26 PROCEDURE — 93880 EXTRACRANIAL BILAT STUDY: CPT

## 2020-10-26 PROCEDURE — 93880 EXTRACRANIAL BILAT STUDY: CPT | Performed by: SURGERY

## 2020-10-27 ENCOUNTER — TELEPHONE (OUTPATIENT)
Dept: FAMILY MEDICINE CLINIC | Facility: CLINIC | Age: 85
End: 2020-10-27

## 2020-10-27 ENCOUNTER — HOSPITAL ENCOUNTER (OUTPATIENT)
Dept: RADIOLOGY | Facility: MEDICAL CENTER | Age: 85
Discharge: HOME | End: 2020-10-27
Attending: PHYSICAL MEDICINE & REHABILITATION | Admitting: PHYSICAL MEDICINE & REHABILITATION
Payer: COMMERCIAL

## 2020-10-27 VITALS
OXYGEN SATURATION: 98 % | DIASTOLIC BLOOD PRESSURE: 75 MMHG | RESPIRATION RATE: 18 BRPM | HEART RATE: 85 BPM | TEMPERATURE: 98.5 F | SYSTOLIC BLOOD PRESSURE: 167 MMHG

## 2020-10-27 DIAGNOSIS — M48.061 LUMBAR FORAMINAL STENOSIS: ICD-10-CM

## 2020-10-27 PROCEDURE — 64483 NJX AA&/STRD TFRM EPI L/S 1: CPT | Performed by: PHYSICAL MEDICINE & REHABILITATION

## 2020-10-27 RX ORDER — PAPAVERINE HCL 150 MG
20 CAPSULE, EXTENDED RELEASE ORAL ONCE
Status: COMPLETED | OUTPATIENT
Start: 2020-10-27 | End: 2020-10-27

## 2020-10-27 RX ORDER — LIDOCAINE HYDROCHLORIDE 10 MG/ML
5 INJECTION, SOLUTION EPIDURAL; INFILTRATION; INTRACAUDAL; PERINEURAL ONCE
Status: COMPLETED | OUTPATIENT
Start: 2020-10-27 | End: 2020-10-27

## 2020-10-27 RX ORDER — ASPIRIN 81 MG/1
81 TABLET, CHEWABLE ORAL DAILY
COMMUNITY

## 2020-10-27 RX ADMIN — IOHEXOL 2 ML: 300 INJECTION, SOLUTION INTRAVENOUS at 13:14

## 2020-10-27 RX ADMIN — LIDOCAINE HYDROCHLORIDE 2.5 ML: 10 INJECTION, SOLUTION EPIDURAL; INFILTRATION; INTRACAUDAL; PERINEURAL at 13:13

## 2020-10-27 RX ADMIN — DEXAMETHASONE SODIUM PHOSPHATE 15 MG: 10 INJECTION, SOLUTION INTRAMUSCULAR; INTRAVENOUS at 13:15

## 2020-11-03 ENCOUNTER — TELEPHONE (OUTPATIENT)
Dept: PAIN MEDICINE | Facility: CLINIC | Age: 85
End: 2020-11-03

## 2020-11-19 ENCOUNTER — LAB (OUTPATIENT)
Dept: LAB | Facility: CLINIC | Age: 85
End: 2020-11-19
Payer: COMMERCIAL

## 2020-11-19 ENCOUNTER — TRANSCRIBE ORDERS (OUTPATIENT)
Dept: LAB | Facility: CLINIC | Age: 85
End: 2020-11-19

## 2020-11-19 DIAGNOSIS — N18.6 TYPE 2 DIABETES MELLITUS WITH ESRD (END-STAGE RENAL DISEASE) (HCC): Primary | ICD-10-CM

## 2020-11-19 DIAGNOSIS — Z79.4 TYPE 2 DIABETES MELLITUS WITH STAGE 3 CHRONIC KIDNEY DISEASE, WITH LONG-TERM CURRENT USE OF INSULIN (HCC): ICD-10-CM

## 2020-11-19 DIAGNOSIS — E78.2 MIXED HYPERLIPIDEMIA: ICD-10-CM

## 2020-11-19 DIAGNOSIS — I10 ESSENTIAL HYPERTENSION, MALIGNANT: ICD-10-CM

## 2020-11-19 DIAGNOSIS — E11.22 TYPE 2 DIABETES MELLITUS WITH STAGE 3 CHRONIC KIDNEY DISEASE, WITH LONG-TERM CURRENT USE OF INSULIN (HCC): ICD-10-CM

## 2020-11-19 DIAGNOSIS — Z79.4 ENCOUNTER FOR LONG-TERM (CURRENT) USE OF INSULIN (HCC): ICD-10-CM

## 2020-11-19 DIAGNOSIS — N18.30 TYPE 2 DIABETES MELLITUS WITH STAGE 3 CHRONIC KIDNEY DISEASE, WITH LONG-TERM CURRENT USE OF INSULIN (HCC): ICD-10-CM

## 2020-11-19 DIAGNOSIS — I10 ESSENTIAL HYPERTENSION: ICD-10-CM

## 2020-11-19 DIAGNOSIS — E78.5 HYPERLIPIDEMIA, UNSPECIFIED HYPERLIPIDEMIA TYPE: ICD-10-CM

## 2020-11-19 DIAGNOSIS — E11.22 TYPE 2 DIABETES MELLITUS WITH ESRD (END-STAGE RENAL DISEASE) (HCC): ICD-10-CM

## 2020-11-19 DIAGNOSIS — E11.22 TYPE 2 DIABETES MELLITUS WITH ESRD (END-STAGE RENAL DISEASE) (HCC): Primary | ICD-10-CM

## 2020-11-19 DIAGNOSIS — N18.6 TYPE 2 DIABETES MELLITUS WITH ESRD (END-STAGE RENAL DISEASE) (HCC): ICD-10-CM

## 2020-11-19 DIAGNOSIS — N18.31 STAGE 3A CHRONIC KIDNEY DISEASE (HCC): ICD-10-CM

## 2020-11-19 LAB
ALBUMIN SERPL BCP-MCNC: 3.9 G/DL (ref 3.5–5)
ALP SERPL-CCNC: 97 U/L (ref 46–116)
ALT SERPL W P-5'-P-CCNC: 22 U/L (ref 12–78)
ANION GAP SERPL CALCULATED.3IONS-SCNC: 5 MMOL/L (ref 4–13)
AST SERPL W P-5'-P-CCNC: 18 U/L (ref 5–45)
BILIRUB SERPL-MCNC: 0.61 MG/DL (ref 0.2–1)
BUN SERPL-MCNC: 17 MG/DL (ref 5–25)
CALCIUM SERPL-MCNC: 10.5 MG/DL (ref 8.3–10.1)
CHLORIDE SERPL-SCNC: 108 MMOL/L (ref 100–108)
CHOLEST SERPL-MCNC: 193 MG/DL (ref 50–200)
CO2 SERPL-SCNC: 28 MMOL/L (ref 21–32)
CREAT SERPL-MCNC: 1.03 MG/DL (ref 0.6–1.3)
CREAT UR-MCNC: 143 MG/DL
EST. AVERAGE GLUCOSE BLD GHB EST-MCNC: 163 MG/DL
GFR SERPL CREATININE-BSD FRML MDRD: 50 ML/MIN/1.73SQ M
GLUCOSE P FAST SERPL-MCNC: 155 MG/DL (ref 65–99)
HBA1C MFR BLD: 7.3 %
HDLC SERPL-MCNC: 48 MG/DL
LDLC SERPL CALC-MCNC: 103 MG/DL (ref 0–100)
MICROALBUMIN UR-MCNC: 12.9 MG/L (ref 0–20)
MICROALBUMIN/CREAT 24H UR: 9 MG/G CREATININE (ref 0–30)
NONHDLC SERPL-MCNC: 145 MG/DL
POTASSIUM SERPL-SCNC: 4.5 MMOL/L (ref 3.5–5.3)
PROT SERPL-MCNC: 7.3 G/DL (ref 6.4–8.2)
SODIUM SERPL-SCNC: 141 MMOL/L (ref 136–145)
TRIGL SERPL-MCNC: 212 MG/DL
TSH SERPL DL<=0.05 MIU/L-ACNC: 3.04 UIU/ML (ref 0.36–3.74)

## 2020-11-19 PROCEDURE — 82043 UR ALBUMIN QUANTITATIVE: CPT

## 2020-11-19 PROCEDURE — 80061 LIPID PANEL: CPT

## 2020-11-19 PROCEDURE — 36415 COLL VENOUS BLD VENIPUNCTURE: CPT

## 2020-11-19 PROCEDURE — 84443 ASSAY THYROID STIM HORMONE: CPT

## 2020-11-19 PROCEDURE — 80053 COMPREHEN METABOLIC PANEL: CPT

## 2020-11-19 PROCEDURE — 82570 ASSAY OF URINE CREATININE: CPT

## 2020-11-19 PROCEDURE — 83036 HEMOGLOBIN GLYCOSYLATED A1C: CPT

## 2020-11-23 ENCOUNTER — OFFICE VISIT (OUTPATIENT)
Dept: PAIN MEDICINE | Facility: MEDICAL CENTER | Age: 85
End: 2020-11-23
Payer: COMMERCIAL

## 2020-11-23 VITALS
WEIGHT: 156.6 LBS | DIASTOLIC BLOOD PRESSURE: 67 MMHG | HEIGHT: 61 IN | BODY MASS INDEX: 29.57 KG/M2 | SYSTOLIC BLOOD PRESSURE: 141 MMHG | HEART RATE: 99 BPM | TEMPERATURE: 97.7 F

## 2020-11-23 DIAGNOSIS — M48.061 LUMBAR FORAMINAL STENOSIS: Primary | ICD-10-CM

## 2020-11-23 PROCEDURE — 1160F RVW MEDS BY RX/DR IN RCRD: CPT | Performed by: NURSE PRACTITIONER

## 2020-11-23 PROCEDURE — 3078F DIAST BP <80 MM HG: CPT | Performed by: NURSE PRACTITIONER

## 2020-11-23 PROCEDURE — 1036F TOBACCO NON-USER: CPT | Performed by: NURSE PRACTITIONER

## 2020-11-23 PROCEDURE — 3077F SYST BP >= 140 MM HG: CPT | Performed by: NURSE PRACTITIONER

## 2020-11-23 PROCEDURE — 99213 OFFICE O/P EST LOW 20 MIN: CPT | Performed by: NURSE PRACTITIONER

## 2020-12-04 ENCOUNTER — OFFICE VISIT (OUTPATIENT)
Dept: FAMILY MEDICINE CLINIC | Facility: CLINIC | Age: 85
End: 2020-12-04
Payer: COMMERCIAL

## 2020-12-04 VITALS
SYSTOLIC BLOOD PRESSURE: 148 MMHG | OXYGEN SATURATION: 98 % | WEIGHT: 155.6 LBS | DIASTOLIC BLOOD PRESSURE: 72 MMHG | HEART RATE: 76 BPM | TEMPERATURE: 97.4 F | HEIGHT: 60 IN | RESPIRATION RATE: 16 BRPM | BODY MASS INDEX: 30.55 KG/M2

## 2020-12-04 DIAGNOSIS — I10 ESSENTIAL HYPERTENSION: ICD-10-CM

## 2020-12-04 DIAGNOSIS — N18.31 TYPE 2 DIABETES MELLITUS WITH STAGE 3A CHRONIC KIDNEY DISEASE, WITH LONG-TERM CURRENT USE OF INSULIN (HCC): ICD-10-CM

## 2020-12-04 DIAGNOSIS — Z79.4 TYPE 2 DIABETES MELLITUS WITH STAGE 3A CHRONIC KIDNEY DISEASE, WITH LONG-TERM CURRENT USE OF INSULIN (HCC): ICD-10-CM

## 2020-12-04 DIAGNOSIS — E78.5 HYPERLIPIDEMIA, UNSPECIFIED HYPERLIPIDEMIA TYPE: ICD-10-CM

## 2020-12-04 DIAGNOSIS — E11.65 UNCONTROLLED TYPE 2 DIABETES MELLITUS WITH HYPERGLYCEMIA (HCC): ICD-10-CM

## 2020-12-04 DIAGNOSIS — E83.52 HYPERCALCEMIA: Primary | ICD-10-CM

## 2020-12-04 DIAGNOSIS — E11.22 TYPE 2 DIABETES MELLITUS WITH STAGE 3A CHRONIC KIDNEY DISEASE, WITH LONG-TERM CURRENT USE OF INSULIN (HCC): ICD-10-CM

## 2020-12-04 DIAGNOSIS — Z00.00 MEDICARE ANNUAL WELLNESS VISIT, SUBSEQUENT: ICD-10-CM

## 2020-12-04 PROCEDURE — 1125F AMNT PAIN NOTED PAIN PRSNT: CPT | Performed by: FAMILY MEDICINE

## 2020-12-04 PROCEDURE — 1036F TOBACCO NON-USER: CPT | Performed by: FAMILY MEDICINE

## 2020-12-04 PROCEDURE — 99214 OFFICE O/P EST MOD 30 MIN: CPT | Performed by: FAMILY MEDICINE

## 2020-12-04 PROCEDURE — 3078F DIAST BP <80 MM HG: CPT | Performed by: FAMILY MEDICINE

## 2020-12-04 PROCEDURE — G0439 PPPS, SUBSEQ VISIT: HCPCS | Performed by: FAMILY MEDICINE

## 2020-12-04 PROCEDURE — 3288F FALL RISK ASSESSMENT DOCD: CPT | Performed by: FAMILY MEDICINE

## 2020-12-04 PROCEDURE — 1101F PT FALLS ASSESS-DOCD LE1/YR: CPT | Performed by: FAMILY MEDICINE

## 2020-12-04 PROCEDURE — 3725F SCREEN DEPRESSION PERFORMED: CPT | Performed by: FAMILY MEDICINE

## 2020-12-04 PROCEDURE — 1160F RVW MEDS BY RX/DR IN RCRD: CPT | Performed by: FAMILY MEDICINE

## 2020-12-04 PROCEDURE — 1170F FXNL STATUS ASSESSED: CPT | Performed by: FAMILY MEDICINE

## 2020-12-04 PROCEDURE — 3077F SYST BP >= 140 MM HG: CPT | Performed by: FAMILY MEDICINE

## 2020-12-04 RX ORDER — PEN NEEDLE, DIABETIC 31 GX5/16"
NEEDLE, DISPOSABLE MISCELLANEOUS 4 TIMES DAILY
Qty: 400 EACH | Refills: 3 | Status: SHIPPED | OUTPATIENT
Start: 2020-12-04 | End: 2021-06-08 | Stop reason: ALTCHOICE

## 2020-12-31 ENCOUNTER — LAB (OUTPATIENT)
Dept: LAB | Facility: CLINIC | Age: 85
End: 2020-12-31
Payer: COMMERCIAL

## 2020-12-31 DIAGNOSIS — E83.52 HYPERCALCEMIA: ICD-10-CM

## 2020-12-31 LAB
25(OH)D3 SERPL-MCNC: 53.5 NG/ML (ref 30–100)
CALCIUM SERPL-MCNC: 10 MG/DL (ref 8.3–10.1)
PTH-INTACT SERPL-MCNC: 60.3 PG/ML (ref 18.4–80.1)

## 2020-12-31 PROCEDURE — 82310 ASSAY OF CALCIUM: CPT

## 2020-12-31 PROCEDURE — 36415 COLL VENOUS BLD VENIPUNCTURE: CPT

## 2020-12-31 PROCEDURE — 83970 ASSAY OF PARATHORMONE: CPT

## 2020-12-31 PROCEDURE — 82306 VITAMIN D 25 HYDROXY: CPT

## 2021-01-13 DIAGNOSIS — IMO0002 UNCONTROLLED TYPE 2 DIABETES MELLITUS WITH COMPLICATION, WITH LONG-TERM CURRENT USE OF INSULIN: ICD-10-CM

## 2021-01-13 RX ORDER — INSULIN ASPART 100 [IU]/ML
INJECTION, SOLUTION INTRAVENOUS; SUBCUTANEOUS
Qty: 30 PEN | Refills: 1 | Status: SHIPPED | OUTPATIENT
Start: 2021-01-13

## 2021-01-20 DIAGNOSIS — Z23 ENCOUNTER FOR IMMUNIZATION: ICD-10-CM

## 2021-01-28 ENCOUNTER — IMMUNIZATIONS (OUTPATIENT)
Dept: FAMILY MEDICINE CLINIC | Facility: HOSPITAL | Age: 86
End: 2021-01-28

## 2021-01-28 DIAGNOSIS — Z23 ENCOUNTER FOR IMMUNIZATION: Primary | ICD-10-CM

## 2021-01-28 PROCEDURE — 91300 SARS-COV-2 / COVID-19 MRNA VACCINE (PFIZER-BIONTECH) 30 MCG: CPT

## 2021-01-28 PROCEDURE — 0001A SARS-COV-2 / COVID-19 MRNA VACCINE (PFIZER-BIONTECH) 30 MCG: CPT

## 2021-02-03 DIAGNOSIS — I10 HYPERTENSION, UNSPECIFIED TYPE: ICD-10-CM

## 2021-02-03 RX ORDER — LOSARTAN POTASSIUM 50 MG/1
TABLET ORAL
Qty: 90 TABLET | Refills: 3 | Status: SHIPPED | OUTPATIENT
Start: 2021-02-03 | End: 2022-01-26

## 2021-02-17 ENCOUNTER — IMMUNIZATIONS (OUTPATIENT)
Dept: FAMILY MEDICINE CLINIC | Facility: HOSPITAL | Age: 86
End: 2021-02-17

## 2021-02-17 DIAGNOSIS — Z23 ENCOUNTER FOR IMMUNIZATION: Primary | ICD-10-CM

## 2021-02-17 PROCEDURE — 91300 SARS-COV-2 / COVID-19 MRNA VACCINE (PFIZER-BIONTECH) 30 MCG: CPT

## 2021-02-17 PROCEDURE — 0002A SARS-COV-2 / COVID-19 MRNA VACCINE (PFIZER-BIONTECH) 30 MCG: CPT

## 2021-03-09 ENCOUNTER — TELEPHONE (OUTPATIENT)
Dept: FAMILY MEDICINE CLINIC | Facility: CLINIC | Age: 86
End: 2021-03-09

## 2021-03-09 NOTE — TELEPHONE ENCOUNTER
Patient's eye MD, Luli Kidd is requesting we fax the results of her VAS Carotid study to his office at [phone]

## 2021-05-04 DIAGNOSIS — E11.22 TYPE 2 DIABETES MELLITUS WITH STAGE 3 CHRONIC KIDNEY DISEASE, WITH LONG-TERM CURRENT USE OF INSULIN (HCC): ICD-10-CM

## 2021-05-04 DIAGNOSIS — Z79.4 TYPE 2 DIABETES MELLITUS WITH STAGE 3 CHRONIC KIDNEY DISEASE, WITH LONG-TERM CURRENT USE OF INSULIN (HCC): ICD-10-CM

## 2021-05-04 DIAGNOSIS — N18.30 TYPE 2 DIABETES MELLITUS WITH STAGE 3 CHRONIC KIDNEY DISEASE, WITH LONG-TERM CURRENT USE OF INSULIN (HCC): ICD-10-CM

## 2021-05-04 DIAGNOSIS — E11.65 TYPE 2 DIABETES MELLITUS WITH HYPERGLYCEMIA, WITH LONG-TERM CURRENT USE OF INSULIN (HCC): ICD-10-CM

## 2021-05-04 DIAGNOSIS — Z79.4 TYPE 2 DIABETES MELLITUS WITH HYPERGLYCEMIA, WITH LONG-TERM CURRENT USE OF INSULIN (HCC): ICD-10-CM

## 2021-05-04 RX ORDER — BLOOD SUGAR DIAGNOSTIC
STRIP MISCELLANEOUS
Qty: 300 STRIP | Refills: 1 | Status: SHIPPED | OUTPATIENT
Start: 2021-05-04 | End: 2021-10-28

## 2021-05-04 RX ORDER — INSULIN DETEMIR 100 [IU]/ML
INJECTION, SOLUTION SUBCUTANEOUS
Qty: 15 ML | Refills: 3 | Status: SHIPPED | OUTPATIENT
Start: 2021-05-04 | End: 2021-05-22

## 2021-05-21 ENCOUNTER — APPOINTMENT (OUTPATIENT)
Dept: LAB | Facility: CLINIC | Age: 86
End: 2021-05-21
Payer: COMMERCIAL

## 2021-05-21 DIAGNOSIS — E78.5 HYPERLIPIDEMIA, UNSPECIFIED HYPERLIPIDEMIA TYPE: ICD-10-CM

## 2021-05-21 DIAGNOSIS — N18.31 TYPE 2 DIABETES MELLITUS WITH STAGE 3A CHRONIC KIDNEY DISEASE, WITH LONG-TERM CURRENT USE OF INSULIN (HCC): ICD-10-CM

## 2021-05-21 DIAGNOSIS — E11.22 TYPE 2 DIABETES MELLITUS WITH STAGE 3A CHRONIC KIDNEY DISEASE, WITH LONG-TERM CURRENT USE OF INSULIN (HCC): ICD-10-CM

## 2021-05-21 DIAGNOSIS — I10 ESSENTIAL HYPERTENSION: ICD-10-CM

## 2021-05-21 DIAGNOSIS — Z79.4 TYPE 2 DIABETES MELLITUS WITH STAGE 3A CHRONIC KIDNEY DISEASE, WITH LONG-TERM CURRENT USE OF INSULIN (HCC): ICD-10-CM

## 2021-05-21 LAB
ALBUMIN SERPL BCP-MCNC: 3.8 G/DL (ref 3.5–5)
ALP SERPL-CCNC: 75 U/L (ref 46–116)
ALT SERPL W P-5'-P-CCNC: 19 U/L (ref 12–78)
ANION GAP SERPL CALCULATED.3IONS-SCNC: 7 MMOL/L (ref 4–13)
AST SERPL W P-5'-P-CCNC: 17 U/L (ref 5–45)
BILIRUB SERPL-MCNC: 0.82 MG/DL (ref 0.2–1)
BUN SERPL-MCNC: 21 MG/DL (ref 5–25)
CALCIUM SERPL-MCNC: 9.6 MG/DL (ref 8.3–10.1)
CHLORIDE SERPL-SCNC: 104 MMOL/L (ref 100–108)
CHOLEST SERPL-MCNC: 171 MG/DL (ref 50–200)
CO2 SERPL-SCNC: 29 MMOL/L (ref 21–32)
CREAT SERPL-MCNC: 1.07 MG/DL (ref 0.6–1.3)
ERYTHROCYTE [DISTWIDTH] IN BLOOD BY AUTOMATED COUNT: 12.3 % (ref 11.6–15.1)
EST. AVERAGE GLUCOSE BLD GHB EST-MCNC: 151 MG/DL
GFR SERPL CREATININE-BSD FRML MDRD: 47 ML/MIN/1.73SQ M
GLUCOSE P FAST SERPL-MCNC: 165 MG/DL (ref 65–99)
HBA1C MFR BLD: 6.9 %
HCT VFR BLD AUTO: 46.4 % (ref 34.8–46.1)
HDLC SERPL-MCNC: 44 MG/DL
HGB BLD-MCNC: 14.9 G/DL (ref 11.5–15.4)
LDLC SERPL CALC-MCNC: 92 MG/DL (ref 0–100)
MCH RBC QN AUTO: 30.3 PG (ref 26.8–34.3)
MCHC RBC AUTO-ENTMCNC: 32.1 G/DL (ref 31.4–37.4)
MCV RBC AUTO: 95 FL (ref 82–98)
NONHDLC SERPL-MCNC: 127 MG/DL
PLATELET # BLD AUTO: 210 THOUSANDS/UL (ref 149–390)
PMV BLD AUTO: 11 FL (ref 8.9–12.7)
POTASSIUM SERPL-SCNC: 4.5 MMOL/L (ref 3.5–5.3)
PROT SERPL-MCNC: 7.2 G/DL (ref 6.4–8.2)
RBC # BLD AUTO: 4.91 MILLION/UL (ref 3.81–5.12)
SODIUM SERPL-SCNC: 140 MMOL/L (ref 136–145)
TRIGL SERPL-MCNC: 173 MG/DL
WBC # BLD AUTO: 7.3 THOUSAND/UL (ref 4.31–10.16)

## 2021-05-21 PROCEDURE — 36415 COLL VENOUS BLD VENIPUNCTURE: CPT

## 2021-05-21 PROCEDURE — 80061 LIPID PANEL: CPT

## 2021-05-21 PROCEDURE — 83036 HEMOGLOBIN GLYCOSYLATED A1C: CPT

## 2021-05-21 PROCEDURE — 85027 COMPLETE CBC AUTOMATED: CPT

## 2021-05-21 PROCEDURE — 80053 COMPREHEN METABOLIC PANEL: CPT

## 2021-05-22 DIAGNOSIS — N18.30 TYPE 2 DIABETES MELLITUS WITH STAGE 3 CHRONIC KIDNEY DISEASE, WITH LONG-TERM CURRENT USE OF INSULIN (HCC): ICD-10-CM

## 2021-05-22 DIAGNOSIS — Z79.4 TYPE 2 DIABETES MELLITUS WITH STAGE 3 CHRONIC KIDNEY DISEASE, WITH LONG-TERM CURRENT USE OF INSULIN (HCC): ICD-10-CM

## 2021-05-22 DIAGNOSIS — E11.22 TYPE 2 DIABETES MELLITUS WITH STAGE 3 CHRONIC KIDNEY DISEASE, WITH LONG-TERM CURRENT USE OF INSULIN (HCC): ICD-10-CM

## 2021-05-22 RX ORDER — INSULIN DETEMIR 100 [IU]/ML
INJECTION, SOLUTION SUBCUTANEOUS
Qty: 15 ML | Refills: 2 | Status: SHIPPED | OUTPATIENT
Start: 2021-05-22 | End: 2021-10-04

## 2021-06-01 ENCOUNTER — RA CDI HCC (OUTPATIENT)
Dept: OTHER | Facility: HOSPITAL | Age: 86
End: 2021-06-01

## 2021-06-01 NOTE — PROGRESS NOTES
[facility] 75  coding opportunities             Chart reviewed, (number of) suggestions sent to provider: 3           Patients insurance company: [facility] [address]  (Medicare [employer] and Commercial)     Visit status: Patient arrived for their scheduled appointment     Provider never responded to [facility]  E11 22, N18 31 used, E11 40 not used     [facility]  coding opportunities             Chart reviewed, (number of) suggestions sent to provider: 3           Patients insurance company: [facility] [address]  [employer])           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

## 2021-06-08 ENCOUNTER — OFFICE VISIT (OUTPATIENT)
Dept: FAMILY MEDICINE CLINIC | Facility: CLINIC | Age: 86
End: 2021-06-08
Payer: COMMERCIAL

## 2021-06-08 VITALS
WEIGHT: 158.4 LBS | HEIGHT: 60 IN | DIASTOLIC BLOOD PRESSURE: 78 MMHG | OXYGEN SATURATION: 96 % | BODY MASS INDEX: 31.1 KG/M2 | TEMPERATURE: 97.5 F | HEART RATE: 80 BPM | SYSTOLIC BLOOD PRESSURE: 148 MMHG | RESPIRATION RATE: 16 BRPM

## 2021-06-08 DIAGNOSIS — Z79.4 TYPE 2 DIABETES MELLITUS WITH STAGE 3A CHRONIC KIDNEY DISEASE, WITH LONG-TERM CURRENT USE OF INSULIN (HCC): Primary | ICD-10-CM

## 2021-06-08 DIAGNOSIS — M85.80 OSTEOPENIA, UNSPECIFIED LOCATION: ICD-10-CM

## 2021-06-08 DIAGNOSIS — E78.2 MIXED HYPERLIPIDEMIA: ICD-10-CM

## 2021-06-08 DIAGNOSIS — E11.22 TYPE 2 DIABETES MELLITUS WITH STAGE 3A CHRONIC KIDNEY DISEASE, WITH LONG-TERM CURRENT USE OF INSULIN (HCC): Primary | ICD-10-CM

## 2021-06-08 DIAGNOSIS — I10 ESSENTIAL HYPERTENSION: ICD-10-CM

## 2021-06-08 DIAGNOSIS — N18.31 TYPE 2 DIABETES MELLITUS WITH STAGE 3A CHRONIC KIDNEY DISEASE, WITH LONG-TERM CURRENT USE OF INSULIN (HCC): Primary | ICD-10-CM

## 2021-06-08 DIAGNOSIS — E11.40 TYPE 2 DIABETES MELLITUS WITH DIABETIC NEUROPATHY, WITH LONG-TERM CURRENT USE OF INSULIN (HCC): ICD-10-CM

## 2021-06-08 DIAGNOSIS — Z78.0 POSTMENOPAUSAL: ICD-10-CM

## 2021-06-08 DIAGNOSIS — Z79.4 TYPE 2 DIABETES MELLITUS WITH DIABETIC NEUROPATHY, WITH LONG-TERM CURRENT USE OF INSULIN (HCC): ICD-10-CM

## 2021-06-08 DIAGNOSIS — E78.5 HYPERLIPIDEMIA, UNSPECIFIED HYPERLIPIDEMIA TYPE: ICD-10-CM

## 2021-06-08 PROBLEM — B35.1 ONYCHOMYCOSIS: Status: ACTIVE | Noted: 2020-12-03

## 2021-06-08 PROCEDURE — 3078F DIAST BP <80 MM HG: CPT | Performed by: FAMILY MEDICINE

## 2021-06-08 PROCEDURE — 1160F RVW MEDS BY RX/DR IN RCRD: CPT | Performed by: FAMILY MEDICINE

## 2021-06-08 PROCEDURE — 1101F PT FALLS ASSESS-DOCD LE1/YR: CPT | Performed by: FAMILY MEDICINE

## 2021-06-08 PROCEDURE — 3288F FALL RISK ASSESSMENT DOCD: CPT | Performed by: FAMILY MEDICINE

## 2021-06-08 PROCEDURE — 3077F SYST BP >= 140 MM HG: CPT | Performed by: FAMILY MEDICINE

## 2021-06-08 PROCEDURE — 1036F TOBACCO NON-USER: CPT | Performed by: FAMILY MEDICINE

## 2021-06-08 PROCEDURE — 99214 OFFICE O/P EST MOD 30 MIN: CPT | Performed by: FAMILY MEDICINE

## 2021-06-08 PROCEDURE — 3725F SCREEN DEPRESSION PERFORMED: CPT | Performed by: FAMILY MEDICINE

## 2021-06-08 RX ORDER — SIMVASTATIN 80 MG
80 TABLET ORAL DAILY
Qty: 90 TABLET | Refills: 3 | Status: SHIPPED | OUTPATIENT
Start: 2021-06-08 | End: 2022-07-26

## 2021-06-08 NOTE — PROGRESS NOTES
Chief Complaint   Patient presents with    Follow-up     6 months and review labs  Health Maintenance   Topic Date Due    DTaP,Tdap,and Td Vaccines (2 - Td or Tdap) 01/01/2010    PT PLAN OF CARE  10/08/2020    Influenza Vaccine (Season Ended) 09/01/2021    HEMOGLOBIN A1C  11/21/2021    Medicare Annual Wellness Visit (AWV)  12/04/2021    DM Eye Exam  12/16/2021    Fall Risk  06/08/2022    Depression Screening PHQ  06/08/2022    BMI: Followup Plan  06/08/2022    BMI: Adult  06/08/2022    Diabetic Foot Exam  06/08/2022    DXA SCAN  08/21/2022    Pneumococcal Vaccine: 65+ Years  Completed    COVID-19 Vaccine  Completed    HIB Vaccine  Aged Out    Hepatitis B Vaccine  Aged Out    IPV Vaccine  Aged Out    Hepatitis A Vaccine  Aged Out    Meningococcal ACWY Vaccine  Aged Out    HPV Vaccine  Aged Out       BMI Counseling: Body mass index is 30 94 kg/m²  The BMI is above normal  Nutrition recommendations include decreasing portion sizes, encouraging healthy choices of fruits and vegetables, decreasing fast food intake, consuming healthier snacks and limiting drinks that contain sugar  Exercise recommendations include moderate physical activity 150 minutes/week  No pharmacotherapy was ordered  Assessment/Plan:    Type 2 diabetes mellitus with stage 3 chronic kidney disease, with long-term current use of insulin ([facility])    Lab Results   Component Value Date    HGBA1C 6 9 (H) 05/21/2021     Stable  Low carb diet  Continue levemir 37u am and 34 u pm and novology 9/10/10u with meals  Hypertension  Stable  DASH diet  Continue losartan 50mg QD  Hyperlipidemia  5/2021 lipid 171/173/44/92 stable  Low fat diet  Continue simvastatin 80mg qhs  Osteopenia  Will check Dexa  Continue calcium every other day and vit D supplement  Reviewed lab in 5/2021  Lipid 171/173/44/92 ok  CBC ok  CMP stable, calcium normal  hgA1C 6 9 stable    Got flu shot yearly  Got covid19 vaccine   Denies SE  Got prevnar 4/2015  Got pneumovax 2016  Will check insurance for coverage of Tdap  Got shingrix 2019    Dexa 8/2019, osteopenia, stable  Pt is on calcium/vitD and exercise regularly  Fall precautions  RTO in 6 months with labs           Diagnoses and all orders for this visit:    Type 2 diabetes mellitus with stage 3a chronic kidney disease, with long-term current use of insulin (HCC)  -     Insulin Pen Needle 32G X 4 MM MISC; Use 4 (four) times a day  -     CBC; Future  -     Comprehensive metabolic panel; Future  -     Hemoglobin A1C; Future  -     Lipid panel; Future  -     Microalbumin / creatinine urine ratio; Future  -     TSH, 3rd generation with Free T4 reflex; Future    Essential hypertension  -     CBC; Future  -     Comprehensive metabolic panel; Future  -     Hemoglobin A1C; Future  -     Lipid panel; Future  -     Microalbumin / creatinine urine ratio; Future  -     TSH, 3rd generation with Free T4 reflex; Future    Hyperlipidemia, unspecified hyperlipidemia type  -     CBC; Future  -     Comprehensive metabolic panel; Future  -     Hemoglobin A1C; Future  -     Lipid panel; Future  -     Microalbumin / creatinine urine ratio; Future  -     TSH, 3rd generation with Free T4 reflex; Future    Osteopenia, unspecified location  -     DXA bone density spine hip and pelvis; Future    Postmenopausal  -     DXA bone density spine hip and pelvis; Future    Mixed hyperlipidemia  -     simvastatin (ZOCOR) 80 mg tablet; Take 1 tablet (80 mg total) by mouth daily    BMI 30 0-30 9,adult    Type 2 diabetes mellitus with diabetic neuropathy, with long-term current use of insulin (HCC)          Subjective:      Patient ID: Sharron Ordonez is a 81 y o  female  HPI    FU pain specialist for back pain which helped       DM---5/2021 hgA1C 6 9 stable  Pt states she is on levemir 37u am and 34u pm  Fasting NE   Usually novolog 9u/10u/10u with meals     Pt brought different insulin needles in office   Pt states when she used long needles, she felt burning and skin got red, would like short needles  Denies hypoglycemia    Sometimes hand burning neuropathy    FU opthalmology Dr Dyana Smalls  Right eye cannot see  FU retinal specialist also per pt  FU podiatry Dr Kellie Wayne Q 3-4 months  Appt will be next week       HTN----She is on losartan 50mg QD  Denies SOB, CP, headache etc    Does not check BP at home  Today BP is ok       Hyperlipidemia----she is on simvastatin 80mg qhs  Denies side effects       Pt lives by herself  Does all ADL's  Still driving  Had children and great-grandson living close to her  Denies recent falls  Denies depression          The following portions of the patient's history were reviewed and updated as appropriate: allergies, current medications, past family history, past medical history, past social history, past surgical history and problem list     Review of Systems   Constitutional: Negative for appetite change, chills and fever  HENT: Negative for congestion, ear pain, sinus pain and sore throat  Eyes: Negative for discharge and itching  Respiratory: Negative for apnea, cough, chest tightness, shortness of breath and wheezing  Cardiovascular: Negative for chest pain, palpitations and leg swelling  Gastrointestinal: Negative for abdominal pain, anal bleeding, constipation, diarrhea, nausea and vomiting  Endocrine: Negative for cold intolerance, heat intolerance and polyuria  Genitourinary: Negative for difficulty urinating and dysuria  Musculoskeletal: Negative for arthralgias, back pain and myalgias  Skin: Negative for rash  Neurological: Negative for dizziness and headaches  Psychiatric/Behavioral: Negative for agitation           Objective:      /78 (BP Location: Left arm, Patient Position: Sitting, Cuff Size: Adult)   Pulse 80   Temp 97 5 °F (36 4 °C) (Temporal)   Resp 16   Ht 5' (1 524 m)   Wt 71 8 kg (158 lb 6 4 oz)   SpO2 96% BMI 30 94 kg/m²          Physical Exam  Constitutional:       General: She is not in acute distress  Appearance: She is well-developed  HENT:      Head: Normocephalic  Eyes:      General:         Right eye: No discharge  Left eye: No discharge  Conjunctiva/sclera: Conjunctivae normal    Neck:      Musculoskeletal: Normal range of motion  Thyroid: No thyromegaly  Cardiovascular:      Rate and Rhythm: Normal rate and regular rhythm  Pulses: Pulses are weak  Dorsalis pedis pulses are 1+ on the right side and 1+ on the left side  Heart sounds: Normal heart sounds  No murmur  No friction rub  No gallop  Pulmonary:      Effort: Pulmonary effort is normal  No respiratory distress  Breath sounds: Normal breath sounds  No wheezing or rales  Chest:      Chest wall: No tenderness  Abdominal:      General: Bowel sounds are normal  There is no distension  Palpations: Abdomen is soft  There is no mass  Tenderness: There is no abdominal tenderness  There is no guarding or rebound  Musculoskeletal: Normal range of motion  General: No tenderness or deformity  Feet:      Right foot:      Skin integrity: No ulcer, skin breakdown, erythema, warmth, callus or dry skin  Left foot:      Skin integrity: No ulcer, skin breakdown, erythema, warmth, callus or dry skin  Lymphadenopathy:      Cervical: No cervical adenopathy  Neurological:      Mental Status: She is alert  Patient's shoes and socks removed  Right Foot/Ankle   Right Foot Inspection  Skin Exam: skin normal and skin intact no dry skin, no warmth, no callus, no erythema, no maceration, no abnormal color, no pre-ulcer, no ulcer and no callus                            Sensory       Monofilament testing: intact  Vascular    The right DP pulse is 1+       Left Foot/Ankle  Left Foot Inspection  Skin Exam: skin normal and skin intactno dry skin, no warmth, no erythema, no maceration, normal color, no pre-ulcer, no ulcer and no callus                                         Sensory       Monofilament: intact  Vascular    The left DP pulse is 1+  Assign Risk Category:  No deformity present;  No loss of protective sensation; Weak pulses       Risk: 1

## 2021-06-08 NOTE — ASSESSMENT & PLAN NOTE
Lab Results   Component Value Date    HGBA1C 6 9 (H) 05/21/2021     Stable  Low carb diet  Continue levemir 37u am and 34 u pm and novology 9/10/10u with meals

## 2021-09-10 ENCOUNTER — HOSPITAL ENCOUNTER (OUTPATIENT)
Dept: BONE DENSITY | Facility: MEDICAL CENTER | Age: 86
Discharge: HOME | End: 2021-09-10
Payer: COMMERCIAL

## 2021-09-10 DIAGNOSIS — M85.80 OSTEOPENIA, UNSPECIFIED LOCATION: ICD-10-CM

## 2021-09-10 DIAGNOSIS — Z78.0 POSTMENOPAUSAL: ICD-10-CM

## 2021-09-10 PROCEDURE — 77080 DXA BONE DENSITY AXIAL: CPT

## 2021-10-05 DIAGNOSIS — E11.22 TYPE 2 DIABETES MELLITUS WITH STAGE 3 CHRONIC KIDNEY DISEASE, WITH LONG-TERM CURRENT USE OF INSULIN (HCC): ICD-10-CM

## 2021-10-05 DIAGNOSIS — N18.30 TYPE 2 DIABETES MELLITUS WITH STAGE 3 CHRONIC KIDNEY DISEASE, WITH LONG-TERM CURRENT USE OF INSULIN (HCC): ICD-10-CM

## 2021-10-05 DIAGNOSIS — Z79.4 TYPE 2 DIABETES MELLITUS WITH STAGE 3 CHRONIC KIDNEY DISEASE, WITH LONG-TERM CURRENT USE OF INSULIN (HCC): ICD-10-CM

## 2021-10-08 RX ORDER — INSULIN DETEMIR 100 [IU]/ML
INJECTION, SOLUTION SUBCUTANEOUS
Qty: 45 ML | Refills: 1 | Status: SHIPPED | OUTPATIENT
Start: 2021-10-08 | End: 2021-12-02 | Stop reason: SDUPTHER

## 2021-10-09 ENCOUNTER — IMMUNIZATIONS (OUTPATIENT)
Dept: FAMILY MEDICINE CLINIC | Facility: HOSPITAL | Age: 86
End: 2021-10-09

## 2021-10-09 DIAGNOSIS — Z23 ENCOUNTER FOR IMMUNIZATION: Primary | ICD-10-CM

## 2021-10-09 PROCEDURE — 0001A SARS-COV-2 / COVID-19 MRNA VACCINE (PFIZER-BIONTECH) 30 MCG: CPT

## 2021-10-09 PROCEDURE — 91300 SARS-COV-2 / COVID-19 MRNA VACCINE (PFIZER-BIONTECH) 30 MCG: CPT

## 2021-11-29 ENCOUNTER — APPOINTMENT (OUTPATIENT)
Dept: LAB | Facility: CLINIC | Age: 86
End: 2021-11-29
Payer: COMMERCIAL

## 2021-11-29 DIAGNOSIS — N18.31 TYPE 2 DIABETES MELLITUS WITH STAGE 3A CHRONIC KIDNEY DISEASE, WITH LONG-TERM CURRENT USE OF INSULIN (HCC): ICD-10-CM

## 2021-11-29 DIAGNOSIS — E11.22 TYPE 2 DIABETES MELLITUS WITH STAGE 3A CHRONIC KIDNEY DISEASE, WITH LONG-TERM CURRENT USE OF INSULIN (HCC): ICD-10-CM

## 2021-11-29 DIAGNOSIS — E78.5 HYPERLIPIDEMIA, UNSPECIFIED HYPERLIPIDEMIA TYPE: ICD-10-CM

## 2021-11-29 DIAGNOSIS — Z79.4 TYPE 2 DIABETES MELLITUS WITH STAGE 3A CHRONIC KIDNEY DISEASE, WITH LONG-TERM CURRENT USE OF INSULIN (HCC): ICD-10-CM

## 2021-11-29 DIAGNOSIS — I10 ESSENTIAL HYPERTENSION: ICD-10-CM

## 2021-11-29 LAB
ALBUMIN SERPL BCP-MCNC: 3.7 G/DL (ref 3.5–5)
ALP SERPL-CCNC: 82 U/L (ref 46–116)
ALT SERPL W P-5'-P-CCNC: 20 U/L (ref 12–78)
ANION GAP SERPL CALCULATED.3IONS-SCNC: 7 MMOL/L (ref 4–13)
AST SERPL W P-5'-P-CCNC: 20 U/L (ref 5–45)
BILIRUB SERPL-MCNC: 0.59 MG/DL (ref 0.2–1)
BUN SERPL-MCNC: 25 MG/DL (ref 5–25)
CALCIUM SERPL-MCNC: 9.7 MG/DL (ref 8.3–10.1)
CHLORIDE SERPL-SCNC: 108 MMOL/L (ref 100–108)
CHOLEST SERPL-MCNC: 165 MG/DL
CO2 SERPL-SCNC: 27 MMOL/L (ref 21–32)
CREAT SERPL-MCNC: 1.11 MG/DL (ref 0.6–1.3)
CREAT UR-MCNC: 103 MG/DL
ERYTHROCYTE [DISTWIDTH] IN BLOOD BY AUTOMATED COUNT: 12 % (ref 11.6–15.1)
EST. AVERAGE GLUCOSE BLD GHB EST-MCNC: 146 MG/DL
GFR SERPL CREATININE-BSD FRML MDRD: 45 ML/MIN/1.73SQ M
GLUCOSE P FAST SERPL-MCNC: 114 MG/DL (ref 65–99)
HBA1C MFR BLD: 6.7 %
HCT VFR BLD AUTO: 47.4 % (ref 34.8–46.1)
HDLC SERPL-MCNC: 45 MG/DL
HGB BLD-MCNC: 15 G/DL (ref 11.5–15.4)
LDLC SERPL CALC-MCNC: 87 MG/DL (ref 0–100)
MCH RBC QN AUTO: 30.2 PG (ref 26.8–34.3)
MCHC RBC AUTO-ENTMCNC: 31.6 G/DL (ref 31.4–37.4)
MCV RBC AUTO: 96 FL (ref 82–98)
MICROALBUMIN UR-MCNC: 12.9 MG/L (ref 0–20)
MICROALBUMIN/CREAT 24H UR: 13 MG/G CREATININE (ref 0–30)
NONHDLC SERPL-MCNC: 120 MG/DL
PLATELET # BLD AUTO: 221 THOUSANDS/UL (ref 149–390)
PMV BLD AUTO: 10.6 FL (ref 8.9–12.7)
POTASSIUM SERPL-SCNC: 4.4 MMOL/L (ref 3.5–5.3)
PROT SERPL-MCNC: 7.6 G/DL (ref 6.4–8.2)
RBC # BLD AUTO: 4.96 MILLION/UL (ref 3.81–5.12)
SODIUM SERPL-SCNC: 142 MMOL/L (ref 136–145)
TRIGL SERPL-MCNC: 163 MG/DL
TSH SERPL DL<=0.05 MIU/L-ACNC: 2.95 UIU/ML (ref 0.36–3.74)
WBC # BLD AUTO: 8.47 THOUSAND/UL (ref 4.31–10.16)

## 2021-11-29 PROCEDURE — 84443 ASSAY THYROID STIM HORMONE: CPT

## 2021-11-29 PROCEDURE — 83036 HEMOGLOBIN GLYCOSYLATED A1C: CPT

## 2021-11-29 PROCEDURE — 80053 COMPREHEN METABOLIC PANEL: CPT

## 2021-11-29 PROCEDURE — 36415 COLL VENOUS BLD VENIPUNCTURE: CPT

## 2021-11-29 PROCEDURE — 82570 ASSAY OF URINE CREATININE: CPT

## 2021-11-29 PROCEDURE — 85027 COMPLETE CBC AUTOMATED: CPT

## 2021-11-29 PROCEDURE — 82043 UR ALBUMIN QUANTITATIVE: CPT

## 2021-11-29 PROCEDURE — 80061 LIPID PANEL: CPT

## 2021-12-02 ENCOUNTER — RA CDI HCC (OUTPATIENT)
Dept: OTHER | Facility: HOSPITAL | Age: 86
End: 2021-12-02

## 2021-12-02 DIAGNOSIS — N18.30 TYPE 2 DIABETES MELLITUS WITH STAGE 3 CHRONIC KIDNEY DISEASE, WITH LONG-TERM CURRENT USE OF INSULIN (HCC): ICD-10-CM

## 2021-12-02 DIAGNOSIS — Z79.4 TYPE 2 DIABETES MELLITUS WITH STAGE 3 CHRONIC KIDNEY DISEASE, WITH LONG-TERM CURRENT USE OF INSULIN (HCC): ICD-10-CM

## 2021-12-02 DIAGNOSIS — E11.22 TYPE 2 DIABETES MELLITUS WITH STAGE 3 CHRONIC KIDNEY DISEASE, WITH LONG-TERM CURRENT USE OF INSULIN (HCC): ICD-10-CM

## 2021-12-02 RX ORDER — INSULIN DETEMIR 100 [IU]/ML
INJECTION, SOLUTION SUBCUTANEOUS
Qty: 66 ML | Refills: 0 | Status: SHIPPED | OUTPATIENT
Start: 2021-12-02 | End: 2021-12-08 | Stop reason: SDUPTHER

## 2021-12-08 ENCOUNTER — OFFICE VISIT (OUTPATIENT)
Dept: FAMILY MEDICINE CLINIC | Facility: CLINIC | Age: 86
End: 2021-12-08
Payer: COMMERCIAL

## 2021-12-08 VITALS
SYSTOLIC BLOOD PRESSURE: 146 MMHG | RESPIRATION RATE: 20 BRPM | TEMPERATURE: 98 F | HEART RATE: 68 BPM | HEIGHT: 60 IN | WEIGHT: 160.2 LBS | DIASTOLIC BLOOD PRESSURE: 66 MMHG | BODY MASS INDEX: 31.45 KG/M2

## 2021-12-08 DIAGNOSIS — M85.80 OSTEOPENIA, UNSPECIFIED LOCATION: ICD-10-CM

## 2021-12-08 DIAGNOSIS — E11.22 TYPE 2 DIABETES MELLITUS WITH STAGE 3 CHRONIC KIDNEY DISEASE, WITH LONG-TERM CURRENT USE OF INSULIN (HCC): ICD-10-CM

## 2021-12-08 DIAGNOSIS — Z79.4 TYPE 2 DIABETES MELLITUS WITH STAGE 3A CHRONIC KIDNEY DISEASE, WITH LONG-TERM CURRENT USE OF INSULIN (HCC): Primary | ICD-10-CM

## 2021-12-08 DIAGNOSIS — N18.31 TYPE 2 DIABETES MELLITUS WITH STAGE 3A CHRONIC KIDNEY DISEASE, WITH LONG-TERM CURRENT USE OF INSULIN (HCC): Primary | ICD-10-CM

## 2021-12-08 DIAGNOSIS — I10 PRIMARY HYPERTENSION: ICD-10-CM

## 2021-12-08 DIAGNOSIS — N18.30 TYPE 2 DIABETES MELLITUS WITH STAGE 3 CHRONIC KIDNEY DISEASE, WITH LONG-TERM CURRENT USE OF INSULIN (HCC): ICD-10-CM

## 2021-12-08 DIAGNOSIS — E11.3292 NONPROLIFERATIVE DIABETIC RETINOPATHY OF LEFT EYE (HCC): ICD-10-CM

## 2021-12-08 DIAGNOSIS — Z79.4 TYPE 2 DIABETES MELLITUS WITH STAGE 3 CHRONIC KIDNEY DISEASE, WITH LONG-TERM CURRENT USE OF INSULIN (HCC): ICD-10-CM

## 2021-12-08 DIAGNOSIS — E11.22 TYPE 2 DIABETES MELLITUS WITH STAGE 3A CHRONIC KIDNEY DISEASE, WITH LONG-TERM CURRENT USE OF INSULIN (HCC): Primary | ICD-10-CM

## 2021-12-08 DIAGNOSIS — E78.5 HYPERLIPIDEMIA, UNSPECIFIED HYPERLIPIDEMIA TYPE: ICD-10-CM

## 2021-12-08 DIAGNOSIS — Z00.00 MEDICARE ANNUAL WELLNESS VISIT, SUBSEQUENT: ICD-10-CM

## 2021-12-08 PROCEDURE — 1160F RVW MEDS BY RX/DR IN RCRD: CPT | Performed by: FAMILY MEDICINE

## 2021-12-08 PROCEDURE — 1036F TOBACCO NON-USER: CPT | Performed by: FAMILY MEDICINE

## 2021-12-08 PROCEDURE — 1170F FXNL STATUS ASSESSED: CPT | Performed by: FAMILY MEDICINE

## 2021-12-08 PROCEDURE — 3725F SCREEN DEPRESSION PERFORMED: CPT | Performed by: FAMILY MEDICINE

## 2021-12-08 PROCEDURE — 99214 OFFICE O/P EST MOD 30 MIN: CPT | Performed by: FAMILY MEDICINE

## 2021-12-08 PROCEDURE — 3078F DIAST BP <80 MM HG: CPT | Performed by: FAMILY MEDICINE

## 2021-12-08 PROCEDURE — 3288F FALL RISK ASSESSMENT DOCD: CPT | Performed by: FAMILY MEDICINE

## 2021-12-08 PROCEDURE — 3077F SYST BP >= 140 MM HG: CPT | Performed by: FAMILY MEDICINE

## 2021-12-08 PROCEDURE — 1101F PT FALLS ASSESS-DOCD LE1/YR: CPT | Performed by: FAMILY MEDICINE

## 2021-12-08 PROCEDURE — G0439 PPPS, SUBSEQ VISIT: HCPCS | Performed by: FAMILY MEDICINE

## 2021-12-08 RX ORDER — INSULIN DETEMIR 100 [IU]/ML
INJECTION, SOLUTION SUBCUTANEOUS
Qty: 75 ML | Refills: 1
Start: 2021-12-08 | End: 2022-02-28 | Stop reason: SDUPTHER

## 2022-01-18 ENCOUNTER — OFFICE VISIT (OUTPATIENT)
Dept: FAMILY MEDICINE CLINIC | Facility: CLINIC | Age: 87
End: 2022-01-18
Payer: COMMERCIAL

## 2022-01-18 VITALS
BODY MASS INDEX: 31.22 KG/M2 | RESPIRATION RATE: 15 BRPM | SYSTOLIC BLOOD PRESSURE: 124 MMHG | HEART RATE: 76 BPM | HEIGHT: 60 IN | WEIGHT: 159 LBS | OXYGEN SATURATION: 98 % | TEMPERATURE: 98 F | DIASTOLIC BLOOD PRESSURE: 80 MMHG

## 2022-01-18 DIAGNOSIS — L02.214 ABSCESS OF GROIN, RIGHT: Primary | ICD-10-CM

## 2022-01-18 PROCEDURE — 99213 OFFICE O/P EST LOW 20 MIN: CPT | Performed by: FAMILY MEDICINE

## 2022-01-18 RX ORDER — CEPHALEXIN 500 MG/1
500 CAPSULE ORAL EVERY 8 HOURS SCHEDULED
Qty: 30 CAPSULE | Refills: 0 | Status: SHIPPED | OUTPATIENT
Start: 2022-01-18 | End: 2022-01-28

## 2022-01-18 RX ORDER — SULFAMETHOXAZOLE AND TRIMETHOPRIM 800; 160 MG/1; MG/1
1 TABLET ORAL EVERY 12 HOURS SCHEDULED
Qty: 20 TABLET | Refills: 0 | Status: SHIPPED | OUTPATIENT
Start: 2022-01-18 | End: 2022-01-28

## 2022-01-18 NOTE — PROGRESS NOTES
Assessment/Plan:    Give antibiotics  SE educated pt  Go to ER if symptoms no improving in 2-3 days, may need I&D  Diagnoses and all orders for this visit:    Abscess of groin, right  -     cephalexin (KEFLEX) 500 mg capsule; Take 1 capsule (500 mg total) by mouth every 8 (eight) hours for 10 days  -     sulfamethoxazole-trimethoprim (BACTRIM DS) 800-160 mg per tablet; Take 1 tablet by mouth every 12 (twelve) hours for 10 days          Subjective:      Patient ID: Maria Teresa Garber is a 81 y o  female  HPI    Pt is here by herself  C/o right groin area pain for 1 week  Felt uncomfortable in the beginning  Then it was painful if her underwear touch it  Some discharge last night  Denies fever  Denies SOB,CP,n/v/abd pain  Tried neosporin cream but no help  The following portions of the patient's history were reviewed and updated as appropriate: allergies, current medications, past family history, past medical history, past social history, past surgical history and problem list     Review of Systems   Constitutional: Negative for appetite change, chills and fever  HENT: Negative for congestion, ear pain, sinus pain and sore throat  Eyes: Negative for discharge and itching  Respiratory: Negative for apnea, cough, chest tightness, shortness of breath and wheezing  Cardiovascular: Negative for chest pain, palpitations and leg swelling  Gastrointestinal: Negative for abdominal pain, anal bleeding, constipation, diarrhea, nausea and vomiting  Endocrine: Negative for cold intolerance, heat intolerance and polyuria  Genitourinary: Negative for difficulty urinating and dysuria  Musculoskeletal: Negative for arthralgias, back pain and myalgias  Skin: Positive for rash  Neurological: Negative for dizziness and headaches  Psychiatric/Behavioral: Negative for agitation           Objective:      /80   Pulse 76   Temp 98 °F (36 7 °C) (Tympanic)   Resp 15   Ht 5' (1 524 m)   Wt 72 1 kg (159 lb)   SpO2 98%   BMI 31 05 kg/m²          Physical Exam  Constitutional:       Appearance: Normal appearance  Eyes:      General:         Right eye: No discharge  Left eye: No discharge  Conjunctiva/sclera: Conjunctivae normal    Cardiovascular:      Rate and Rhythm: Normal rate and regular rhythm  Pulmonary:      Effort: Pulmonary effort is normal       Breath sounds: Normal breath sounds  Musculoskeletal:      Cervical back: Normal range of motion  Skin:     Findings: Lesion present  Comments: Right groin area swollen and erythema, size 2cm, some pus discharge   Neurological:      Mental Status: She is alert

## 2022-01-22 ENCOUNTER — HOSPITAL ENCOUNTER (EMERGENCY)
Facility: HOSPITAL | Age: 87
Discharge: HOME | End: 2022-01-22
Attending: EMERGENCY MEDICINE
Payer: COMMERCIAL

## 2022-01-22 VITALS
BODY MASS INDEX: 31.16 KG/M2 | HEART RATE: 86 BPM | RESPIRATION RATE: 18 BRPM | DIASTOLIC BLOOD PRESSURE: 75 MMHG | WEIGHT: 158.73 LBS | SYSTOLIC BLOOD PRESSURE: 193 MMHG | TEMPERATURE: 96.3 F | HEIGHT: 60 IN | OXYGEN SATURATION: 98 %

## 2022-01-22 DIAGNOSIS — L02.91 ABSCESS: Primary | ICD-10-CM

## 2022-01-22 PROCEDURE — 10061 I&D ABSCESS COMP/MULTIPLE: CPT | Performed by: EMERGENCY MEDICINE

## 2022-01-22 PROCEDURE — 99282 EMERGENCY DEPT VISIT SF MDM: CPT

## 2022-01-22 PROCEDURE — 99284 EMERGENCY DEPT VISIT MOD MDM: CPT | Performed by: EMERGENCY MEDICINE

## 2022-01-22 RX ORDER — LIDOCAINE HYDROCHLORIDE AND EPINEPHRINE 10; 10 MG/ML; UG/ML
5 INJECTION, SOLUTION INFILTRATION; PERINEURAL ONCE
Status: COMPLETED | OUTPATIENT
Start: 2022-01-22 | End: 2022-01-22

## 2022-01-22 RX ADMIN — LIDOCAINE HYDROCHLORIDE,EPINEPHRINE BITARTRATE 5 ML: 10; .01 INJECTION, SOLUTION INFILTRATION; PERINEURAL at 08:47

## 2022-01-22 NOTE — ED NOTES
Dr Enrique Herrera at bedside evaluating patient       Elyse Martinez, [facility]  01/22/22 5705 Followed protocol

## 2022-01-22 NOTE — ED ATTENDING ATTESTATION
1/22/2022  Renan Antony DO, saw and evaluated the patient  I have discussed the patient with the resident/non-physician practitioner and agree with the resident's/non-physician practitioner's findings, Plan of Care, and MDM as documented in the resident's/non-physician practitioner's note, except where noted  All available labs and Radiology studies were reviewed  I was present for key portions of any procedure(s) performed by the resident/non-physician practitioner and I was immediately available to provide assistance  At this point I agree with the current assessment done in the Emergency Department  I have conducted an independent evaluation of this patient a history and physical is as follows:    Patient presents for further evaluation of a draining lump in her right groin  She states that she has had mass of some sort or another there since October  It appears to have started spontaneously, without injury or other symptoms until recently  When he got larger and appeared to be draining purulent material, she saw her PCP who started her on Keflex and Bactrim  The surrounding redness improved with this treatment but it continues to drain  No other complaints or systemic symptoms  No recent travel or sick contacts  ROS: Denies f/c, HA, CP, SOB, abdominal pain, n/v/d or dysuria  12 system ROS o/w negative  PE: NAD, appears comfortable, alert; HRR, no murmur; lungs CTA w/o w/r/r, POx 98% on RA (nl); skin on right groin near the mons has >1 cm area of superficial fluctuance w/minimal purulent drainage and no surrounding erythema; o/w p/w/d; multiple, small ecchymotic areas on her abdomen from insulin injections without appearance of concomitant cellulitis or abscess  DDx:  Resolving groin abscess, doubt lymph node involvement or tracking into the neurovascular bundle        A/P: Will I&D, continue antibiotics, recommend close f/u w/PCP            ED Course         Critical Care Time  Procedures

## 2022-01-22 NOTE — DISCHARGE INSTRUCTIONS
Please keep the area of the abscess clean and dry  You may wash the area gently with mild soap  If your packing falls out, this is fine  Otherwise, it will need to be removed by your PCP within the next week  Continue to take your antibiotics as prescribed  Return to the emergency department for any new or worsening symptoms including fever, spreading redness, or increased pain or drainage from the area

## 2022-01-22 NOTE — ED TRIAGE NOTES
Pt with an a right groin abscess  Pt was seen at PCP on the 18th and started on Keflex  PT reports that the coloring has come down, but it burns dimas when she is taking a shower   Pt reports that it is oozing "liquid"

## 2022-01-22 NOTE — ED PROVIDER NOTES
History  Chief Complaint   Patient presents with    Abscess     Patient is an 26-year-old female who presents for evaluation of a right groin abscess  Patient states that she 1st noticed a bump in the area in October  Patient states that at that time, it was not an issue for her  States that recently, the area has become more painful and has started to surface  Patient saw her PCP for evaluation on Wednesday  At that time, they were concern for some overlying skin infection, patient was given prescription for Bactrim and Keflex  Patient states that since starting antibiotics, skin redness has improved  However, patient still has pain in the area and the abscess is now starting to drain  Patient states that she has had some purulent material draining from the site  Patient denies any fevers or chills  Denies any warmth or redness in the area  Patient states that because the antibiotics, she has had some diarrhea, but denies any abdominal pain, nausea, or vomiting  History provided by:  Patient   used: No    Abscess  Location:  Pelvis  Pelvic abscess location:  Groin  Size:  2-3cm  Abscess quality: draining, fluctuance, painful and weeping    Abscess quality: no redness    Red streaking: no    Progression:  Improving  Associated symptoms: no fever, no nausea and no vomiting        Prior to Admission Medications   Prescriptions Last Dose Informant Patient Reported? Taking?    Calcium Carbonate-Vit D-Min (CALCIUM 1200) 7836-4946 MG-UNIT CHEW 1/21/2022 at Unknown time Self Yes Yes   Sig: Chew   Cholecalciferol (VITAMIN D3) 5000 UNIT/ML LIQD 1/21/2022 at Unknown time Self Yes Yes   Sig: Take by mouth   Insulin Pen Needle 32G X 4 MM MISC 1/22/2022 at Unknown time  No Yes   Sig: Use 4 (four) times a day   OneTouch Delica Lancets 71M MISC 1/22/2022 at Unknown time Self No Yes   Sig: by Other route 3 (three) times a day Check blood sugar 4 times per day   OneTouch Verio test strip 1/22/2022 at Unknown time  No Yes   Sig: TEST 3 TIMES DAILY AS INSTRUCTED   aspirin 81 mg chewable tablet 1/21/2022 at Unknown time Self Yes Yes   Sig: Chew 81 mg daily   brimonidine (ALPHAGAN P) 0 1 % 1/22/2022 at Unknown time Self Yes Yes   Sig: Apply to eye   cephalexin (KEFLEX) 500 mg capsule 1/21/2022 at Unknown time  No Yes   Sig: Take 1 capsule (500 mg total) by mouth every 8 (eight) hours for 10 days   insulin aspart (NovoLOG FlexPen) 100 UNIT/ML injection pen 1/22/2022 at Unknown time  No Yes   Sig: INJECT 10 UNITS WITH MEALS AND INSULIN SLIDING SCALE AS NEEDED   insulin detemir (Levemir FlexTouch) 100 Units/mL injection pen 1/22/2022 at Unknown time  No Yes   Sig: Use 37u am and 34u pm    latanoprost (XALATAN) 0 005 % ophthalmic solution 1/21/2022 at Unknown time Self Yes Yes   Sig: Apply to eye   losartan (COZAAR) 50 mg tablet 1/21/2022 at Unknown time  No Yes   Sig: TAKE 1 TABLET BY MOUTH EVERY DAY   simvastatin (ZOCOR) 80 mg tablet 1/21/2022 at Unknown time  No Yes   Sig: Take 1 tablet (80 mg total) by mouth daily   sulfamethoxazole-trimethoprim (BACTRIM DS) 800-160 mg per tablet 1/21/2022 at Unknown time  No Yes   Sig: Take 1 tablet by mouth every 12 (twelve) hours for 10 days      Facility-Administered Medications: None       Past Medical History:   Diagnosis Date    Carpal tunnel syndrome     Choledocholithiasis     resolved 3/6/2015    Diverticulosis     Inguinal hernia     Sciatica        Past Surgical History:   Procedure Laterality Date    ESOPHAGOGASTRODUODENOSCOPY      resolved 1/2002    HEMORRHOID SURGERY      resolved 2/1999    NEUROPLASTY / TRANSPOSITION MEDIAN NERVE AT CARPAL TUNNEL Right     last assessed 8/10/2012    RECTAL SURGERY         Family History   Problem Relation Age of Onset    Heart attack Mother         MI    Coronary artery disease Mother     Diabetes Mother         DM    Hypertension Mother     Stroke Mother         syndrome    Stroke Father         syndrome I have reviewed and agree with the history as documented  E-Cigarette/Vaping    E-Cigarette Use Never User      E-Cigarette/Vaping Substances     Social History     Tobacco Use    Smoking status: Never Smoker    Smokeless tobacco: Never Used   Vaping Use    Vaping Use: Never used   Substance Use Topics    Alcohol use: No    Drug use: No        Review of Systems   Constitutional: Negative for chills and fever  HENT: Negative  Respiratory: Negative  Cardiovascular: Negative  Gastrointestinal: Positive for diarrhea  Negative for abdominal pain, nausea and vomiting  Genitourinary: Negative  Musculoskeletal: Negative  Skin: Positive for wound  Neurological: Negative  All other systems reviewed and are negative  Physical Exam  ED Triage Vitals [01/22/22 0800]   Temperature Pulse Respirations Blood Pressure SpO2   (!) 96 3 °F (35 7 °C) 86 18 (!) 193/75 98 %      Temp Source Heart Rate Source Patient Position - Orthostatic VS BP Location FiO2 (%)   Oral Monitor -- Left arm --      Pain Score       No Pain             Orthostatic Vital Signs  Vitals:    01/22/22 0800   BP: (!) 193/75   Pulse: 86       Physical Exam  Vitals and nursing note reviewed  Constitutional:       General: She is awake  She is not in acute distress  Appearance: She is not toxic-appearing  HENT:      Head: Normocephalic and atraumatic  Eyes:      General: Vision grossly intact  Gaze aligned appropriately  Cardiovascular:      Rate and Rhythm: Normal rate and regular rhythm  Pulmonary:      Effort: Pulmonary effort is normal  No respiratory distress  Abdominal:      Palpations: Abdomen is soft  Tenderness: There is no abdominal tenderness  Musculoskeletal:      Cervical back: Full passive range of motion without pain and neck supple  Skin:     General: Skin is warm and dry  Neurological:      General: No focal deficit present        Mental Status: She is alert and oriented to person, place, and time  ED Medications  Medications   lidocaine-epinephrine (XYLOCAINE/EPINEPHRINE) 1 %-1:100,000 injection 5 mL (5 mL Infiltration Given by Other 1/22/22 5377)       Diagnostic Studies  Results Reviewed     None                 No orders to display         Procedures  Incision and drain    Date/Time: 1/22/2022 8:42 AM  Performed by: Yahaira Ya DO  Authorized by: Yahaira Ya DO   [place] Protocol:  Consent: Verbal consent obtained  Risks and benefits: risks, benefits and alternatives were discussed  Consent given by: patient  Required items: required blood products, implants, devices, and special equipment available  Patient identity confirmed: arm band      Patient location:  ED  Location:     Type:  Abscess    Size:  2-3cm    Location: right groin  Pre-procedure details:     Skin preparation:  Chloraprep  Anesthesia (see MAR for exact dosages): Anesthesia method:  Local infiltration    Local anesthetic:  Lidocaine 1% WITH epi  Procedure details:     Needle aspiration: no      Incision types:  Single straight    Scalpel blade:  11    Approach:  Open    Incision depth:  Subcutaneous    Wound management:  Probed and deloculated    Drainage characteristics: caseated material     Wound treatment:  Packing placed and wound left open    Packing materials:  1/4 in gauze    Amount 1/4":  2cm  Post-procedure details:     Patient tolerance of procedure: Tolerated well, no immediate complications          ED Course                                       MDM  Number of Diagnoses or Management Options  Abscess: established and worsening  Diagnosis management comments: 27-year-old female presents with right groin abscess  Patient has been on antibiotics since Wednesday, still having pain and drainage from the area  On exam, patient with a 2-3cm abscess in the right groin  Area not erythematous or warm to the touch, mildly tender  Area soft but not fluctuant   Small amount of purulent drainage noted  Area was numbed with lidocaine with epinephrine, and was incised with a scalpel  Caseated material removed and packing placed in the cavity  Patient told to continue with antibiotics and to follow up with PCP within the next week  Patient discharged in stable condition with strict ED return precautions  Patient Progress  Patient progress: stable      Disposition  Final diagnoses:   Abscess     Time reflects when diagnosis was documented in both MDM as applicable and the Disposition within this note     Time User Action Codes Description Comment    1/22/2022  9:09 AM Zee Schafer Add [L02 91] Abscess       ED Disposition     ED Disposition Condition Date/Time Comment    Discharge Stable Sat Jan 22, 2022  9:10 AM Jami Memos discharge to home/self care              Follow-up Information     Follow up With Specialties Details Why Contact Info Additional Information    Giovanni Bianchi MD Family Medicine Schedule an appointment as soon as possible for a visit in 1 week  [facility] [address] [facility] [address] [address]  [phone]       [address] [place] [address] Emergency Department Emergency Medicine Go to  If symptoms worsen [address]  958 [facility] 64 [facility] Emergency Department, [address], [facility] 108          Discharge Medication List as of 1/22/2022  9:12 AM      CONTINUE these medications which have NOT CHANGED    Details   aspirin 81 mg chewable tablet Chew 81 mg daily, Historical Med      brimonidine (ALPHAGAN P) 0 1 % Apply to eye, Starting Mon 3/7/2011, Historical Med      Calcium Carbonate-Vit D-Min (CALCIUM 1200) 2282-7570 MG-UNIT CHEW Chew, Starting Tue 11/14/2017, Historical Med      cephalexin (KEFLEX) 500 mg capsule Take 1 capsule (500 mg total) by mouth every 8 (eight) hours for 10 days, Starting Tue 1/18/2022, Until Fri 1/28/2022, Normal      Cholecalciferol (VITAMIN D3) 5000 UNIT/ML LIQD Take by mouth, Starting Wed 5/10/2017, Historical Med      insulin aspart (NovoLOG FlexPen) 100 UNIT/ML injection pen INJECT 10 UNITS WITH MEALS AND INSULIN SLIDING SCALE AS NEEDED, Normal      insulin detemir (Levemir FlexTouch) 100 Units/mL injection pen Use 37u am and 34u pm , No Print      Insulin Pen Needle 32G X 4 MM MISC Use 4 (four) times a day, Starting Tue 6/8/2021, Normal      latanoprost (XALATAN) 0 005 % ophthalmic solution Apply to eye, Starting Thu 4/18/2013, Historical Med      losartan (COZAAR) 50 mg tablet TAKE 1 TABLET BY MOUTH EVERY DAY, Normal      OneTouch Delica Lancets 54S MISC by Other route 3 (three) times a day Check blood sugar 4 times per day, Starting Thu 9/17/2020, Normal      OneTouch Verio test strip TEST 3 TIMES DAILY AS INSTRUCTED, Normal      simvastatin (ZOCOR) 80 mg tablet Take 1 tablet (80 mg total) by mouth daily, Starting Tue 6/8/2021, Normal      sulfamethoxazole-trimethoprim (BACTRIM DS) 800-160 mg per tablet Take 1 tablet by mouth every 12 (twelve) hours for 10 days, Starting Tue 1/18/2022, Until Fri 1/28/2022, Normal           No discharge procedures on file  PDMP Review     None           ED Provider  Attending physically available and evaluated Cat Campos I managed the patient along with the ED Attending      Electronically Signed by         Sindhu Cooper DO  01/22/22 1007

## 2022-01-23 DIAGNOSIS — E11.65 TYPE 2 DIABETES MELLITUS WITH HYPERGLYCEMIA, WITH LONG-TERM CURRENT USE OF INSULIN (HCC): ICD-10-CM

## 2022-01-23 DIAGNOSIS — Z79.4 TYPE 2 DIABETES MELLITUS WITH HYPERGLYCEMIA, WITH LONG-TERM CURRENT USE OF INSULIN (HCC): ICD-10-CM

## 2022-01-24 RX ORDER — BLOOD SUGAR DIAGNOSTIC
1 STRIP MISCELLANEOUS 3 TIMES DAILY
Qty: 300 STRIP | Refills: 0 | Status: SHIPPED | OUTPATIENT
Start: 2022-01-24 | End: 2022-03-27

## 2022-01-24 RX ORDER — BLOOD SUGAR DIAGNOSTIC
STRIP MISCELLANEOUS
Qty: 300 STRIP | Refills: 0 | Status: SHIPPED | OUTPATIENT
Start: 2022-01-24 | End: 2022-01-24 | Stop reason: SDUPTHER

## 2022-01-25 ENCOUNTER — RA CDI HCC (OUTPATIENT)
Dept: OTHER | Facility: HOSPITAL | Age: 87
End: 2022-01-25

## 2022-01-25 NOTE — PROGRESS NOTES
Miguel Ángel [facility] 75  coding opportunities       Chart reviewed, no opportunity found: CHART REVIEWED, NO OPPORTUNITY FOUND                        Patients insurance company: [facility] [address]  (Medicare Advantage and Commercial)

## 2022-01-26 DIAGNOSIS — I10 HYPERTENSION, UNSPECIFIED TYPE: ICD-10-CM

## 2022-01-26 RX ORDER — LOSARTAN POTASSIUM 50 MG/1
TABLET ORAL
Qty: 90 TABLET | Refills: 3 | Status: SHIPPED | OUTPATIENT
Start: 2022-01-26 | End: 2022-07-06

## 2022-01-28 ENCOUNTER — OFFICE VISIT (OUTPATIENT)
Dept: FAMILY MEDICINE CLINIC | Facility: CLINIC | Age: 87
End: 2022-01-28
Payer: COMMERCIAL

## 2022-01-28 VITALS
DIASTOLIC BLOOD PRESSURE: 50 MMHG | BODY MASS INDEX: 30.67 KG/M2 | TEMPERATURE: 98.5 F | WEIGHT: 156.2 LBS | HEIGHT: 60 IN | RESPIRATION RATE: 16 BRPM | OXYGEN SATURATION: 99 % | HEART RATE: 84 BPM | SYSTOLIC BLOOD PRESSURE: 170 MMHG

## 2022-01-28 DIAGNOSIS — L02.214 ABSCESS OF GROIN, RIGHT: Primary | ICD-10-CM

## 2022-01-28 DIAGNOSIS — I10 PRIMARY HYPERTENSION: ICD-10-CM

## 2022-01-28 PROCEDURE — 1124F ACP DISCUSS-NO DSCNMKR DOCD: CPT | Performed by: FAMILY MEDICINE

## 2022-01-28 PROCEDURE — 99213 OFFICE O/P EST LOW 20 MIN: CPT | Performed by: FAMILY MEDICINE

## 2022-01-28 NOTE — PROGRESS NOTES
Chief Complaint   Patient presents with    Follow-up     ED seen 01/18/22 for Abscess  Drain fell off yesterday  Recheck wound groin right side  Health Maintenance   Topic Date Due    DTaP,Tdap,and Td Vaccines (2 - Td or Tdap) 01/01/2010    PT PLAN OF CARE  10/08/2020    BMI: Followup Plan  06/08/2022    DM Eye Exam  05/24/2022    HEMOGLOBIN A1C  05/29/2022    Diabetic Foot Exam  06/08/2022    Fall Risk  12/08/2022    Depression Screening  12/08/2022    Medicare Annual Wellness Visit (AWV)  12/08/2022    BMI: Adult  01/28/2023    DXA SCAN  09/10/2024    Osteoporosis Screening  Completed    Pneumococcal Vaccine: 65+ Years  Completed    Influenza Vaccine  Completed    COVID-19 Vaccine  Completed    HIB Vaccine  Aged Out    Hepatitis B Vaccine  Aged Out    IPV Vaccine  Aged Out    Hepatitis A Vaccine  Aged Out    Meningococcal ACWY Vaccine  Aged Out    HPV Vaccine  Aged Out           Assessment/Plan:    Hypertension  BP elevated in office today  Pt denies symptoms like headache, vision change, SOB or chest pain  DASH diet  Check BP at home 2/day and call office if BP always >150/90  Finish antibiotics  BP elevated in office today  Pt denies symptoms like headache, vision change, SOB or chest pain  DASH diet  Check BP at home 2/day and call office if BP always >150/90  Diagnoses and all orders for this visit:    Abscess of groin, right  Comments:  Reviewed ER report  Primary hypertension          Subjective:      Patient ID: Iron Henderson is a 81 y o  female  HPI    Pt is here by herself  Had abscess of right groin for 1 week  Got keflex and bactrim but no help  Went to ER on 1/22/2022  Had I&D with packing  Pt feels better today  Pt states packing came out yesterday  Denies fever, SOB, CP, n/v/abd pain  Had some loose stool  Tomorrow will be the last day of antibiotics         The following portions of the patient's history were reviewed and updated as appropriate: allergies, current medications, past family history, past medical history, past social history, past surgical history and problem list     Review of Systems   Constitutional: Negative for appetite change, chills and fever  HENT: Negative for congestion, ear pain, sinus pain and sore throat  Eyes: Negative for discharge and itching  Respiratory: Negative for apnea, cough, chest tightness, shortness of breath and wheezing  Cardiovascular: Negative for chest pain, palpitations and leg swelling  Gastrointestinal: Negative for abdominal pain, anal bleeding, constipation, diarrhea, nausea and vomiting  Endocrine: Negative for cold intolerance, heat intolerance and polyuria  Genitourinary: Negative for difficulty urinating and dysuria  Musculoskeletal: Negative for arthralgias, back pain and myalgias  Skin: Positive for wound  Negative for rash  Neurological: Negative for dizziness and headaches  Psychiatric/Behavioral: Negative for agitation  Objective:      /50 (BP Location: Left arm, Patient Position: Sitting, Cuff Size: Adult)   Pulse 84   Temp 98 5 °F (36 9 °C) (Tympanic)   Resp 16   Ht 5' (1 524 m)   Wt 70 9 kg (156 lb 3 2 oz)   SpO2 99%   BMI 30 51 kg/m²          Physical Exam  Constitutional:       General: She is not in acute distress  Appearance: She is well-developed  HENT:      Head: Normocephalic  Eyes:      General:         Right eye: No discharge  Left eye: No discharge  Conjunctiva/sclera: Conjunctivae normal    Neck:      Thyroid: No thyromegaly  Cardiovascular:      Rate and Rhythm: Normal rate and regular rhythm  Heart sounds: Normal heart sounds  No murmur heard  No friction rub  No gallop  Pulmonary:      Effort: Pulmonary effort is normal  No respiratory distress  Breath sounds: Normal breath sounds  No wheezing or rales  Chest:      Chest wall: No tenderness     Abdominal:      General: Bowel sounds are normal       Palpations: Abdomen is soft  Musculoskeletal:         General: Normal range of motion  Cervical back: Normal range of motion  Lymphadenopathy:      Cervical: No cervical adenopathy  Skin:     Comments: Right groin wound almost healed  Mild erythema, no exudate   Neurological:      Mental Status: She is alert

## 2022-01-28 NOTE — ASSESSMENT & PLAN NOTE
BP elevated in office today  Pt denies symptoms like headache, vision change, SOB or chest pain  DASH diet  Check BP at home 2/day and call office if BP always >150/90

## 2022-02-28 ENCOUNTER — TELEPHONE (OUTPATIENT)
Dept: FAMILY MEDICINE CLINIC | Facility: CLINIC | Age: 87
End: 2022-02-28

## 2022-02-28 DIAGNOSIS — N18.30 TYPE 2 DIABETES MELLITUS WITH STAGE 3 CHRONIC KIDNEY DISEASE, WITH LONG-TERM CURRENT USE OF INSULIN (HCC): ICD-10-CM

## 2022-02-28 DIAGNOSIS — E11.22 TYPE 2 DIABETES MELLITUS WITH STAGE 3 CHRONIC KIDNEY DISEASE, WITH LONG-TERM CURRENT USE OF INSULIN (HCC): ICD-10-CM

## 2022-02-28 DIAGNOSIS — Z79.4 TYPE 2 DIABETES MELLITUS WITH STAGE 3 CHRONIC KIDNEY DISEASE, WITH LONG-TERM CURRENT USE OF INSULIN (HCC): ICD-10-CM

## 2022-02-28 RX ORDER — INSULIN DETEMIR 100 [IU]/ML
INJECTION, SOLUTION SUBCUTANEOUS
Qty: 75 ML | Refills: 1
Start: 2022-02-28 | End: 2022-03-04 | Stop reason: SDUPTHER

## 2022-03-04 RX ORDER — INSULIN DETEMIR 100 [IU]/ML
INJECTION, SOLUTION SUBCUTANEOUS
Qty: 75 ML | Refills: 1 | Status: SHIPPED | OUTPATIENT
Start: 2022-03-04

## 2022-03-27 DIAGNOSIS — Z79.4 TYPE 2 DIABETES MELLITUS WITH HYPERGLYCEMIA, WITH LONG-TERM CURRENT USE OF INSULIN (HCC): ICD-10-CM

## 2022-03-27 DIAGNOSIS — E11.65 TYPE 2 DIABETES MELLITUS WITH HYPERGLYCEMIA, WITH LONG-TERM CURRENT USE OF INSULIN (HCC): ICD-10-CM

## 2022-03-27 RX ORDER — BLOOD SUGAR DIAGNOSTIC
1 STRIP MISCELLANEOUS 3 TIMES DAILY
Qty: 200 STRIP | Refills: 1 | Status: SHIPPED | OUTPATIENT
Start: 2022-03-27

## 2022-05-31 ENCOUNTER — APPOINTMENT (OUTPATIENT)
Dept: LAB | Facility: CLINIC | Age: 87
End: 2022-05-31
Payer: COMMERCIAL

## 2022-05-31 DIAGNOSIS — E11.22 TYPE 2 DIABETES MELLITUS WITH STAGE 3A CHRONIC KIDNEY DISEASE, WITH LONG-TERM CURRENT USE OF INSULIN (HCC): ICD-10-CM

## 2022-05-31 DIAGNOSIS — Z79.4 TYPE 2 DIABETES MELLITUS WITH STAGE 3A CHRONIC KIDNEY DISEASE, WITH LONG-TERM CURRENT USE OF INSULIN (HCC): ICD-10-CM

## 2022-05-31 DIAGNOSIS — E78.5 HYPERLIPIDEMIA, UNSPECIFIED HYPERLIPIDEMIA TYPE: ICD-10-CM

## 2022-05-31 DIAGNOSIS — N18.31 TYPE 2 DIABETES MELLITUS WITH STAGE 3A CHRONIC KIDNEY DISEASE, WITH LONG-TERM CURRENT USE OF INSULIN (HCC): ICD-10-CM

## 2022-05-31 DIAGNOSIS — I10 PRIMARY HYPERTENSION: ICD-10-CM

## 2022-05-31 LAB
ALBUMIN SERPL BCP-MCNC: 3.7 G/DL (ref 3.5–5)
ALP SERPL-CCNC: 76 U/L (ref 46–116)
ALT SERPL W P-5'-P-CCNC: 20 U/L (ref 12–78)
ANION GAP SERPL CALCULATED.3IONS-SCNC: 5 MMOL/L (ref 4–13)
AST SERPL W P-5'-P-CCNC: 25 U/L (ref 5–45)
BILIRUB SERPL-MCNC: 0.5 MG/DL (ref 0.2–1)
BUN SERPL-MCNC: 24 MG/DL (ref 5–25)
CALCIUM SERPL-MCNC: 9.8 MG/DL (ref 8.3–10.1)
CHLORIDE SERPL-SCNC: 107 MMOL/L (ref 100–108)
CHOLEST SERPL-MCNC: 160 MG/DL
CO2 SERPL-SCNC: 26 MMOL/L (ref 21–32)
CREAT SERPL-MCNC: 1.06 MG/DL (ref 0.6–1.3)
EST. AVERAGE GLUCOSE BLD GHB EST-MCNC: 140 MG/DL
GFR SERPL CREATININE-BSD FRML MDRD: 47 ML/MIN/1.73SQ M
GLUCOSE P FAST SERPL-MCNC: 107 MG/DL (ref 65–99)
HBA1C MFR BLD: 6.5 %
HDLC SERPL-MCNC: 38 MG/DL
LDLC SERPL CALC-MCNC: 84 MG/DL (ref 0–100)
NONHDLC SERPL-MCNC: 122 MG/DL
POTASSIUM SERPL-SCNC: 4.3 MMOL/L (ref 3.5–5.3)
PROT SERPL-MCNC: 7.3 G/DL (ref 6.4–8.2)
SODIUM SERPL-SCNC: 138 MMOL/L (ref 136–145)
TRIGL SERPL-MCNC: 189 MG/DL

## 2022-05-31 PROCEDURE — 80053 COMPREHEN METABOLIC PANEL: CPT

## 2022-05-31 PROCEDURE — 36415 COLL VENOUS BLD VENIPUNCTURE: CPT

## 2022-05-31 PROCEDURE — 83036 HEMOGLOBIN GLYCOSYLATED A1C: CPT

## 2022-05-31 PROCEDURE — 80061 LIPID PANEL: CPT

## 2022-06-08 ENCOUNTER — OFFICE VISIT (OUTPATIENT)
Dept: FAMILY MEDICINE CLINIC | Facility: CLINIC | Age: 87
End: 2022-06-08
Payer: COMMERCIAL

## 2022-06-08 VITALS
BODY MASS INDEX: 30.55 KG/M2 | SYSTOLIC BLOOD PRESSURE: 176 MMHG | RESPIRATION RATE: 20 BRPM | OXYGEN SATURATION: 96 % | HEART RATE: 84 BPM | TEMPERATURE: 98.8 F | DIASTOLIC BLOOD PRESSURE: 66 MMHG | WEIGHT: 155.6 LBS | HEIGHT: 60 IN

## 2022-06-08 DIAGNOSIS — E78.5 HYPERLIPIDEMIA, UNSPECIFIED HYPERLIPIDEMIA TYPE: ICD-10-CM

## 2022-06-08 DIAGNOSIS — E11.22 TYPE 2 DIABETES MELLITUS WITH STAGE 3A CHRONIC KIDNEY DISEASE, WITH LONG-TERM CURRENT USE OF INSULIN (HCC): ICD-10-CM

## 2022-06-08 DIAGNOSIS — H40.9 GLAUCOMA OF BOTH EYES, UNSPECIFIED GLAUCOMA TYPE: ICD-10-CM

## 2022-06-08 DIAGNOSIS — I10 PRIMARY HYPERTENSION: Primary | ICD-10-CM

## 2022-06-08 DIAGNOSIS — Z79.4 TYPE 2 DIABETES MELLITUS WITH STAGE 3A CHRONIC KIDNEY DISEASE, WITH LONG-TERM CURRENT USE OF INSULIN (HCC): ICD-10-CM

## 2022-06-08 DIAGNOSIS — N18.31 TYPE 2 DIABETES MELLITUS WITH STAGE 3A CHRONIC KIDNEY DISEASE, WITH LONG-TERM CURRENT USE OF INSULIN (HCC): ICD-10-CM

## 2022-06-08 PROCEDURE — 3077F SYST BP >= 140 MM HG: CPT | Performed by: FAMILY MEDICINE

## 2022-06-08 PROCEDURE — 1160F RVW MEDS BY RX/DR IN RCRD: CPT | Performed by: FAMILY MEDICINE

## 2022-06-08 PROCEDURE — 3725F SCREEN DEPRESSION PERFORMED: CPT | Performed by: FAMILY MEDICINE

## 2022-06-08 PROCEDURE — 3078F DIAST BP <80 MM HG: CPT | Performed by: FAMILY MEDICINE

## 2022-06-08 PROCEDURE — 1036F TOBACCO NON-USER: CPT | Performed by: FAMILY MEDICINE

## 2022-06-08 PROCEDURE — 3288F FALL RISK ASSESSMENT DOCD: CPT | Performed by: FAMILY MEDICINE

## 2022-06-08 PROCEDURE — 99214 OFFICE O/P EST MOD 30 MIN: CPT | Performed by: FAMILY MEDICINE

## 2022-06-08 PROCEDURE — 1101F PT FALLS ASSESS-DOCD LE1/YR: CPT | Performed by: FAMILY MEDICINE

## 2022-06-08 RX ORDER — LOSARTAN POTASSIUM 25 MG/1
25 TABLET ORAL DAILY
Qty: 90 TABLET | Refills: 0 | Status: SHIPPED | OUTPATIENT
Start: 2022-06-08 | End: 2022-07-06

## 2022-06-08 NOTE — ASSESSMENT & PLAN NOTE
BP elevated in office today  Pt denies symptoms like headache, vision change, SOB or chest pain  DASH diet  Increase losartan to 75mg QD  Check BP at home 2/day and call office if BP always >140/90

## 2022-06-08 NOTE — PROGRESS NOTES
Chief Complaint   Patient presents with    Follow-up     6 months  Health Maintenance   Topic Date Due    DTaP,Tdap,and Td Vaccines (2 - Td or Tdap) 01/01/2010    PT PLAN OF CARE  10/08/2020    DM Eye Exam  05/24/2022    BMI: Followup Plan  06/08/2022    Diabetic Foot Exam  06/08/2022    HEMOGLOBIN A1C  11/30/2022    Fall Risk  12/08/2022    Medicare Annual Wellness Visit (AWV)  12/08/2022    Depression Screening  06/08/2023    BMI: Adult  06/08/2023    DXA SCAN  09/10/2024    Pneumococcal Vaccine: 65+ Years  Completed    Influenza Vaccine  Completed    COVID-19 Vaccine  Completed    HIB Vaccine  Aged Out    Hepatitis B Vaccine  Aged Out    IPV Vaccine  Aged Out    Hepatitis A Vaccine  Aged Out    Meningococcal ACWY Vaccine  Aged Out    HPV Vaccine  Aged Out     BMI Counseling: Body mass index is 30 39 kg/m²  Follow-up plan was not completed due to elderly patient (73 years old) where weight reduction/weight gain would complicate underlying health condition such as: illness or physical disability  Depression Screening and Follow-up Plan: Patient was screened for depression during today's encounter  They screened negative with a PHQ-2 score of 0  Assessment/Plan:    Hypertension  BP elevated in office today  Pt denies symptoms like headache, vision change, SOB or chest pain  DASH diet  Increase losartan to 75mg QD  Check BP at home 2/day and call office if BP always >140/90  Type 2 diabetes mellitus with stage 3 chronic kidney disease, with long-term current use of insulin (HCC)    Lab Results   Component Value Date    HGBA1C 6 5 (H) 05/31/2022     Controlled  Low carb diet  Continue levemir and novolog  Hyperlipidemia  5/2022 Lipid ok  Low fat diet  Continue simvastatin 80mg qhs  Glaucoma  FU ophthalmology regularly  Reviewed lab in 5/2022  Lipid 160/189/38/84 ok  CMP ok  hgA1C 6 5 controlled    Got flu shot yearly  Got covid19 vaccines and boosters     Got prevnar 4/2015  Got pneumovax 2016  Got shingrix 2019    Dexa 9/2021 T -1 2, stable  Pt is on calcium/vitD and exercise regularly  Fall precautions  RTO in 6 months with labs         Diagnoses and all orders for this visit:    Primary hypertension  -     losartan (COZAAR) 25 mg tablet; Take 1 tablet (25 mg total) by mouth daily Take 1 tab of 50mg and 1 tab of 25mg daily  Total 75mg daily  Type 2 diabetes mellitus with stage 3a chronic kidney disease, with long-term current use of insulin (HCC)    Hyperlipidemia, unspecified hyperlipidemia type    Glaucoma of both eyes, unspecified glaucoma type          Subjective:      Patient ID: Cathy Hewitt is a 81 y o  female  HPI    Pt is here by herself       DM---5/2022 hgA1C 6 5 controlled  Pt states she is on levemir 37u am and 34u pm    Usually novolog 9u/10u/10u with meals     Denies hypoglycemia    Denies neuropathy    FU opthalmology Dr Alberto Kim  Right eye cannot see  FU retinal specialist also per pt  FU podiatry Dr Josafat Georges Q 3-4 months      HTN----She is on losartan 50mg QD  Denies SOB, CP, headache etc    Does not check BP at home 136-197/55-88  Half of reading are >140/90       Hyperlipidemia----she is on simvastatin 80mg qhs  Denies side effects       Pt lives by herself  Does all ADL's  Still driving  Had children and great-grandson living close to her  Denies recent falls  Denies depression        The following portions of the patient's history were reviewed and updated as appropriate: allergies, current medications, past family history, past medical history, past social history, past surgical history and problem list     Review of Systems   Constitutional: Negative for appetite change, chills and fever  HENT: Negative for congestion, ear pain, sinus pain and sore throat  Eyes: Negative for discharge and itching  Respiratory: Negative for apnea, cough, chest tightness, shortness of breath and wheezing  Cardiovascular: Negative for chest pain, palpitations and leg swelling  Gastrointestinal: Negative for abdominal pain, anal bleeding, constipation, diarrhea, nausea and vomiting  Endocrine: Negative for cold intolerance, heat intolerance and polyuria  Genitourinary: Negative for difficulty urinating and dysuria  Musculoskeletal: Negative for arthralgias, back pain and myalgias  Skin: Negative for rash  Neurological: Negative for dizziness and headaches  Psychiatric/Behavioral: Negative for agitation  Objective:      BP (!) 176/66 (BP Location: Left arm, Patient Position: Sitting, Cuff Size: Adult)   Pulse 84   Temp 98 8 °F (37 1 °C) (Tympanic)   Resp 20   Ht 5' (1 524 m)   Wt 70 6 kg (155 lb 9 6 oz)   SpO2 96%   BMI 30 39 kg/m²          Physical Exam  Constitutional:       General: She is not in acute distress  Appearance: She is well-developed  HENT:      Head: Normocephalic  Eyes:      General:         Right eye: No discharge  Left eye: No discharge  Conjunctiva/sclera: Conjunctivae normal    Neck:      Thyroid: No thyromegaly  Cardiovascular:      Rate and Rhythm: Normal rate and regular rhythm  Heart sounds: Normal heart sounds  No murmur heard  No friction rub  No gallop  Pulmonary:      Effort: Pulmonary effort is normal  No respiratory distress  Breath sounds: Normal breath sounds  No wheezing or rales  Chest:      Chest wall: No tenderness  Abdominal:      General: Bowel sounds are normal  There is no distension  Palpations: Abdomen is soft  There is no mass  Tenderness: There is no abdominal tenderness  There is no guarding or rebound  Musculoskeletal:         General: No tenderness or deformity  Normal range of motion  Cervical back: Normal range of motion  Lymphadenopathy:      Cervical: No cervical adenopathy  Neurological:      Mental Status: She is alert

## 2022-06-08 NOTE — ASSESSMENT & PLAN NOTE
Lab Results   Component Value Date    HGBA1C 6 5 (H) 05/31/2022     Controlled  Low carb diet  Continue levemir and novolog

## 2022-07-06 ENCOUNTER — OFFICE VISIT (OUTPATIENT)
Dept: FAMILY MEDICINE CLINIC | Facility: CLINIC | Age: 87
End: 2022-07-06
Payer: COMMERCIAL

## 2022-07-06 VITALS
DIASTOLIC BLOOD PRESSURE: 65 MMHG | WEIGHT: 153 LBS | SYSTOLIC BLOOD PRESSURE: 160 MMHG | HEIGHT: 60 IN | RESPIRATION RATE: 16 BRPM | HEART RATE: 92 BPM | BODY MASS INDEX: 30.04 KG/M2 | TEMPERATURE: 99.1 F

## 2022-07-06 DIAGNOSIS — I10 PRIMARY HYPERTENSION: Primary | ICD-10-CM

## 2022-07-06 PROCEDURE — 3077F SYST BP >= 140 MM HG: CPT | Performed by: FAMILY MEDICINE

## 2022-07-06 PROCEDURE — 3078F DIAST BP <80 MM HG: CPT | Performed by: FAMILY MEDICINE

## 2022-07-06 PROCEDURE — 1160F RVW MEDS BY RX/DR IN RCRD: CPT | Performed by: FAMILY MEDICINE

## 2022-07-06 PROCEDURE — 99214 OFFICE O/P EST MOD 30 MIN: CPT | Performed by: FAMILY MEDICINE

## 2022-07-06 PROCEDURE — 93000 ELECTROCARDIOGRAM COMPLETE: CPT | Performed by: FAMILY MEDICINE

## 2022-07-06 RX ORDER — LOSARTAN POTASSIUM 100 MG/1
100 TABLET ORAL DAILY
Qty: 30 TABLET | Refills: 5 | Status: SHIPPED | OUTPATIENT
Start: 2022-07-06 | End: 2022-07-16

## 2022-07-06 NOTE — PROGRESS NOTES
Chief Complaint   Patient presents with    Follow-up     4 weeks for BP      Health Maintenance   Topic Date Due    DTaP,Tdap,and Td Vaccines (2 - Td or Tdap) 01/01/2010    PT PLAN OF CARE  10/08/2020    DM Eye Exam  05/24/2022    Diabetic Foot Exam  06/08/2022    Influenza Vaccine (1) 09/01/2022    HEMOGLOBIN A1C  11/30/2022    Medicare Annual Wellness Visit (AWV)  12/08/2022    Fall Risk  06/08/2023    Depression Screening  06/08/2023    BMI: Adult  06/08/2023    DXA SCAN  09/10/2024    Pneumococcal Vaccine: 65+ Years  Completed    COVID-19 Vaccine  Completed    HIB Vaccine  Aged Out    Hepatitis B Vaccine  Aged Out    IPV Vaccine  Aged Out    Hepatitis A Vaccine  Aged Out    Meningococcal ACWY Vaccine  Aged Out    HPV Vaccine  Aged Out       Assessment/Plan:    Hypertension  BP elevated in office today  Pt denies symptoms like headache, vision change, SOB or chest pain  DASH diet  Increase losartan to 100mg QD  Check BP at home 2/day and call office if BP always >140/90  EKG today showed sinus rhythm, no acute ST-T change  No previous EKG  Diagnoses and all orders for this visit:    Primary hypertension  -     losartan (COZAAR) 100 MG tablet; Take 1 tablet (100 mg total) by mouth daily  -     POCT ECG          Subjective:      Patient ID: Yovani Aponte is a 81 y o  female  HPI    Pt is here by herself  HTN----She is on losartan 75mg QD for 1 month  Denies SOB, CP, headache etc    Check BP at home  Half of reading are >140/90  Pt states she ate lunch at [facility], maybe the food there are salty  The following portions of the patient's history were reviewed and updated as appropriate: allergies, current medications, past family history, past medical history, past social history, past surgical history and problem list     Review of Systems   Constitutional: Negative for appetite change, chills and fever     HENT: Negative for congestion, ear pain, sinus pain and sore throat  Eyes: Negative for discharge and itching  Respiratory: Negative for apnea, cough, chest tightness, shortness of breath and wheezing  Cardiovascular: Negative for chest pain, palpitations and leg swelling  Gastrointestinal: Negative for abdominal pain, anal bleeding, constipation, diarrhea, nausea and vomiting  Endocrine: Negative for cold intolerance, heat intolerance and polyuria  Genitourinary: Negative for difficulty urinating and dysuria  Musculoskeletal: Negative for arthralgias, back pain and myalgias  Skin: Negative for rash  Neurological: Negative for dizziness and headaches  Psychiatric/Behavioral: Negative for agitation  Objective:      /65   Pulse 92   Temp 99 1 °F (37 3 °C) (Tympanic)   Resp 16   Ht 5' (1 524 m)   Wt 69 4 kg (153 lb)   BMI 29 88 kg/m²          Physical Exam  Constitutional:       General: She is not in acute distress  Appearance: She is well-developed  HENT:      Head: Normocephalic  Eyes:      General:         Right eye: No discharge  Left eye: No discharge  Conjunctiva/sclera: Conjunctivae normal    Neck:      Thyroid: No thyromegaly  Cardiovascular:      Rate and Rhythm: Normal rate and regular rhythm  Heart sounds: Normal heart sounds  No murmur heard  No friction rub  No gallop  Pulmonary:      Effort: Pulmonary effort is normal  No respiratory distress  Breath sounds: Normal breath sounds  No wheezing or rales  Chest:      Chest wall: No tenderness  Abdominal:      General: Bowel sounds are normal  There is no distension  Palpations: Abdomen is soft  There is no mass  Tenderness: There is no abdominal tenderness  There is no guarding or rebound  Musculoskeletal:         General: Normal range of motion  Cervical back: Normal range of motion  Right lower leg: No edema  Left lower leg: No edema  Lymphadenopathy:      Cervical: No cervical adenopathy  Neurological:      Mental Status: She is alert

## 2022-07-06 NOTE — ASSESSMENT & PLAN NOTE
BP elevated in office today  Pt denies symptoms like headache, vision change, SOB or chest pain  DASH diet  Increase losartan to 100mg QD  Check BP at home 2/day and call office if BP always >140/90

## 2022-07-16 DIAGNOSIS — I10 PRIMARY HYPERTENSION: ICD-10-CM

## 2022-07-16 RX ORDER — LOSARTAN POTASSIUM 100 MG/1
TABLET ORAL
Qty: 90 TABLET | Refills: 2 | Status: SHIPPED | OUTPATIENT
Start: 2022-07-16

## 2022-07-26 DIAGNOSIS — E78.2 MIXED HYPERLIPIDEMIA: ICD-10-CM

## 2022-07-26 RX ORDER — SIMVASTATIN 80 MG
TABLET ORAL
Qty: 90 TABLET | Refills: 3 | Status: SHIPPED | OUTPATIENT
Start: 2022-07-26

## 2022-08-04 ENCOUNTER — OFFICE VISIT (OUTPATIENT)
Dept: FAMILY MEDICINE CLINIC | Facility: CLINIC | Age: 87
End: 2022-08-04
Payer: COMMERCIAL

## 2022-08-04 VITALS
TEMPERATURE: 98.4 F | BODY MASS INDEX: 30.12 KG/M2 | HEIGHT: 60 IN | HEART RATE: 84 BPM | SYSTOLIC BLOOD PRESSURE: 136 MMHG | DIASTOLIC BLOOD PRESSURE: 60 MMHG | OXYGEN SATURATION: 97 % | WEIGHT: 153.4 LBS

## 2022-08-04 DIAGNOSIS — N18.31 TYPE 2 DIABETES MELLITUS WITH STAGE 3A CHRONIC KIDNEY DISEASE, WITH LONG-TERM CURRENT USE OF INSULIN (HCC): ICD-10-CM

## 2022-08-04 DIAGNOSIS — E11.22 TYPE 2 DIABETES MELLITUS WITH STAGE 3A CHRONIC KIDNEY DISEASE, WITH LONG-TERM CURRENT USE OF INSULIN (HCC): ICD-10-CM

## 2022-08-04 DIAGNOSIS — Z79.4 TYPE 2 DIABETES MELLITUS WITH STAGE 3A CHRONIC KIDNEY DISEASE, WITH LONG-TERM CURRENT USE OF INSULIN (HCC): ICD-10-CM

## 2022-08-04 DIAGNOSIS — E78.5 HYPERLIPIDEMIA, UNSPECIFIED HYPERLIPIDEMIA TYPE: ICD-10-CM

## 2022-08-04 DIAGNOSIS — I10 PRIMARY HYPERTENSION: Primary | ICD-10-CM

## 2022-08-04 PROCEDURE — 99213 OFFICE O/P EST LOW 20 MIN: CPT | Performed by: FAMILY MEDICINE

## 2022-08-04 PROCEDURE — 1160F RVW MEDS BY RX/DR IN RCRD: CPT | Performed by: FAMILY MEDICINE

## 2022-08-04 PROCEDURE — 3078F DIAST BP <80 MM HG: CPT | Performed by: FAMILY MEDICINE

## 2022-08-04 PROCEDURE — 3075F SYST BP GE 130 - 139MM HG: CPT | Performed by: FAMILY MEDICINE

## 2022-08-04 NOTE — PROGRESS NOTES
Chief Complaint   Patient presents with    Follow-up     1 month  Health Maintenance   Topic Date Due    PT PLAN OF CARE  10/08/2020    DM Eye Exam  05/24/2022    Diabetic Foot Exam  06/08/2022    Influenza Vaccine (1) 09/01/2022    HEMOGLOBIN A1C  11/30/2022    Medicare Annual Wellness Visit (AWV)  12/08/2022    Fall Risk  06/08/2023    Depression Screening  06/08/2023    BMI: Adult  08/04/2023    DXA SCAN  09/10/2024    Pneumococcal Vaccine: 65+ Years  Completed    COVID-19 Vaccine  Completed    HIB Vaccine  Aged Out    Hepatitis B Vaccine  Aged Out    IPV Vaccine  Aged Out    Hepatitis A Vaccine  Aged Out    Meningococcal ACWY Vaccine  Aged Out    HPV Vaccine  Aged Out           Assessment/Plan:    Hypertension  DASH diet  Continue losartan 100mg QD  Diagnoses and all orders for this visit:    Primary hypertension  -     CBC; Future  -     Comprehensive metabolic panel; Future  -     Hemoglobin A1C; Future  -     Lipid panel; Future  -     Microalbumin / creatinine urine ratio; Future    Type 2 diabetes mellitus with stage 3a chronic kidney disease, with long-term current use of insulin (HCC)  -     CBC; Future  -     Comprehensive metabolic panel; Future  -     Hemoglobin A1C; Future  -     Lipid panel; Future  -     Microalbumin / creatinine urine ratio; Future    Hyperlipidemia, unspecified hyperlipidemia type  -     CBC; Future  -     Comprehensive metabolic panel; Future  -     Hemoglobin A1C; Future  -     Lipid panel; Future  -     Microalbumin / creatinine urine ratio; Future          Subjective:      Patient ID: Yovani Aponte is a 81 y o  female  HPI    Pt is here by herself       HTN----She is on losartan 100mg QD for 1 month  Denies SOB, CP, headache etc    BP today good in office  Pt brought her BP machine here today and it showed higher numbers  Advised pt to get a new BP machine         The following portions of the patient's history were reviewed and updated as appropriate: allergies, current medications, past family history, past medical history, past social history, past surgical history and problem list     Review of Systems   Constitutional: Negative for appetite change, chills and fever  HENT: Negative for congestion, ear pain, sinus pain and sore throat  Eyes: Negative for discharge and itching  Respiratory: Negative for apnea, cough, chest tightness, shortness of breath and wheezing  Cardiovascular: Negative for chest pain, palpitations and leg swelling  Gastrointestinal: Negative for abdominal pain, anal bleeding, constipation, diarrhea, nausea and vomiting  Endocrine: Negative for cold intolerance, heat intolerance and polyuria  Genitourinary: Negative for difficulty urinating and dysuria  Musculoskeletal: Negative for arthralgias, back pain and myalgias  Skin: Negative for rash  Neurological: Negative for dizziness and headaches  Psychiatric/Behavioral: Negative for agitation  Objective:      /60 (BP Location: Left arm, Patient Position: Sitting, Cuff Size: Adult) Comment: Home /74  Pulse 84   Temp 98 4 °F (36 9 °C) (Tympanic)   Ht 5' (1 524 m)   Wt 69 6 kg (153 lb 6 4 oz)   SpO2 97%   BMI 29 96 kg/m²          Physical Exam  Constitutional:       General: She is not in acute distress  Appearance: She is well-developed  HENT:      Head: Normocephalic  Eyes:      General:         Right eye: No discharge  Left eye: No discharge  Conjunctiva/sclera: Conjunctivae normal    Neck:      Thyroid: No thyromegaly  Cardiovascular:      Rate and Rhythm: Normal rate and regular rhythm  Pulses: no weak pulses          Dorsalis pedis pulses are 2+ on the right side and 2+ on the left side  Heart sounds: Normal heart sounds  No murmur heard  No friction rub  No gallop  Pulmonary:      Effort: Pulmonary effort is normal  No respiratory distress  Breath sounds: Normal breath sounds   No wheezing or rales  Chest:      Chest wall: No tenderness  Abdominal:      General: Bowel sounds are normal  There is no distension  Palpations: Abdomen is soft  There is no mass  Tenderness: There is no abdominal tenderness  There is no guarding or rebound  Musculoskeletal:         General: No tenderness or deformity  Normal range of motion  Cervical back: Normal range of motion  Feet:      Right foot:      Skin integrity: No ulcer, skin breakdown, erythema, warmth, callus or dry skin  Left foot:      Skin integrity: No ulcer, skin breakdown, erythema, warmth, callus or dry skin  Lymphadenopathy:      Cervical: No cervical adenopathy  Neurological:      Mental Status: She is alert  Patient's shoes and socks removed  Right Foot/Ankle   Right Foot Inspection  Skin Exam: skin normal and skin intact  No dry skin, no warmth, no callus, no erythema, no maceration, no abnormal color, no pre-ulcer, no ulcer and no callus  Sensory   Monofilament testing: intact    Vascular  The right DP pulse is 2+  Left Foot/Ankle  Left Foot Inspection  Skin Exam: skin normal and skin intact  No dry skin, no warmth, no erythema, no maceration, normal color, no pre-ulcer, no ulcer and no callus  Sensory   Monofilament testing: intact    Vascular  The left DP pulse is 2+       Assign Risk Category  No deformity present  No loss of protective sensation  No weak pulses  Risk: 0

## 2022-08-17 DIAGNOSIS — E11.65 TYPE 2 DIABETES MELLITUS WITH HYPERGLYCEMIA, WITH LONG-TERM CURRENT USE OF INSULIN (HCC): ICD-10-CM

## 2022-08-17 DIAGNOSIS — Z79.4 TYPE 2 DIABETES MELLITUS WITH HYPERGLYCEMIA, WITH LONG-TERM CURRENT USE OF INSULIN (HCC): ICD-10-CM

## 2022-08-17 RX ORDER — BLOOD SUGAR DIAGNOSTIC
1 STRIP MISCELLANEOUS 3 TIMES DAILY
Qty: 300 STRIP | Refills: 1 | Status: SHIPPED | OUTPATIENT
Start: 2022-08-17

## 2022-09-02 DIAGNOSIS — E11.8 TYPE 2 DIABETES MELLITUS WITH UNSPECIFIED COMPLICATIONS (HCC): ICD-10-CM

## 2022-09-02 RX ORDER — INSULIN ASPART 100 [IU]/ML
INJECTION, SOLUTION INTRAVENOUS; SUBCUTANEOUS
Qty: 15 ML | Refills: 1 | Status: SHIPPED | OUTPATIENT
Start: 2022-09-02 | End: 2022-09-06 | Stop reason: SDUPTHER

## 2022-09-06 DIAGNOSIS — E11.8 TYPE 2 DIABETES MELLITUS WITH UNSPECIFIED COMPLICATIONS (HCC): ICD-10-CM

## 2022-09-06 RX ORDER — INSULIN ASPART 100 [IU]/ML
INJECTION, SOLUTION INTRAVENOUS; SUBCUTANEOUS
Qty: 15 ML | Refills: 1 | Status: SHIPPED | OUTPATIENT
Start: 2022-09-06 | End: 2022-09-06 | Stop reason: SDUPTHER

## 2022-09-06 RX ORDER — INSULIN ASPART 100 [IU]/ML
INJECTION, SOLUTION INTRAVENOUS; SUBCUTANEOUS
Qty: 90 ML | Refills: 1 | Status: SHIPPED | OUTPATIENT
Start: 2022-09-06

## 2022-09-06 NOTE — TELEPHONE ENCOUNTER
Patient ([phone]) requested refill of insulin aspart (NovaLog FlexPen) 100 Unit/ML injection pen  Patient states she usually receives 6 boxes but only received one  Patient needs 90 day supply sent to pharmacy

## 2022-10-05 DIAGNOSIS — E11.22 TYPE 2 DIABETES MELLITUS WITH STAGE 3 CHRONIC KIDNEY DISEASE, WITH LONG-TERM CURRENT USE OF INSULIN (HCC): ICD-10-CM

## 2022-10-05 DIAGNOSIS — N18.30 TYPE 2 DIABETES MELLITUS WITH STAGE 3 CHRONIC KIDNEY DISEASE, WITH LONG-TERM CURRENT USE OF INSULIN (HCC): ICD-10-CM

## 2022-10-05 DIAGNOSIS — Z79.4 TYPE 2 DIABETES MELLITUS WITH STAGE 3 CHRONIC KIDNEY DISEASE, WITH LONG-TERM CURRENT USE OF INSULIN (HCC): ICD-10-CM

## 2022-10-05 RX ORDER — INSULIN DETEMIR 100 [IU]/ML
INJECTION, SOLUTION SUBCUTANEOUS
Qty: 75 ML | Refills: 1 | Status: SHIPPED | OUTPATIENT
Start: 2022-10-05

## 2022-11-14 LAB
LEFT EYE DIABETIC RETINOPATHY: POSITIVE
RIGHT EYE DIABETIC RETINOPATHY: POSITIVE
SEVERITY (EYE EXAM): NORMAL

## 2022-11-15 ENCOUNTER — TELEPHONE (OUTPATIENT)
Dept: FAMILY MEDICINE CLINIC | Facility: CLINIC | Age: 87
End: 2022-11-15

## 2022-11-15 DIAGNOSIS — Z79.4 TYPE 2 DIABETES MELLITUS WITH STAGE 3A CHRONIC KIDNEY DISEASE, WITH LONG-TERM CURRENT USE OF INSULIN (HCC): Primary | ICD-10-CM

## 2022-11-15 DIAGNOSIS — I10 PRIMARY HYPERTENSION: ICD-10-CM

## 2022-11-15 DIAGNOSIS — E78.5 HYPERLIPIDEMIA, UNSPECIFIED HYPERLIPIDEMIA TYPE: ICD-10-CM

## 2022-11-15 DIAGNOSIS — E11.22 TYPE 2 DIABETES MELLITUS WITH STAGE 3A CHRONIC KIDNEY DISEASE, WITH LONG-TERM CURRENT USE OF INSULIN (HCC): Primary | ICD-10-CM

## 2022-11-15 DIAGNOSIS — N18.31 TYPE 2 DIABETES MELLITUS WITH STAGE 3A CHRONIC KIDNEY DISEASE, WITH LONG-TERM CURRENT USE OF INSULIN (HCC): Primary | ICD-10-CM

## 2022-11-15 NOTE — TELEPHONE ENCOUNTER
Patient ([phone]) called to request lab work be updated to have done prior to AWV on 12/13/22  Patient was scheduled for February but ased to have AWV done before January 1st as insurance will still be change to [employer] In the new year  Asked if scripts can still be used or if they need to be updated  If so, requests call back to notify so patient can arrange to receive new scripts before visit

## 2022-11-22 ENCOUNTER — LAB (OUTPATIENT)
Dept: LAB | Facility: CLINIC | Age: 87
End: 2022-11-22

## 2022-11-22 DIAGNOSIS — Z79.4 TYPE 2 DIABETES MELLITUS WITH STAGE 3A CHRONIC KIDNEY DISEASE, WITH LONG-TERM CURRENT USE OF INSULIN (HCC): ICD-10-CM

## 2022-11-22 DIAGNOSIS — E78.5 HYPERLIPIDEMIA, UNSPECIFIED HYPERLIPIDEMIA TYPE: ICD-10-CM

## 2022-11-22 DIAGNOSIS — E11.22 TYPE 2 DIABETES MELLITUS WITH STAGE 3A CHRONIC KIDNEY DISEASE, WITH LONG-TERM CURRENT USE OF INSULIN (HCC): ICD-10-CM

## 2022-11-22 DIAGNOSIS — N18.31 TYPE 2 DIABETES MELLITUS WITH STAGE 3A CHRONIC KIDNEY DISEASE, WITH LONG-TERM CURRENT USE OF INSULIN (HCC): ICD-10-CM

## 2022-11-22 DIAGNOSIS — I10 PRIMARY HYPERTENSION: ICD-10-CM

## 2022-11-22 LAB
ALBUMIN SERPL BCP-MCNC: 3.5 G/DL (ref 3.5–5)
ALP SERPL-CCNC: 81 U/L (ref 46–116)
ALT SERPL W P-5'-P-CCNC: 26 U/L (ref 12–78)
ANION GAP SERPL CALCULATED.3IONS-SCNC: 5 MMOL/L (ref 4–13)
AST SERPL W P-5'-P-CCNC: 27 U/L (ref 5–45)
BASOPHILS # BLD AUTO: 0.08 THOUSANDS/ÂΜL (ref 0–0.1)
BASOPHILS NFR BLD AUTO: 1 % (ref 0–1)
BILIRUB SERPL-MCNC: 0.7 MG/DL (ref 0.2–1)
BUN SERPL-MCNC: 23 MG/DL (ref 5–25)
CALCIUM SERPL-MCNC: 9.6 MG/DL (ref 8.3–10.1)
CHLORIDE SERPL-SCNC: 102 MMOL/L (ref 96–108)
CHOLEST SERPL-MCNC: 160 MG/DL
CO2 SERPL-SCNC: 28 MMOL/L (ref 21–32)
CREAT SERPL-MCNC: 1.14 MG/DL (ref 0.6–1.3)
CREAT UR-MCNC: 28.7 MG/DL
EOSINOPHIL # BLD AUTO: 0.18 THOUSAND/ÂΜL (ref 0–0.61)
EOSINOPHIL NFR BLD AUTO: 2 % (ref 0–6)
ERYTHROCYTE [DISTWIDTH] IN BLOOD BY AUTOMATED COUNT: 11.9 % (ref 11.6–15.1)
EST. AVERAGE GLUCOSE BLD GHB EST-MCNC: 137 MG/DL
GFR SERPL CREATININE-BSD FRML MDRD: 43 ML/MIN/1.73SQ M
GLUCOSE P FAST SERPL-MCNC: 159 MG/DL (ref 65–99)
HBA1C MFR BLD: 6.4 %
HCT VFR BLD AUTO: 46.5 % (ref 34.8–46.1)
HDLC SERPL-MCNC: 41 MG/DL
HGB BLD-MCNC: 14.4 G/DL (ref 11.5–15.4)
IMM GRANULOCYTES # BLD AUTO: 0.04 THOUSAND/UL (ref 0–0.2)
IMM GRANULOCYTES NFR BLD AUTO: 0 % (ref 0–2)
LDLC SERPL CALC-MCNC: 78 MG/DL (ref 0–100)
LYMPHOCYTES # BLD AUTO: 2.21 THOUSANDS/ÂΜL (ref 0.6–4.47)
LYMPHOCYTES NFR BLD AUTO: 23 % (ref 14–44)
MCH RBC QN AUTO: 29.2 PG (ref 26.8–34.3)
MCHC RBC AUTO-ENTMCNC: 31 G/DL (ref 31.4–37.4)
MCV RBC AUTO: 94 FL (ref 82–98)
MICROALBUMIN UR-MCNC: 6.3 MG/L (ref 0–20)
MICROALBUMIN/CREAT 24H UR: 22 MG/G CREATININE (ref 0–30)
MONOCYTES # BLD AUTO: 0.73 THOUSAND/ÂΜL (ref 0.17–1.22)
MONOCYTES NFR BLD AUTO: 8 % (ref 4–12)
NEUTROPHILS # BLD AUTO: 6.39 THOUSANDS/ÂΜL (ref 1.85–7.62)
NEUTS SEG NFR BLD AUTO: 66 % (ref 43–75)
NRBC BLD AUTO-RTO: 0 /100 WBCS
PLATELET # BLD AUTO: 235 THOUSANDS/UL (ref 149–390)
PMV BLD AUTO: 11.1 FL (ref 8.9–12.7)
POTASSIUM SERPL-SCNC: 4.7 MMOL/L (ref 3.5–5.3)
PROT SERPL-MCNC: 7.5 G/DL (ref 6.4–8.4)
RBC # BLD AUTO: 4.93 MILLION/UL (ref 3.81–5.12)
SODIUM SERPL-SCNC: 135 MMOL/L (ref 135–147)
TRIGL SERPL-MCNC: 203 MG/DL
WBC # BLD AUTO: 9.63 THOUSAND/UL (ref 4.31–10.16)

## 2022-12-12 ENCOUNTER — RA CDI HCC (OUTPATIENT)
Dept: OTHER | Facility: HOSPITAL | Age: 87
End: 2022-12-12

## 2022-12-12 NOTE — PROGRESS NOTES
[facility]  coding opportunities          Chart Reviewed number of suggestions sent to Provider: 2   [id number] [phone]  [id number] [phone] see 11/14/22 eye exam - updated dx    Patients Insurance     Medicare Insurance: [address] [place] [address]

## 2022-12-13 ENCOUNTER — OFFICE VISIT (OUTPATIENT)
Dept: FAMILY MEDICINE CLINIC | Facility: CLINIC | Age: 87
End: 2022-12-13

## 2022-12-13 VITALS
HEIGHT: 60 IN | DIASTOLIC BLOOD PRESSURE: 56 MMHG | WEIGHT: 153 LBS | BODY MASS INDEX: 30.04 KG/M2 | HEART RATE: 88 BPM | TEMPERATURE: 98.8 F | RESPIRATION RATE: 16 BRPM | SYSTOLIC BLOOD PRESSURE: 127 MMHG

## 2022-12-13 DIAGNOSIS — E11.22 TYPE 2 DIABETES MELLITUS WITH STAGE 3B CHRONIC KIDNEY DISEASE, WITH LONG-TERM CURRENT USE OF INSULIN (HCC): ICD-10-CM

## 2022-12-13 DIAGNOSIS — M85.80 OSTEOPENIA, UNSPECIFIED LOCATION: ICD-10-CM

## 2022-12-13 DIAGNOSIS — Z79.4 TYPE 2 DIABETES MELLITUS WITH STAGE 3B CHRONIC KIDNEY DISEASE, WITH LONG-TERM CURRENT USE OF INSULIN (HCC): ICD-10-CM

## 2022-12-13 DIAGNOSIS — N18.32 TYPE 2 DIABETES MELLITUS WITH STAGE 3B CHRONIC KIDNEY DISEASE, WITH LONG-TERM CURRENT USE OF INSULIN (HCC): ICD-10-CM

## 2022-12-13 DIAGNOSIS — Z00.00 MEDICARE ANNUAL WELLNESS VISIT, SUBSEQUENT: Primary | ICD-10-CM

## 2022-12-13 DIAGNOSIS — E78.5 HYPERLIPIDEMIA, UNSPECIFIED HYPERLIPIDEMIA TYPE: ICD-10-CM

## 2022-12-13 NOTE — PATIENT INSTRUCTIONS
Medicare Preventive Visit Patient Instructions  Thank you for completing your Welcome to Medicare Visit or Medicare Annual Wellness Visit today  Your next wellness visit will be due in one year (12/14/2023)  The screening/preventive services that you may require over the next 5-10 years are detailed below  Some tests may not apply to you based off risk factors and/or age  Screening tests ordered at today's visit but not completed yet may show as past due  Also, please note that scanned in results may not display below  Preventive Screenings:  Service Recommendations Previous Testing/Comments   Colorectal Cancer Screening  * Colonoscopy    * Fecal Occult Blood Test (FOBT)/Fecal Immunochemical Test (FIT)  * Fecal DNA/Cologuard Test  * Flexible Sigmoidoscopy Age: 39-70 years old   Colonoscopy: every 10 years (may be performed more frequently if at higher risk)  OR  FOBT/FIT: every 1 year  OR  Cologuard: every 3 years  OR  Sigmoidoscopy: every 5 years  Screening may be recommended earlier than age 39 if at higher risk for colorectal cancer  Also, an individualized decision between you and your healthcare provider will decide whether screening between the ages of 74-80 would be appropriate  Colonoscopy: Not on file  FOBT/FIT: Not on file  Cologuard: Not on file  Sigmoidoscopy: 05/17/2019          Breast Cancer Screening Age: 36 years old  Frequency: every 1-2 years  Not required if history of left and right mastectomy Mammogram: Not on file        Cervical Cancer Screening Between the ages of 21-29, pap smear recommended once every 3 years  Between the ages of 33-67, can perform pap smear with HPV co-testing every 5 years     Recommendations may differ for women with a history of total hysterectomy, cervical cancer, or abnormal pap smears in past  Pap Smear: Not on file        Hepatitis C Screening Once for adults born between [facility]  More frequently in patients at high risk for Hepatitis C Hep C Antibody: Not on file        Diabetes Screening 1-2 times per year if you're at risk for diabetes or have pre-diabetes Fasting glucose: 159 mg/dL (11/22/2022)  A1C: 6 4 % (11/22/2022)      Cholesterol Screening Once every 5 years if you don't have a lipid disorder  May order more often based on risk factors  Lipid panel: 11/22/2022          Other Preventive Screenings Covered by Medicare:  Abdominal Aortic Aneurysm (AAA) Screening: covered once if your at risk  You're considered to be at risk if you have a family history of AAA  Lung Cancer Screening: covers low dose CT scan once per year if you meet all of the following conditions: (1) Age 50-69; (2) No signs or symptoms of lung cancer; (3) Current smoker or have quit smoking within the last 15 years; (4) You have a tobacco smoking history of at least 20 pack years (packs per day multiplied by number of years you smoked); (5) You get a written order from a healthcare provider  Glaucoma Screening: covered annually if you're considered high risk: (1) You have diabetes OR (2) Family history of glaucoma OR (3)  aged 48 and older OR (3)  American aged 72 and older  Osteoporosis Screening: covered every 2 years if you meet one of the following conditions: (1) You're estrogen deficient and at risk for osteoporosis based off medical history and other findings; (2) Have a vertebral abnormality; (3) On glucocorticoid therapy for more than 3 months; (4) Have primary hyperparathyroidism; (5) On osteoporosis medications and need to assess response to drug therapy  Last bone density test (DXA Scan): 09/14/2021  HIV Screening: covered annually if you're between the age of 12-76  Also covered annually if you are younger than 13 and older than 72 with risk factors for HIV infection  For pregnant patients, it is covered up to 3 times per pregnancy      Immunizations:  Immunization Recommendations   Influenza Vaccine Annual influenza vaccination during flu season is recommended for all persons aged >= 6 months who do not have contraindications   Pneumococcal Vaccine   * Pneumococcal conjugate vaccine = PCV13 (Prevnar 13), PCV15 (Vaxneuvance), PCV20 (Prevnar 20)  * Pneumococcal polysaccharide vaccine = PPSV23 (Pneumovax) Adults 2364 years old: 1-3 doses may be recommended based on certain risk factors  Adults 72 years old: 1-2 doses may be recommended based off what pneumonia vaccine you previously received   Hepatitis B Vaccine 3 dose series if at intermediate or high risk (ex: diabetes, end stage renal disease, liver disease)   Tetanus (Td) Vaccine - COST NOT COVERED BY MEDICARE PART B Following completion of primary series, a booster dose should be given every 10 years to maintain immunity against tetanus  Td may also be given as tetanus wound prophylaxis  Tdap Vaccine - COST NOT COVERED BY MEDICARE PART B Recommended at least once for all adults  For pregnant patients, recommended with each pregnancy  Shingles Vaccine (Shingrix) - COST NOT COVERED BY MEDICARE PART B  2 shot series recommended in those aged 48 and above     Health Maintenance Due:      Topic Date Due    DXA SCAN  09/10/2024     Immunizations Due:      Topic Date Due    Hepatitis B Vaccine (1 of 3 - 3-dose series) Never done    Influenza Vaccine (1) 09/01/2022     Advance Directives   What are advance directives? Advance directives are legal documents that state your wishes and plans for medical care  These plans are made ahead of time in case you lose your ability to make decisions for yourself  Advance directives can apply to any medical decision, such as the treatments you want, and if you want to donate organs  What are the types of advance directives? There are many types of advance directives, and each state has rules about how to use them  You may choose a combination of any of the following:  Living will: This is a written record of the treatment you want   You can also choose which treatments you do not want, which to limit, and which to stop at a certain time  This includes surgery, medicine, IV fluid, and tube feedings  [facility] power of  for healthcare [facility]): This is a written record that states who you want to make healthcare choices for you when you are unable to make them for yourself  This person, called a proxy, is usually a family member or a friend  You may choose more than 1 proxy  Do not resuscitate (DNR) order:  A DNR order is used in case your heart stops beating or you stop breathing  It is a request not to have certain forms of treatment, such as CPR  A DNR order may be included in other types of advance directives  Medical directive: This covers the care that you want if you are in a coma, near death, or unable to make decisions for yourself  You can list the treatments you want for each condition  Treatment may include pain medicine, surgery, blood transfusions, dialysis, IV or tube feedings, and a ventilator (breathing machine)  Values history: This document has questions about your views, beliefs, and how you feel and think about life  This information can help others choose the care that you would choose  Why are advance directives important? An advance directive helps you control your care  Although spoken wishes may be used, it is better to have your wishes written down  Spoken wishes can be misunderstood, or not followed  Treatments may be given even if you do not want them  An advance directive may make it easier for your family to make difficult choices about your care  Weight Management   Why it is important to manage your weight:  Being overweight increases your risk of health conditions such as heart disease, high blood pressure, type 2 diabetes, and certain types of cancer  It can also increase your risk for osteoarthritis, sleep apnea, and other respiratory problems  Aim for a slow, steady weight loss   Even a small amount of weight loss can lower your risk of health problems  How to lose weight safely:  A safe and healthy way to lose weight is to eat fewer calories and get regular exercise  You can lose up about 1 pound a week by decreasing the number of calories you eat by 500 calories each day  Healthy meal plan for weight management:  A healthy meal plan includes a variety of foods, contains fewer calories, and helps you stay healthy  A healthy meal plan includes the following:  Eat whole-grain foods more often  A healthy meal plan should contain fiber  Fiber is the part of grains, fruits, and vegetables that is not broken down by your body  Whole-grain foods are healthy and provide extra fiber in your diet  Some examples of whole-grain foods are whole-wheat breads and pastas, oatmeal, brown rice, and bulgur  Eat a variety of vegetables every day  Include dark, leafy greens such as spinach, kale, suzan greens, and mustard greens  Eat yellow and orange vegetables such as carrots, sweet potatoes, and winter squash  Eat a variety of fruits every day  Choose fresh or canned fruit (canned in its own juice or light syrup) instead of juice  Fruit juice has very little or no fiber  Eat low-fat dairy foods  Drink fat-free (skim) milk or 1% milk  Eat fat-free yogurt and low-fat cottage cheese  Try low-fat cheeses such as mozzarella and other reduced-fat cheeses  Choose meat and other protein foods that are low in fat  Choose beans or other legumes such as split peas or lentils  Choose fish, skinless poultry (chicken or turkey), or lean cuts of red meat (beef or pork)  Before you cook meat or poultry, cut off any visible fat  Use less fat and oil  Try baking foods instead of frying them  Add less fat, such as margarine, sour cream, regular salad dressing and mayonnaise to foods  Eat fewer high-fat foods  Some examples of high-fat foods include french fries, doughnuts, ice cream, and cakes  Eat fewer sweets    Limit foods and drinks that are high in sugar  This includes candy, cookies, regular soda, and sweetened drinks  Exercise:  Exercise at least 30 minutes per day on most days of the week  Some examples of exercise include walking, biking, dancing, and swimming  You can also fit in more physical activity by taking the stairs instead of the elevator or parking farther away from stores  Ask your healthcare provider about the best exercise plan for you  © Copyright [employer] 2018 Information is for End User's use only and may not be sold, redistributed or otherwise used for commercial purposes  All illustrations and images included in CareNotes® are the copyrighted property of [employer]  or [address] [place] [address]  Chronic Kidney Disease   AMBULATORY CARE:   Chronic kidney disease (CKD)  is the gradual and permanent loss of kidney function  Normally, the kidneys remove fluid, chemicals, and waste from your blood  These wastes are turned into urine by your kidneys  CKD may worsen over time and lead to kidney failure  Common signs and symptoms include the following:   Changes in how often you need to urinate    Swelling in your arms, legs, or feet    Shortness of breath    Fatigue or weakness    Bad or bitter taste in your mouth    Nausea, vomiting, or loss of appetite    Call your local emergency number (911 in the [address] [facility]) if:   You have a seizure  You have shortness of breath  Seek care immediately if:   You are confused and very drowsy  Call your doctor or nephrologist if:   You suddenly gain or lose more weight than your healthcare provider has told you is okay  You have itchy skin or a rash  You urinate more or less than you normally do  You have blood in your urine  You have nausea and are vomiting  You have fatigue or muscle weakness  You have hiccups that will not stop  You have questions or concerns about your condition or care  How CKD is diagnosed:  CKD has 5 stages   Your healthcare provider will use results from the following tests to find the stage of CKD you have:  Blood and urine tests  show how well your kidneys are working  They may also show the cause of your CKD  Ultrasound, CT scan, or MRI  pictures may be used to check your kidneys  You may be given contrast liquid to help your kidneys show up better in the pictures  Tell the healthcare provider if you have ever had an allergic reaction to contrast liquid  Do not enter the MRI room with anything metal  Metal can cause serious injury  Tell the healthcare provider if you have any metal in or on your body  A biopsy  is a procedure to remove a small piece of tissue from your kidney  It is done to find the cause of your CKD  Treatment  can help control signs and symptoms, and prevent a worse stage of CKD  Your care team may include specialists, such as a dietitian or a heart specialist  This depends on the stage of your CKD and if you have other health conditions to manage  Healthcare providers will work with you to create a plan based on your decisions for treatment  Your treatment plan may include any of the following:  Medicines  may be given to decrease your blood pressure and get rid of extra fluid  You may also receive medicine to manage health conditions that may occur with CKD, such as anemia, diabetes, and heart disease  Dialysis  is a treatment to remove chemicals and waste from your blood when your kidneys can no longer do this  Surgery  may be needed to create an arteriovenous fistula (AVF) in your arm or insert a catheter into your abdomen  This is done so you can receive dialysis  A kidney transplant  may be done if your CKD becomes severe  What you can do to manage CKD: Management may include making some lifestyle changes  Tell your healthcare provider if you have any concerns about being able to make changes  He or she can help you find solutions, including working with specialists   Ask for help creating a plan to break large goals into smaller steps  Your plan may include any of the following:  Manage other health conditions  Your healthcare provider will work with you to make a care plan that meets your needs  You will be checked regularly for heart disease or other conditions that can make CKD worse, such as diabetes  Your blood pressure will be closely monitored  You will also get a target blood pressure and help making a plan to reach your target  This may include taking your blood pressure at home  Maintain a healthy weight  Your weight and body mass index (BMI) will be checked regularly  BMI helps find if your weight is healthy for your height  Your healthcare provider will use other tests to check your muscle and protein levels  Extra weight can strain your kidneys  A low weight or low muscle mass can make you feel more tired  You may have trouble doing your daily activities  Ask your provider what a healthy weight is for you  He or she can help you create a plan to lose or gain weight safely, if needed  The plan may include keeping a food diary  This is a list of foods and liquids you have each day  Your provider will use the diary to help you make changes, if needed  Changes are based on your health and any other conditions you have, such as diabetes  Create an exercise plan  Regular exercise can help you manage CKD, high blood pressure, and diabetes  Exercise also helps control weight  Your provider can help you create exercise goals and a plan to reach those goals  For example, your goal may be to exercise for 30 minutes in a day  Your plan can include breaking exercise into 10 minute sessions, 3 times during the day  Create a healthy eating plan  Your provider may tell you to eat food low in potassium, phosphorus, or protein  Your provider may also recommend vitamin or mineral supplements  Do not take any supplements without talking to your provider   A dietitian can help you plan meals if needed  Ask how much liquid to drink each day and which liquids are best for you  Limit sodium (salt) as directed  You may need to limit sodium to less than 2,300 milligrams (mg) each day  Ask your dietitian or healthcare provider how much sodium you can have each day  The amount depends on your stage of kidney disease  Table salt, canned foods, soups, salted snacks, and processed meats, like deli meats and sausage, are high in sodium  Your provider or a dietitian can show you how to read food labels for sodium  Limit alcohol as directed  Alcohol can cause fluid retention and can affect your kidneys  Ask how much alcohol is safe for you  A drink of alcohol is 12 ounces of beer, 5 ounces of wine, or 1½ ounces of liquor  Do not smoke  Nicotine and other chemicals in cigarettes and cigars can cause kidney damage  Ask your provider for information if you currently smoke and need help to quit  E-cigarettes or smokeless tobacco still contain nicotine  Talk to your provider before you use these products  Ask about over-the-counter medicines  Medicines such as NSAIDs and laxatives may harm your kidneys  Some cough and cold medicines can raise your blood pressure  Always ask if a medicine is safe before you take it  Ask about vaccines you may need  CKD can increase your risk for infections such as pneumonia, influenza, and hepatitis  Vaccines lower your risk for infection  Your healthcare provider will tell you which vaccines you need and when to get them  Follow up with your doctor or nephrologist as directed: You will need to return for tests to monitor your kidney and nerve function, and your parathyroid hormone level  Your medicines may be changed, based on certain test results  Write down your questions so you remember to ask them during your visits    © Copyright [employer] 2022 Information is for End User's use only and may not be sold, redistributed or otherwise used for commercial purposes  All illustrations and images included in CareNotes® are the copyrighted property of A D A M , Inc  or [facility] Izabella   The above information is an  only  It is not intended as medical advice for individual conditions or treatments  Talk to your doctor, nurse or pharmacist before following any medical regimen to see if it is safe and effective for you  DASH Eating Plan   WHAT YOU NEED TO KNOW:   The DASH (Dietary Approaches to Stop Hypertension) Eating Plan is designed to help prevent or lower high blood pressure  It can also help to lower LDL (bad) cholesterol and decrease your risk for heart disease  The plan is low in sodium, sugar, unhealthy fats, and total fat  It is high in potassium, calcium, magnesium, and fiber  These nutrients are added when you eat more fruits, vegetables, and whole grains  With the DASH eating plan, you need to eat a certain number of servings from each food group  This will help you get enough of certain nutrients and limit others  The amount of servings you should eat depends on how many calories you need  Your dietitian can help you create meal plans with the right number of servings for each food group  DISCHARGE INSTRUCTIONS:   What you need to know about sodium:  Your dietitian will tell you how much sodium is safe for you to have each day  People with high blood pressure should have no more than 1,500 to 2,300 mg of sodium in a day  A teaspoon (tsp) of salt has 2,300 mg of sodium  This may seem like a difficult goal, but small changes to the foods you eat can make a big difference  Your healthcare provider or dietitian can help you create a meal plan that follows your sodium limit  Read food labels  Food labels can help you choose foods that are low in sodium  The amount of sodium is listed in milligrams (mg)  The % Daily Value (DV) column tells you how much of your daily needs are met by 1 serving of the food for each nutrient listed   Choose foods that have less than 5% of the DV of sodium  These foods are considered low in sodium  Foods that have 20% or more of the DV of sodium are considered high in sodium  Avoid foods that have more than 300 mg of sodium in each serving  Choose foods that say low-sodium, reduced-sodium, or no salt added on the food label  Limit added salt  Do not salt food at the table if you add salt when you cook  Use herbs and spices, such as onions, garlic, and salt-free seasonings to add flavor  Try lemon or lime juice or vinegar to add a tart flavor  Use hot peppers or a small amount of hot pepper sauce to add a spicy flavor  Limit foods high in added salt, such as the following:    Seasonings made with salt, such as garlic salt, celery salt, onion salt, seasoned salt, meat tenderizers, and monosodium glutamate (MSG)    Miso soup and canned or dried soup mixes    Regular soy sauce, barbecue sauce, teriyaki sauce, steak sauce, Worcestershire sauce, and most flavored vinegars    Snack foods, such as salted chips, popcorn, pretzels, pork rinds, salted crackers, and salted nuts    Frozen foods, such as dinners, entrees, vegetables with sauces, and breaded meats    Ask about salt substitutes  Ask your healthcare provider if you may use salt substitutes  Some salt substitutes have ingredients that can be harmful if you have certain health conditions  Choose foods carefully at restaurants  Meals from restaurants, especially fast food restaurants, are often high in sodium  Some restaurants have nutrition information that tells you the amount of sodium in their foods  Ask to have your food prepared with less, or no salt  What you need to know about fats:  Healthy fats include unsaturated fats and omega-3 fatty acids  Unhealthy fats include saturated fats and trans fats    Include healthy fats, such as the following:      Cooking oils, such as soybean, canola, olive, or sunflower    Fatty fish, such as salmon, tuna, mackerel, or sardines    Flaxseed oil or ground flaxseed    ½ cup of cooked beans, such as black beans, kidney beans, or fischer beans    1½ ounces of low-sodium nuts, such as almonds or walnuts    Low-sugar, low-sodium peanut butter    Seeds such as angel seeds or sunflower seeds       Limit or do not have unhealthy fats, such as the following:      Foods that contain fat from animals, such as fatty meats, whole milk, butter, and cream    Shortening, stick margarine, palm oil, and coconut oil    Full-fat or creamy salad dressing    Creamy soup    Crackers, chips, and baked goods made with margarine or shortening    Foods that are fried in unhealthy fats    Gravy and sauces, such as Simón or cheese sauces    What you need to know about carbohydrates (carbs): All carbs break down into sugar  Complex carbs contain more fiber than simple carbs  This means complex carbs go into the bloodstream more slowly and cause less of a blood sugar spike  Try to include more complex carbs and fewer simple carbs  Include complex carbs, such as the followin slice of whole-grain bread    1 ounce of dry cereal that does not contain added sugar    ½ cup of cooked oatmeal    2 ounces of cooked whole-grain pasta    ½ cup of cooked brown rice    Limit or do not have simple carbs, such as the following:      [employer], such as doughnuts, pastries, and cookies    Mixes for cornbread and biscuits    White rice and pasta mixes, such as boxed macaroni and cheese    Instant and cold cereals that contain sugar    Jelly, jam, and ice cream that contain sugar    Condiments such as ketchup    Drinks high in sugar, such as soft drinks, lemonade, and fruit juice    What you need to know about vegetables and fruits:  Vegetables and fruits can be fresh, frozen, or canned  If possible, try to choose low-sodium canned options    Include a variety of vegetables and fruits, such as the followin medium apple, pear, or peach (about ½ cup chopped)    ½ small banana    ½ cup berries, such as blueberries, strawberries, or blackberries    1 cup of raw leafy greens, such as lettuce, spinach, kale, or suzan greens    ½ cup of frozen or canned (no added salt) vegetables, such as green beans    ½ cup of fresh, frozen, or canned fruit (canned in light syrup or fruit juice)    ½ cup of vegetable or fruit juice    Limit or do not have vegetables and fruits made in the following ways:      Frozen fruit such as cherries that have added sugar    Fruit in cream or butter sauce    Canned vegetables that are high in sodium    Sauerkraut, pickled vegetables, and other foods prepared in brine    Fried vegetables or vegetables in butter or high-fat sauces    What you need to know about protein foods: Include lean or low-fat protein foods, such as the following:      Poultry (chicken, turkey) with no skin    Fish (especially fatty fish, such as salmon, fresh tuna, or mackerel)    Lean beef and pork (loin, round, extra lean hamburger)    Egg whites and egg substitutes    1 cup of nonfat (skim) or 1% milk    1½ ounces of fat-free or low-fat cheese    6 ounces of nonfat or low-fat yogurt    Limit or do not have high-fat protein foods, such as the following:      Smoked or cured meat, such as corned beef, bermudez, ham, hot dogs, and sausage    Canned beans and canned meats or spreads, such as potted meats, sardines, anchovies, and imitation seafood    Deli or lunch meats, such as bologna, ham, turkey, and roast beef    High-fat meat (T-bone steak, regular hamburger, and ribs)    Whole eggs and egg yolks    Whole milk, 2% milk, and cream    Regular cheese and processed cheese    Other guidelines to follow:   Maintain a healthy weight  Your risk for heart disease is higher if you are overweight  Your healthcare provider may suggest that you lose weight if you are overweight  You can lose weight by eating fewer calories and foods that have added sugars and fat   The DASH meal plan can help you do this  Decrease calories by eating smaller portions at each meal and fewer snacks  Ask your healthcare provider for more information about how to lose weight  Exercise regularly  Regular exercise can help you reach or maintain a healthy weight  Regular exercise can also help decrease your blood pressure and improve your cholesterol levels  Get 30 minutes or more of moderate exercise each day of the week  To lose weight, get at least 60 minutes of exercise  Talk to your healthcare provider about the best exercise program for you  Limit alcohol  Women should limit alcohol to 1 drink a day  Men should limit alcohol to 2 drinks a day  A drink of alcohol is 12 ounces of beer, 5 ounces of wine, or 1½ ounces of liquor  For more information:   [facility] [address]  [address] [address]  [place] [address]  Phone: [phone]  Web Address: [facility] no    © 8450 [facility] 2022 Information is for End User's use only and may not be sold, redistributed or otherwise used for commercial purposes  All illustrations and images included in CareNotes® are the copyrighted property of A D A M , Inc  or [address] [place], [place] [address]higinio Parekh   The above information is an  only  It is not intended as medical advice for individual conditions or treatments  Talk to your doctor, nurse or pharmacist before following any medical regimen to see if it is safe and effective for you

## 2022-12-13 NOTE — PROGRESS NOTES
Assessment and Plan:     Problem List Items Addressed This Visit        Endocrine    Type 2 diabetes mellitus with stage 3b chronic kidney disease, with long-term current use of insulin ([address] )       Lab Results   Component Value Date    HGBA1C 6 4 (H) 11/22/2022     Patient's most recent hemoglobin A1c is 6 4  Her GFR is 43  This is stable  The patient does not follow with nephrology  She is not interested in a nephrology follow-up at this point in time  Nephrotoxins discussed with the patient  Relevant Orders    Hemoglobin A1C       Musculoskeletal and Integument    Osteopenia     Patient continues on vitamin D and calcium            Other    Hyperlipidemia     Patient continues on simvastatin  Lipid panel stable  Relevant Orders    Lipid Panel with Direct LDL reflex   Other Visit Diagnoses     Medicare annual wellness visit, subsequent    -  Primary          Depression Screening and Follow-up Plan: Patient was screened for depression during today's encounter  They screened negative with a PHQ-2 score of 0  Preventive health issues were discussed with patient, and age appropriate screening tests were ordered as noted in patient's After Visit Summary  Personalized health advice and appropriate referrals for health education or preventive services given if needed, as noted in patient's After Visit Summary  History of Present Illness:     Patient presents for a Medicare Wellness Visit    The patient presents to the office today for a TCM visit and follow-up  Overall the patient is stable  Recent blood work was reviewed with the patient  She is scheduled for diabetic foot exam in February  She had her last diabetic eye exam in November  She has no new medical complaints  Her diabetes is stable  She will be seen back in the office in 6 months       Patient Care Team:  Lyric Harvey MD as PCP - [address] DPM (Podiatry)  Gayle Florence MD (Ophthalmology)  Carlitos Calix MD (Ophthalmology)     Review of Systems:     Review of Systems   Constitutional: Negative for activity change, fatigue and fever  HENT: Negative for congestion, hearing loss, rhinorrhea, trouble swallowing and voice change  Eyes: Negative for photophobia, pain, discharge and visual disturbance  Respiratory: Negative for cough, chest tightness and shortness of breath  Cardiovascular: Negative for chest pain, palpitations and leg swelling  Gastrointestinal: Negative for abdominal pain, blood in stool, constipation, nausea and vomiting  Endocrine: Negative for cold intolerance and heat intolerance  Genitourinary: Negative for difficulty urinating, frequency, hematuria, urgency, vaginal bleeding and vaginal discharge  Musculoskeletal: Negative for arthralgias and myalgias  Skin: Negative  Neurological: Negative for dizziness, weakness, numbness and headaches  Psychiatric/Behavioral: Negative for decreased concentration  The patient is not nervous/anxious           Problem List:     Patient Active Problem List   Diagnosis   • Type 2 diabetes mellitus with stage 3b chronic kidney disease, with long-term current use of insulin ([facility] [address] )   • Glaucoma   • Hyperlipidemia   • Hypertension   • Osteoarthritis   • Osteopenia   • Spinal stenosis   • Vitamin D deficiency   • Pain in both lower extremities   • Lumbar foraminal stenosis   • Onychomycosis   • Diabetes mellitus with neuropathy (HCC)   • Nonproliferative diabetic retinopathy of left eye [facility])      Past Medical and Surgical History:     Past Medical History:   Diagnosis Date   • Carpal tunnel syndrome    • Choledocholithiasis     resolved 3/6/2015   • Diverticulosis    • Inguinal hernia    • Sciatica      Past Surgical History:   Procedure Laterality Date   • ESOPHAGOGASTRODUODENOSCOPY      resolved 1/2002   • HEMORRHOID SURGERY      resolved 2/1999   • NEUROPLASTY / TRANSPOSITION MEDIAN NERVE AT CARPAL TUNNEL Right     last assessed 8/10/2012   • RECTAL SURGERY        Family History:     Family History   Problem Relation Age of Onset   • Heart attack Mother         MI   • Coronary artery disease Mother    • Diabetes Mother         DM   • Hypertension Mother    • Stroke Mother         syndrome   • Stroke Father         syndrome      Social History:     Social History     Socioeconomic History   • Marital status:      Spouse name: None   • Number of children: None   • Years of education: None   • Highest education level: None   Occupational History   • Occupation: retired   Tobacco Use   • Smoking status: Never   • Smokeless tobacco: Never   Vaping Use   • Vaping Use: Never used   Substance and Sexual Activity   • Alcohol use: No   • Drug use: No   • Sexual activity: None   Other Topics Concern   • None   Social History Narrative    Always uses seat belt          Social Determinants of Health     Financial Resource Strain: Low Risk    • Difficulty of Paying Living Expenses: Not hard at all   Food Insecurity: Not on file   Transportation Needs: No Transportation Needs   • Lack of Transportation (Medical): No   • Lack of Transportation (Non-Medical):  No   Physical Activity: Not on file   Stress: Not on file   Social Connections: Not on file   Intimate Partner Violence: Not on file   Housing Stability: Not on file      Medications and Allergies:     Current Outpatient Medications   Medication Sig Dispense Refill   • aspirin 81 mg chewable tablet Chew 81 mg daily     • brimonidine (ALPHAGAN P) 0 1 % Apply to eye     • Calcium Carbonate-Vit D-Min (CALCIUM 1200) 0181-4359 MG-UNIT CHEW Chew     • Cholecalciferol (VITAMIN D3) 5000 UNIT/ML LIQD Take by mouth     • insulin aspart (NovoLOG FlexPen) 100 UNIT/ML injection pen INJECT 10 UNITS WITH MEALS AND INSULIN SLIDING SCALE AS NEEDED 90 mL 1   • insulin detemir (Levemir FlexTouch) 100 Units/mL injection pen Use 37u am and 34u pm  75 mL 1   • Insulin Pen Needle 32G X 4 MM MISC Use 4 (four) times a day 400 each 3   • latanoprost (XALATAN) 0 005 % ophthalmic solution Apply to eye     • losartan (COZAAR) 100 MG tablet TAKE 1 TABLET BY MOUTH EVERY DAY 90 tablet 2   • OneTouch Delica Lancets 88Q MISC by Other route 3 (three) times a day Check blood sugar 4 times per day 300 each 3   • OneTouch Verio test strip USE 1 EACH 3 (THREE) TIMES A DAY USE AS INSTRUCTED 300 strip 1   • simvastatin (ZOCOR) 80 mg tablet TAKE 1 TABLET BY MOUTH EVERY DAY 90 tablet 3     No current facility-administered medications for this visit  Allergies   Allergen Reactions   • Ace Inhibitors Cough      Immunizations:     Immunization History   Administered Date(s) Administered   • COVID-19 PFIZER VACCINE 0 3 ML IM 01/28/2021, 02/17/2021, 10/09/2021   • COVID-19 Pfizer Vac BIVALENT Carlitos-sucrose 12 Yr+ IM (BOOSTER ONLY) 10/05/2022   • COVID-19 Pfizer vac (Carlitos-sucrose, gray cap) 12 yr+ IM 04/20/2022   • INFLUENZA 12/03/2004, 11/12/2018, 11/08/2019, 10/21/2021, 10/21/2022   • Influenza Split High Dose Preservative Free IM 12/29/2014, 11/08/2019   • Influenza, high dose seasonal 0 7 mL 10/09/2018   • Influenza, seasonal, injectable 10/06/2010, 11/08/2012   • Pneumococcal Conjugate 13-Valent 04/29/2015   • Pneumococcal Polysaccharide PPV23 11/01/2002, 11/10/2016   • Tdap 01/01/2000   • Zoster Vaccine Recombinant 07/05/2019, 10/08/2019      Health Maintenance:         Topic Date Due   • DXA SCAN  09/10/2024     There are no preventive care reminders to display for this patient  Medicare Screening Tests and Risk Assessments:     Estefany Trotter is here for her Subsequent Wellness visit  Last Medicare Wellness visit information reviewed, patient interviewed, no change since last AWV  Health Risk Assessment:   Patient rates overall health as good  Patient feels that their physical health rating is same  Patient is satisfied with their life  Eyesight was rated as same  Hearing was rated as same   Patient feels that their emotional and mental health rating is same  Patients states they are never, rarely angry  Patient states they are never, rarely unusually tired/fatigued  Pain experienced in the last 7 days has been some  Patient's pain rating has been 5/10  Patient states that she has experienced no weight loss or gain in last 6 months  Depression Screening:   PHQ-2 Score: 0      Fall Risk Screening: In the past year, patient has experienced: no history of falling in past year      Urinary Incontinence Screening:   Patient has not leaked urine accidently in the last six months  Home Safety:  Patient does not have trouble with stairs inside or outside of their home  Patient has working smoke alarms and has no working carbon monoxide detector  Home safety hazards include: none  Nutrition:   Current diet is Diabetic and No Added Salt  Eats lunch at [employer], can't control salt and fat in diet with those meals    Medications:   Patient is currently taking over-the-counter supplements  OTC medications include: see medication list  Patient is able to manage medications  Activities of Daily Living (ADLs)/Instrumental Activities of Daily Living (IADLs):   Walk and transfer into and out of bed and chair?: Yes  Dress and groom yourself?: Yes    Bathe or shower yourself?: Yes    Feed yourself?  Yes  Do your laundry/housekeeping?: Yes  Manage your money, pay your bills and track your expenses?: Yes  Make your own meals?: Yes    Do your own shopping?: Yes    Previous Hospitalizations:   Any hospitalizations or ED visits within the last 12 months?: No      Advance Care Planning:     Durable POA for healthcare: Yes      Comments: Daughter Wen Fontanez is durable POA     Cognitive Screening:   Provider or family/friend/caregiver concerned regarding cognition?: No    PREVENTIVE SCREENINGS      Cardiovascular Screening:    General: History Lipid Disorder and Screening Current    Due for: Lipid Panel      Diabetes Screening:     General: History Diabetes and Screening Current    Due for: Blood Glucose      Colorectal Cancer Screening:     General: Screening Not Indicated      Breast Cancer Screening:     General: Screening Not Indicated      Cervical Cancer Screening:    General: Screening Not Indicated      Osteoporosis Screening:    General: Screening Current      Abdominal Aortic Aneurysm (AAA) Screening:        General: Screening Not Indicated      Lung Cancer Screening:     General: Screening Not Indicated      Hepatitis C Screening:    General: Screening Not Indicated    Screening, Brief Intervention, and Referral to Treatment (SBIRT)    Screening      AUDIT-C Screenin) How often did you have a drink containing alcohol in the past year? never  2) How many drinks did you have on a typical day when you were drinking in the past year? 0  3) How often did you have 6 or more drinks on one occasion in the past year? never    AUDIT-C Score: 0  Interpretation: Score 0-2 (female): Negative screen for alcohol misuse    No results found  Physical Exam:     /56   Pulse 88   Temp 98 8 °F (37 1 °C) (Tympanic)   Resp 16   Ht 5' (1 524 m)   Wt 69 4 kg (153 lb)   BMI 29 88 kg/m²     Physical Exam  Vitals reviewed  Constitutional:       Appearance: Normal appearance  She is obese  HENT:      Head: Normocephalic  Nose: Nose normal       Mouth/Throat:      Mouth: Mucous membranes are moist       Pharynx: Oropharynx is clear  Eyes:      Extraocular Movements: Extraocular movements intact  Pupils: Pupils are equal, round, and reactive to light  Cardiovascular:      Rate and Rhythm: Normal rate and regular rhythm  Pulses: Normal pulses  Heart sounds: Normal heart sounds  Pulmonary:      Effort: Pulmonary effort is normal       Breath sounds: Normal breath sounds  Musculoskeletal:         General: Normal range of motion  Skin:     General: Skin is warm and dry  Neurological:      General: No focal deficit present        Mental Status: She is alert and oriented to person, place, and time  Psychiatric:         Mood and Affect: Mood normal          Behavior: Behavior normal          Thought Content:  Thought content normal          Judgment: Judgment normal           SHAI Shelton

## 2022-12-13 NOTE — ASSESSMENT & PLAN NOTE
Lab Results   Component Value Date    HGBA1C 6 4 (H) 11/22/2022     Patient's most recent hemoglobin A1c is 6 4  Her GFR is 43  This is stable  The patient does not follow with nephrology  She is not interested in a nephrology follow-up at this point in time  Nephrotoxins discussed with the patient

## 2022-12-14 ENCOUNTER — TELEPHONE (OUTPATIENT)
Dept: ADMINISTRATIVE | Facility: OTHER | Age: 87
End: 2022-12-14

## 2022-12-14 NOTE — TELEPHONE ENCOUNTER
----- Message from Sheridan Grajeda, [address] sent at 12/13/2022  4:06 PM EST -----  11/16/2022  Diabetic eye exam scanned under media

## 2022-12-14 NOTE — TELEPHONE ENCOUNTER
Upon review of the In Basket request we were able to locate, review, and update the patient chart as requested for Diabetic Eye Exam     Any additional questions or concerns should be emailed to the Practice Liaisons via the appropriate education email address, please do not reply via In Basket      Thank you  Neymar Sykes MA

## 2023-02-11 DIAGNOSIS — E11.65 TYPE 2 DIABETES MELLITUS WITH HYPERGLYCEMIA, WITH LONG-TERM CURRENT USE OF INSULIN (HCC): ICD-10-CM

## 2023-02-11 DIAGNOSIS — Z79.4 TYPE 2 DIABETES MELLITUS WITH HYPERGLYCEMIA, WITH LONG-TERM CURRENT USE OF INSULIN (HCC): ICD-10-CM

## 2023-02-11 RX ORDER — BLOOD SUGAR DIAGNOSTIC
1 STRIP MISCELLANEOUS 3 TIMES DAILY
Qty: 300 STRIP | Refills: 1 | Status: SHIPPED | OUTPATIENT
Start: 2023-02-11

## 2023-04-11 DIAGNOSIS — N18.32 TYPE 2 DIABETES MELLITUS WITH STAGE 3B CHRONIC KIDNEY DISEASE, WITH LONG-TERM CURRENT USE OF INSULIN (HCC): Primary | ICD-10-CM

## 2023-04-11 DIAGNOSIS — Z79.4 TYPE 2 DIABETES MELLITUS WITH STAGE 3B CHRONIC KIDNEY DISEASE, WITH LONG-TERM CURRENT USE OF INSULIN (HCC): Primary | ICD-10-CM

## 2023-04-11 DIAGNOSIS — E11.22 TYPE 2 DIABETES MELLITUS WITH STAGE 3B CHRONIC KIDNEY DISEASE, WITH LONG-TERM CURRENT USE OF INSULIN (HCC): Primary | ICD-10-CM

## 2023-04-11 RX ORDER — INSULIN LISPRO 100 [IU]/ML
10 INJECTION, SOLUTION INTRAVENOUS; SUBCUTANEOUS
Qty: 90 ML | Refills: 1 | Status: SHIPPED | OUTPATIENT
Start: 2023-04-11

## 2023-04-12 DIAGNOSIS — N18.32 TYPE 2 DIABETES MELLITUS WITH STAGE 3B CHRONIC KIDNEY DISEASE, WITH LONG-TERM CURRENT USE OF INSULIN (HCC): Primary | ICD-10-CM

## 2023-04-12 DIAGNOSIS — E78.5 HYPERLIPIDEMIA, UNSPECIFIED HYPERLIPIDEMIA TYPE: ICD-10-CM

## 2023-04-12 DIAGNOSIS — E11.22 TYPE 2 DIABETES MELLITUS WITH STAGE 3B CHRONIC KIDNEY DISEASE, WITH LONG-TERM CURRENT USE OF INSULIN (HCC): Primary | ICD-10-CM

## 2023-04-12 DIAGNOSIS — Z79.4 TYPE 2 DIABETES MELLITUS WITH STAGE 3B CHRONIC KIDNEY DISEASE, WITH LONG-TERM CURRENT USE OF INSULIN (HCC): Primary | ICD-10-CM

## 2023-04-17 DIAGNOSIS — E78.5 HYPERLIPIDEMIA, UNSPECIFIED HYPERLIPIDEMIA TYPE: ICD-10-CM

## 2023-04-17 DIAGNOSIS — I10 PRIMARY HYPERTENSION: ICD-10-CM

## 2023-04-17 DIAGNOSIS — Z79.4 TYPE 2 DIABETES MELLITUS WITH STAGE 3B CHRONIC KIDNEY DISEASE, WITH LONG-TERM CURRENT USE OF INSULIN (HCC): Primary | ICD-10-CM

## 2023-04-17 DIAGNOSIS — N18.32 TYPE 2 DIABETES MELLITUS WITH STAGE 3B CHRONIC KIDNEY DISEASE, WITH LONG-TERM CURRENT USE OF INSULIN (HCC): Primary | ICD-10-CM

## 2023-04-17 DIAGNOSIS — E11.22 TYPE 2 DIABETES MELLITUS WITH STAGE 3B CHRONIC KIDNEY DISEASE, WITH LONG-TERM CURRENT USE OF INSULIN (HCC): Primary | ICD-10-CM

## 2023-05-15 DIAGNOSIS — E11.22 TYPE 2 DIABETES MELLITUS WITH STAGE 3 CHRONIC KIDNEY DISEASE, WITH LONG-TERM CURRENT USE OF INSULIN (HCC): ICD-10-CM

## 2023-05-15 DIAGNOSIS — N18.30 TYPE 2 DIABETES MELLITUS WITH STAGE 3 CHRONIC KIDNEY DISEASE, WITH LONG-TERM CURRENT USE OF INSULIN (HCC): ICD-10-CM

## 2023-05-15 DIAGNOSIS — Z79.4 TYPE 2 DIABETES MELLITUS WITH STAGE 3 CHRONIC KIDNEY DISEASE, WITH LONG-TERM CURRENT USE OF INSULIN (HCC): ICD-10-CM

## 2023-05-15 RX ORDER — INSULIN DETEMIR 100 [IU]/ML
INJECTION, SOLUTION SUBCUTANEOUS
Qty: 75 ML | Refills: 1 | Status: SHIPPED | OUTPATIENT
Start: 2023-05-15 | End: 2023-05-16 | Stop reason: SDUPTHER

## 2023-05-16 ENCOUNTER — TELEPHONE (OUTPATIENT)
Dept: FAMILY MEDICINE CLINIC | Facility: CLINIC | Age: 88
End: 2023-05-16

## 2023-05-16 DIAGNOSIS — Z79.4 TYPE 2 DIABETES MELLITUS WITH STAGE 3B CHRONIC KIDNEY DISEASE, WITH LONG-TERM CURRENT USE OF INSULIN (HCC): Primary | ICD-10-CM

## 2023-05-16 DIAGNOSIS — E11.22 TYPE 2 DIABETES MELLITUS WITH STAGE 3B CHRONIC KIDNEY DISEASE, WITH LONG-TERM CURRENT USE OF INSULIN (HCC): Primary | ICD-10-CM

## 2023-05-16 DIAGNOSIS — N18.32 TYPE 2 DIABETES MELLITUS WITH STAGE 3B CHRONIC KIDNEY DISEASE, WITH LONG-TERM CURRENT USE OF INSULIN (HCC): Primary | ICD-10-CM

## 2023-05-16 RX ORDER — INSULIN DETEMIR 100 [IU]/ML
INJECTION, SOLUTION SUBCUTANEOUS
Qty: 75 ML | Refills: 1 | Status: SHIPPED | OUTPATIENT
Start: 2023-05-16

## 2023-05-16 NOTE — TELEPHONE ENCOUNTER
Patient ([phone]) called to report adjustment needs to be made to insulin prescription  Reports insulin levemir flex touch injection pen is no longer covered by insurance  Asked to send script for insulin flex pen be sent to pharmacy for a 90 day supply to [address] [place], [place] [address]Th [facility]

## 2023-06-01 ENCOUNTER — LAB (OUTPATIENT)
Dept: LAB | Facility: CLINIC | Age: 88
End: 2023-06-01
Payer: MEDICARE

## 2023-06-01 DIAGNOSIS — Z79.4 TYPE 2 DIABETES MELLITUS WITH STAGE 3B CHRONIC KIDNEY DISEASE, WITH LONG-TERM CURRENT USE OF INSULIN (HCC): ICD-10-CM

## 2023-06-01 DIAGNOSIS — E78.5 HYPERLIPIDEMIA, UNSPECIFIED HYPERLIPIDEMIA TYPE: ICD-10-CM

## 2023-06-01 DIAGNOSIS — E11.22 TYPE 2 DIABETES MELLITUS WITH STAGE 3B CHRONIC KIDNEY DISEASE, WITH LONG-TERM CURRENT USE OF INSULIN (HCC): ICD-10-CM

## 2023-06-01 DIAGNOSIS — N18.32 TYPE 2 DIABETES MELLITUS WITH STAGE 3B CHRONIC KIDNEY DISEASE, WITH LONG-TERM CURRENT USE OF INSULIN (HCC): ICD-10-CM

## 2023-06-01 DIAGNOSIS — I10 PRIMARY HYPERTENSION: ICD-10-CM

## 2023-06-01 LAB
ALBUMIN SERPL BCP-MCNC: 3.7 G/DL (ref 3.5–5)
ALP SERPL-CCNC: 81 U/L (ref 46–116)
ALT SERPL W P-5'-P-CCNC: 20 U/L (ref 12–78)
ANION GAP SERPL CALCULATED.3IONS-SCNC: 0 MMOL/L (ref 4–13)
AST SERPL W P-5'-P-CCNC: 21 U/L (ref 5–45)
BILIRUB SERPL-MCNC: 0.78 MG/DL (ref 0.2–1)
BUN SERPL-MCNC: 28 MG/DL (ref 5–25)
CALCIUM SERPL-MCNC: 9.8 MG/DL (ref 8.3–10.1)
CHLORIDE SERPL-SCNC: 108 MMOL/L (ref 96–108)
CHOLEST SERPL-MCNC: 166 MG/DL
CO2 SERPL-SCNC: 28 MMOL/L (ref 21–32)
CREAT SERPL-MCNC: 1.22 MG/DL (ref 0.6–1.3)
EST. AVERAGE GLUCOSE BLD GHB EST-MCNC: 137 MG/DL
GFR SERPL CREATININE-BSD FRML MDRD: 39 ML/MIN/1.73SQ M
GLUCOSE P FAST SERPL-MCNC: 153 MG/DL (ref 65–99)
HBA1C MFR BLD: 6.4 %
HDLC SERPL-MCNC: 42 MG/DL
LDLC SERPL CALC-MCNC: 95 MG/DL (ref 0–100)
POTASSIUM SERPL-SCNC: 4.9 MMOL/L (ref 3.5–5.3)
PROT SERPL-MCNC: 7.7 G/DL (ref 6.4–8.4)
SODIUM SERPL-SCNC: 136 MMOL/L (ref 135–147)
TRIGL SERPL-MCNC: 146 MG/DL

## 2023-06-01 PROCEDURE — 83036 HEMOGLOBIN GLYCOSYLATED A1C: CPT

## 2023-06-01 PROCEDURE — 80053 COMPREHEN METABOLIC PANEL: CPT

## 2023-06-01 PROCEDURE — 80061 LIPID PANEL: CPT

## 2023-06-01 PROCEDURE — 36415 COLL VENOUS BLD VENIPUNCTURE: CPT

## 2023-06-14 ENCOUNTER — OFFICE VISIT (OUTPATIENT)
Dept: FAMILY MEDICINE CLINIC | Facility: CLINIC | Age: 88
End: 2023-06-14
Payer: MEDICARE

## 2023-06-14 VITALS
HEIGHT: 60 IN | OXYGEN SATURATION: 96 % | DIASTOLIC BLOOD PRESSURE: 70 MMHG | RESPIRATION RATE: 16 BRPM | TEMPERATURE: 99.2 F | BODY MASS INDEX: 30.08 KG/M2 | SYSTOLIC BLOOD PRESSURE: 142 MMHG | HEART RATE: 83 BPM | WEIGHT: 153.2 LBS

## 2023-06-14 DIAGNOSIS — Z79.4 TYPE 2 DIABETES MELLITUS WITH STAGE 3B CHRONIC KIDNEY DISEASE, WITH LONG-TERM CURRENT USE OF INSULIN (HCC): Primary | ICD-10-CM

## 2023-06-14 DIAGNOSIS — E11.22 TYPE 2 DIABETES MELLITUS WITH STAGE 3B CHRONIC KIDNEY DISEASE, WITH LONG-TERM CURRENT USE OF INSULIN (HCC): Primary | ICD-10-CM

## 2023-06-14 DIAGNOSIS — I10 PRIMARY HYPERTENSION: ICD-10-CM

## 2023-06-14 DIAGNOSIS — E78.2 MIXED HYPERLIPIDEMIA: ICD-10-CM

## 2023-06-14 DIAGNOSIS — N18.32 TYPE 2 DIABETES MELLITUS WITH STAGE 3B CHRONIC KIDNEY DISEASE, WITH LONG-TERM CURRENT USE OF INSULIN (HCC): Primary | ICD-10-CM

## 2023-06-14 PROBLEM — B35.1 ONYCHOMYCOSIS DUE TO DERMATOPHYTE: Status: ACTIVE | Noted: 2023-04-06

## 2023-06-14 PROCEDURE — 99214 OFFICE O/P EST MOD 30 MIN: CPT | Performed by: FAMILY MEDICINE

## 2023-06-14 RX ORDER — INSULIN DETEMIR 100 [IU]/ML
INJECTION, SOLUTION SUBCUTANEOUS
Qty: 75 ML | Refills: 1
Start: 2023-06-14

## 2023-06-14 NOTE — PROGRESS NOTES
Name: Lex Serrano      : 1935      MRN: [id number]  Encounter Provider: Apurva Guerrero MD  Encounter Date: 2023   Encounter department: [address] [place] [address]  Type 2 diabetes mellitus with stage 3b chronic kidney disease, with long-term current use of insulin ([facility])  Assessment & Plan:    Lab Results   Component Value Date    HGBA1C 6 4 (H) 2023     Decrease levemir to 34u bid  Continue humalog 9/10/10u with meals  Call office if hypoglycemia episode  Orders:  -     insulin detemir (Levemir FlexPen) 100 Units/mL injection pen; 34u am and 34u pm  -     CBC; Future; Expected date: 2023  -     Comprehensive metabolic panel; Future; Expected date: 2023  -     Hemoglobin A1C; Future; Expected date: 2023  -     Lipid panel; Future; Expected date: 2023  -     Albumin / creatinine urine ratio; Future; Expected date: 2023    2  Primary hypertension  Assessment & Plan:  Controlled  DASH diet  Continue losartan 100mg QD  Orders:  -     CBC; Future; Expected date: 2023  -     Comprehensive metabolic panel; Future; Expected date: 2023  -     Hemoglobin A1C; Future; Expected date: 2023  -     Lipid panel; Future; Expected date: 2023  -     Albumin / creatinine urine ratio; Future; Expected date: 2023    3  Mixed hyperlipidemia  Assessment & Plan:  Low fat diet  Continue simvastatin 80mg qhs  Orders:  -     CBC; Future; Expected date: 2023  -     Comprehensive metabolic panel; Future; Expected date: 2023  -     Hemoglobin A1C; Future; Expected date: 2023  -     Lipid panel; Future; Expected date: 2023  -     Albumin / creatinine urine ratio; Future; Expected date: 2023    BMI Counseling: Body mass index is 29 92 kg/m²   The BMI is above normal  Nutrition recommendations include decreasing portion sizes, encouraging healthy choices of fruits and vegetables, decreasing fast food intake, consuming healthier snacks and limiting drinks that contain sugar  Exercise recommendations include moderate physical activity 150 minutes/week  No pharmacotherapy was ordered  Rationale for BMI follow-up plan is due to patient being overweight or obese  Depression Screening and Follow-up Plan: Patient was screened for depression during today's encounter  They screened negative with a PHQ-2 score of 0  Reviewed lab in 6/2023  Lipid 166/146/42/95 good  HgA1C 6 4 good  CMP ok GFR 39    Got flu shot yearly  Got covid19 vaccines and boosters  Got prevnar 4/2015  Got pneumovax 2016  Got shingrix 2019  Fall precautions  RTO in 6 months with labs        Subjective      HPI     Pt is here by herself       DM---6/2023 hgA1C 6 4 controlled  She is on levemir 37u am and 34u pm    She is on humalog 9u/10u/10u with meals     Had hypoglycemia once per month after exercise  Her glucose was 57 and she felt it  Denies neuropathy    FU opthalmology Dr Andra Sutton  Right eye cannot see  FU retinal specialist also per pt  FU podiatry Dr Neyda Triana Q 3-4 months      HTN----She is on losartan 100mg QD  Denies SOB, CP, headache etc  BP at home 140/80 per pt       Hyperlipidemia----she is on simvastatin 80mg qhs  Denies side effects  [facility]     Pt lives by herself  Does all ADL's  Still driving  Had children and great-grandson living close to her  Denies recent falls  Denies depression        Review of Systems   Constitutional: Negative for appetite change, chills and fever  HENT: Negative for congestion, ear pain, sinus pain and sore throat  Eyes: Negative for discharge and itching  Respiratory: Negative for apnea, cough, chest tightness, shortness of breath and wheezing  Cardiovascular: Negative for chest pain, palpitations and leg swelling     Gastrointestinal: Negative for abdominal pain, anal bleeding, constipation, diarrhea, nausea and vomiting  Endocrine: Negative for cold intolerance, heat intolerance and polyuria  Genitourinary: Negative for difficulty urinating and dysuria  Musculoskeletal: Negative for arthralgias, back pain and myalgias  Skin: Negative for rash  Neurological: Negative for dizziness and headaches  Psychiatric/Behavioral: Negative for agitation  Current Outpatient Medications on File Prior to Visit   Medication Sig   • aspirin 81 mg chewable tablet Chew 81 mg daily   • brimonidine (ALPHAGAN P) 0 1 % Apply to eye   • Calcium Carbonate-Vit D-Min (CALCIUM 1200) 2470-6954 MG-UNIT CHEW Chew   • Cholecalciferol (VITAMIN D3) 5000 UNIT/ML LIQD Take by mouth   • insulin aspart (NovoLOG FlexPen) 100 UNIT/ML injection pen INJECT 10 UNITS WITH MEALS AND INSULIN SLIDING SCALE AS NEEDED   • insulin lispro (HumaLOG KwikPen) 100 units/mL injection pen Inject 10 Units under the skin 3 (three) times a day with meals And ISS   • Insulin Pen Needle 32G X 4 MM MISC Use 4 (four) times a day   • latanoprost (XALATAN) 0 005 % ophthalmic solution Apply to eye   • [DISCONTINUED] insulin detemir (Levemir FlexPen) 100 Units/mL injection pen 37u am and 34u pm   • losartan (COZAAR) 100 MG tablet TAKE 1 TABLET BY MOUTH EVERY DAY   • OneTouch Delica Lancets 28L MISC by Other route 3 (three) times a day Check blood sugar 4 times per day   • OneTouch Verio test strip USE 1 EACH 3 (THREE) TIMES A DAY USE AS INSTRUCTED   • simvastatin (ZOCOR) 80 mg tablet TAKE 1 TABLET BY MOUTH EVERY DAY       Objective     /70 (BP Location: Left arm, Patient Position: Sitting, Cuff Size: Standard)   Pulse 83   Temp 99 2 °F (37 3 °C) (Temporal)   Resp 16   Ht 5' (1 524 m)   Wt 69 5 kg (153 lb 3 2 oz)   SpO2 96%   BMI 29 92 kg/m²     Physical Exam  Constitutional:       General: She is not in acute distress  Appearance: She is well-developed  HENT:      Head: Normocephalic  Eyes:      General:         Right eye: No discharge  Left eye: No discharge  Conjunctiva/sclera: Conjunctivae normal    Neck:      Thyroid: No thyromegaly  Cardiovascular:      Rate and Rhythm: Normal rate and regular rhythm  Heart sounds: Normal heart sounds  No murmur heard  No friction rub  No gallop  Pulmonary:      Effort: Pulmonary effort is normal  No respiratory distress  Breath sounds: Normal breath sounds  No wheezing or rales  Chest:      Chest wall: No tenderness  Abdominal:      General: Bowel sounds are normal  There is no distension  Palpations: Abdomen is soft  There is no mass  Tenderness: There is no abdominal tenderness  There is no guarding or rebound  Musculoskeletal:         General: No tenderness or deformity  Normal range of motion  Cervical back: Normal range of motion and neck supple  No tenderness  Lymphadenopathy:      Cervical: No cervical adenopathy  Neurological:      Mental Status: She is alert         Murali Lainez MD

## 2023-06-14 NOTE — ASSESSMENT & PLAN NOTE
Lab Results   Component Value Date    HGBA1C 6 4 (H) 06/01/2023     Decrease levemir to 34u bid  Continue humalog 9/10/10u with meals  Call office if hypoglycemia episode

## 2023-08-11 DIAGNOSIS — E11.65 TYPE 2 DIABETES MELLITUS WITH HYPERGLYCEMIA, WITH LONG-TERM CURRENT USE OF INSULIN (HCC): ICD-10-CM

## 2023-08-11 DIAGNOSIS — Z79.4 TYPE 2 DIABETES MELLITUS WITH HYPERGLYCEMIA, WITH LONG-TERM CURRENT USE OF INSULIN (HCC): ICD-10-CM

## 2023-08-11 RX ORDER — BLOOD SUGAR DIAGNOSTIC
STRIP MISCELLANEOUS
Qty: 300 STRIP | Refills: 1 | Status: SHIPPED | OUTPATIENT
Start: 2023-08-11

## 2023-08-23 LAB
LEFT EYE DIABETIC RETINOPATHY: POSITIVE
RIGHT EYE DIABETIC RETINOPATHY: POSITIVE

## 2023-09-13 DIAGNOSIS — E78.2 MIXED HYPERLIPIDEMIA: ICD-10-CM

## 2023-09-13 RX ORDER — SIMVASTATIN 80 MG
TABLET ORAL
Qty: 90 TABLET | Refills: 3 | Status: SHIPPED | OUTPATIENT
Start: 2023-09-13

## 2023-10-04 DIAGNOSIS — Z79.4 TYPE 2 DIABETES MELLITUS WITH STAGE 3A CHRONIC KIDNEY DISEASE, WITH LONG-TERM CURRENT USE OF INSULIN (HCC): ICD-10-CM

## 2023-10-04 DIAGNOSIS — N18.31 TYPE 2 DIABETES MELLITUS WITH STAGE 3A CHRONIC KIDNEY DISEASE, WITH LONG-TERM CURRENT USE OF INSULIN (HCC): ICD-10-CM

## 2023-10-04 DIAGNOSIS — E11.22 TYPE 2 DIABETES MELLITUS WITH STAGE 3A CHRONIC KIDNEY DISEASE, WITH LONG-TERM CURRENT USE OF INSULIN (HCC): ICD-10-CM

## 2023-12-06 ENCOUNTER — APPOINTMENT (OUTPATIENT)
Dept: LAB | Facility: CLINIC | Age: 88
End: 2023-12-06
Payer: COMMERCIAL

## 2023-12-06 DIAGNOSIS — I10 PRIMARY HYPERTENSION: ICD-10-CM

## 2023-12-06 DIAGNOSIS — Z79.4 TYPE 2 DIABETES MELLITUS WITH STAGE 3B CHRONIC KIDNEY DISEASE, WITH LONG-TERM CURRENT USE OF INSULIN (HCC): ICD-10-CM

## 2023-12-06 DIAGNOSIS — N18.32 TYPE 2 DIABETES MELLITUS WITH STAGE 3B CHRONIC KIDNEY DISEASE, WITH LONG-TERM CURRENT USE OF INSULIN (HCC): ICD-10-CM

## 2023-12-06 DIAGNOSIS — E11.22 TYPE 2 DIABETES MELLITUS WITH STAGE 3B CHRONIC KIDNEY DISEASE, WITH LONG-TERM CURRENT USE OF INSULIN (HCC): ICD-10-CM

## 2023-12-06 DIAGNOSIS — E78.2 MIXED HYPERLIPIDEMIA: ICD-10-CM

## 2023-12-06 LAB
ALBUMIN SERPL BCP-MCNC: 4.1 G/DL (ref 3.5–5)
ALP SERPL-CCNC: 64 U/L (ref 34–104)
ALT SERPL W P-5'-P-CCNC: 12 U/L (ref 7–52)
ANION GAP SERPL CALCULATED.3IONS-SCNC: 7 MMOL/L
AST SERPL W P-5'-P-CCNC: 23 U/L (ref 13–39)
BILIRUB SERPL-MCNC: 0.69 MG/DL (ref 0.2–1)
BUN SERPL-MCNC: 28 MG/DL (ref 5–25)
CALCIUM SERPL-MCNC: 9.7 MG/DL (ref 8.4–10.2)
CHLORIDE SERPL-SCNC: 104 MMOL/L (ref 96–108)
CHOLEST SERPL-MCNC: 137 MG/DL
CO2 SERPL-SCNC: 31 MMOL/L (ref 21–32)
CREAT SERPL-MCNC: 1.18 MG/DL (ref 0.6–1.3)
CREAT UR-MCNC: 97.5 MG/DL
ERYTHROCYTE [DISTWIDTH] IN BLOOD BY AUTOMATED COUNT: 12.3 % (ref 11.6–15.1)
EST. AVERAGE GLUCOSE BLD GHB EST-MCNC: 151 MG/DL
GFR SERPL CREATININE-BSD FRML MDRD: 41 ML/MIN/1.73SQ M
GLUCOSE P FAST SERPL-MCNC: 92 MG/DL (ref 65–99)
HBA1C MFR BLD: 6.9 %
HCT VFR BLD AUTO: 43 % (ref 34.8–46.1)
HDLC SERPL-MCNC: 39 MG/DL
HGB BLD-MCNC: 13.9 G/DL (ref 11.5–15.4)
LDLC SERPL CALC-MCNC: 69 MG/DL (ref 0–100)
MCH RBC QN AUTO: 30.5 PG (ref 26.8–34.3)
MCHC RBC AUTO-ENTMCNC: 32.3 G/DL (ref 31.4–37.4)
MCV RBC AUTO: 95 FL (ref 82–98)
MICROALBUMIN UR-MCNC: 9.4 MG/L
MICROALBUMIN/CREAT 24H UR: 10 MG/G CREATININE (ref 0–30)
NONHDLC SERPL-MCNC: 98 MG/DL
PLATELET # BLD AUTO: 237 THOUSANDS/UL (ref 149–390)
PMV BLD AUTO: 10.4 FL (ref 8.9–12.7)
POTASSIUM SERPL-SCNC: 4.5 MMOL/L (ref 3.5–5.3)
PROT SERPL-MCNC: 6.8 G/DL (ref 6.4–8.4)
RBC # BLD AUTO: 4.55 MILLION/UL (ref 3.81–5.12)
SODIUM SERPL-SCNC: 142 MMOL/L (ref 135–147)
TRIGL SERPL-MCNC: 145 MG/DL
WBC # BLD AUTO: 9.21 THOUSAND/UL (ref 4.31–10.16)

## 2023-12-06 PROCEDURE — 80061 LIPID PANEL: CPT

## 2023-12-06 PROCEDURE — 85027 COMPLETE CBC AUTOMATED: CPT

## 2023-12-06 PROCEDURE — 82043 UR ALBUMIN QUANTITATIVE: CPT

## 2023-12-06 PROCEDURE — 80053 COMPREHEN METABOLIC PANEL: CPT

## 2023-12-06 PROCEDURE — 83036 HEMOGLOBIN GLYCOSYLATED A1C: CPT

## 2023-12-06 PROCEDURE — 36415 COLL VENOUS BLD VENIPUNCTURE: CPT

## 2023-12-06 PROCEDURE — 82570 ASSAY OF URINE CREATININE: CPT

## 2023-12-21 ENCOUNTER — OFFICE VISIT (OUTPATIENT)
Dept: FAMILY MEDICINE CLINIC | Facility: CLINIC | Age: 88
End: 2023-12-21
Payer: COMMERCIAL

## 2023-12-21 VITALS
BODY MASS INDEX: 30.59 KG/M2 | HEIGHT: 60 IN | WEIGHT: 155.8 LBS | OXYGEN SATURATION: 97 % | SYSTOLIC BLOOD PRESSURE: 140 MMHG | TEMPERATURE: 97.9 F | RESPIRATION RATE: 18 BRPM | DIASTOLIC BLOOD PRESSURE: 78 MMHG | HEART RATE: 84 BPM

## 2023-12-21 DIAGNOSIS — E78.2 MIXED HYPERLIPIDEMIA: ICD-10-CM

## 2023-12-21 DIAGNOSIS — E55.9 VITAMIN D DEFICIENCY: ICD-10-CM

## 2023-12-21 DIAGNOSIS — Z00.00 MEDICARE ANNUAL WELLNESS VISIT, SUBSEQUENT: ICD-10-CM

## 2023-12-21 DIAGNOSIS — Z79.4 TYPE 2 DIABETES MELLITUS WITH STAGE 3B CHRONIC KIDNEY DISEASE, WITH LONG-TERM CURRENT USE OF INSULIN (HCC): Primary | ICD-10-CM

## 2023-12-21 DIAGNOSIS — E11.22 TYPE 2 DIABETES MELLITUS WITH STAGE 3B CHRONIC KIDNEY DISEASE, WITH LONG-TERM CURRENT USE OF INSULIN (HCC): Primary | ICD-10-CM

## 2023-12-21 DIAGNOSIS — I10 PRIMARY HYPERTENSION: ICD-10-CM

## 2023-12-21 DIAGNOSIS — N18.32 TYPE 2 DIABETES MELLITUS WITH STAGE 3B CHRONIC KIDNEY DISEASE, WITH LONG-TERM CURRENT USE OF INSULIN (HCC): Primary | ICD-10-CM

## 2023-12-21 PROBLEM — L60.3 NAIL DYSTROPHY: Status: ACTIVE | Noted: 2023-10-12

## 2023-12-21 PROCEDURE — G0439 PPPS, SUBSEQ VISIT: HCPCS | Performed by: FAMILY MEDICINE

## 2023-12-21 PROCEDURE — 99214 OFFICE O/P EST MOD 30 MIN: CPT | Performed by: FAMILY MEDICINE

## 2023-12-21 NOTE — ASSESSMENT & PLAN NOTE
Lab Results   Component Value Date    HGBA1C 6.9 (H) 12/06/2023     Controlled. Continue levemir 34u bid and lispro 9/10/10u with meals.

## 2023-12-21 NOTE — PROGRESS NOTES
Assessment and Plan:     Problem List Items Addressed This Visit          Endocrine    Type 2 diabetes mellitus with stage 3b chronic kidney disease, with long-term current use of insulin (HCC) - Primary       Lab Results   Component Value Date    HGBA1C 6.9 (H) 12/06/2023     Controlled. Continue levemir 34u bid and lispro 9/10/10u with meals.          Relevant Orders    Comprehensive metabolic panel    Hemoglobin A1C    Lipid panel       Cardiovascular and Mediastinum    Hypertension     Controlled. DASH diet. Continue losartan 100mg QD.          Relevant Orders    Comprehensive metabolic panel    Hemoglobin A1C    Lipid panel       Other    Hyperlipidemia     12/2023 lipid good. Low fat diet. Continue simvastatin 80mg qhs.          Relevant Orders    Comprehensive metabolic panel    Hemoglobin A1C    Lipid panel    Vitamin D deficiency     Continue vit D supplement.           Other Visit Diagnoses       Medicare annual wellness visit, subsequent                  Depression Screening and Follow-up Plan: Patient was screened for depression during today's encounter. They screened negative with a PHQ-2 score of 0.      Reviewed lab in 12/2023  hgA1C 6.9 controlled  CBC normal  CMP ok, GFR 41 stable  Lipid 137/145/39/69 good  A/c 10 good    Got flu shot yearly.   Got covid19 vaccines and boosters.   Got prevnar 4/2015. Got pneumovax 2016.   Got shingrix 2019.  Fall precautions.   RTO in 6 months with labs.     POA---daughter  Living will---DNR per pt.          Preventive health issues were discussed with patient, and age appropriate screening tests were ordered as noted in patient's After Visit Summary.  Personalized health advice and appropriate referrals for health education or preventive services given if needed, as noted in patient's After Visit Summary.     History of Present Illness:     Patient presents for a Medicare Wellness Visit    HPI     Pt is here by herself.      DM---12/2023 hgA1C 6.9 controlled.    She is on levemir 34u bid.   She is on lispro 9u/10u/10u with meals.    Had hypoglycemia after exercise rarely.   Denies neuropathy.   FU opthalmology Dr. Sarmiento and Dr. North. Right eye cannot see. FU retinal specialist also per pt.   FU podiatry Dr. Canela Q 3-4 months.     HTN----She is on losartan 100mg QD. Denies SOB, CP, headache etc. BP at home 140/80 per pt.      Hyperlipidemia----she is on simvastatin 80mg qhs. Denies side effects.     Vit D deficiency---She is on vitD supplement.      She went to [employer] exercise 4 times/week.    Pt lives by herself. Does all ADL's. Still driving and cooking.   Had children living close to her.  Denies recent falls.   Denies depression.       Patient Care Team:  Nimesh Napier MD as PCP - General  Ernesto Canela DPM (Podiatry)  Bertram Sidhu MD (Ophthalmology)  Stewart Le MD (Ophthalmology)     Review of Systems:     Review of Systems   Constitutional:  Negative for appetite change, chills and fever.   HENT:  Negative for congestion, ear pain, sinus pain and sore throat.    Eyes:  Negative for discharge and itching.   Respiratory:  Negative for apnea, cough, chest tightness, shortness of breath and wheezing.    Cardiovascular:  Negative for chest pain, palpitations and leg swelling.   Gastrointestinal:  Negative for abdominal pain, anal bleeding, constipation, diarrhea, nausea and vomiting.   Endocrine: Negative for cold intolerance, heat intolerance and polyuria.   Genitourinary:  Negative for difficulty urinating and dysuria.   Musculoskeletal:  Negative for arthralgias, back pain and myalgias.   Skin:  Negative for rash.   Neurological:  Negative for dizziness and headaches.   Psychiatric/Behavioral:  Negative for agitation.         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)    Onychomycosis due to dermatophyte    Nail dystrophy      Past Medical and Surgical History:     Past Medical History:   Diagnosis Date    Carpal tunnel syndrome     Choledocholithiasis     resolved 3/6/2015    Diverticulosis     Inguinal hernia     Sciatica      Past Surgical History:   Procedure Laterality Date    ESOPHAGOGASTRODUODENOSCOPY      resolved 1/2002    HEMORRHOID SURGERY      resolved 2/1999    NEUROPLASTY / TRANSPOSITION MEDIAN NERVE AT CARPAL TUNNEL Right     last assessed 8/10/2012    RECTAL SURGERY        Family History:     Family History   Problem Relation Age of Onset    Heart attack Mother         MI    Coronary artery disease Mother     Diabetes Mother         DM    Hypertension Mother     Stroke Mother         syndrome    Stroke Father         syndrome      Social History:     Social History     Socioeconomic History    Marital status:      Spouse name: None    Number of children: None    Years of education: None    Highest education level: None   Occupational History    Occupation: retired   Tobacco Use    Smoking status: Never    Smokeless tobacco: Never   Vaping Use    Vaping status: Never Used   Substance and Sexual Activity    Alcohol use: No    Drug use: No    Sexual activity: None   Other Topics Concern    None   Social History Narrative    Always uses seat belt          Social Determinants of Health     Financial Resource Strain: Low Risk  (12/14/2023)    Overall Financial Resource Strain (CARDIA)     Difficulty of Paying Living Expenses: Not hard at all   Food Insecurity: Not on file   Transportation Needs: No Transportation Needs (12/14/2023)    PRAPARE - Transportation     Lack of Transportation (Medical): No      Lack of Transportation (Non-Medical): No   Physical Activity: Not on file   Stress: Not on file   Social Connections: Not on file   Intimate Partner Violence: Not on file   Housing Stability: Not on file      Medications and Allergies:     Current Outpatient Medications   Medication Sig Dispense Refill    aspirin 81 mg chewable tablet Chew 81 mg daily      brimonidine (ALPHAGAN P) 0.1 % Apply to eye      Calcium Carbonate-Vit D-Min (CALCIUM 1200) 7963-9115 MG-UNIT CHEW Chew      Cholecalciferol (VITAMIN D3) 5000 UNIT/ML LIQD Take by mouth      insulin detemir (Levemir FlexPen) 100 Units/mL injection pen 34u am and 34u pm 75 mL 1    insulin lispro (HumaLOG KwikPen) 100 units/mL injection pen Inject 10 Units under the skin 3 (three) times a day with meals And ISS 90 mL 1    Insulin Pen Needle 32G X 4 MM MISC Use 4 (four) times a day 400 each 0    latanoprost (XALATAN) 0.005 % ophthalmic solution Apply to eye      losartan (COZAAR) 100 MG tablet TAKE 1 TABLET BY MOUTH EVERY DAY 90 tablet 3    OneTouch Delica Lancets 33G MISC by Other route 3 (three) times a day Check blood sugar 4 times per day 300 each 3    OneTouch Verio test strip USE 1 EACH 3 TIMES A DAY AS DIRECTED 300 strip 1    simvastatin (ZOCOR) 80 mg tablet TAKE 1 TABLET BY MOUTH EVERY DAY 90 tablet 3     No current facility-administered medications for this visit.     Allergies   Allergen Reactions    Ace Inhibitors Cough      Immunizations:     Immunization History   Administered Date(s) Administered    COVID-19 PFIZER VACCINE 0.3 ML IM 01/28/2021, 02/17/2021, 10/09/2021    COVID-19 Pfizer Vac BIVALENT Carlitos-sucrose 12 Yr+ IM 10/05/2022    COVID-19 Pfizer vac (Carlitos-sucrose, gray cap) 12 yr+ IM 04/20/2022    INFLUENZA 12/03/2004, 11/12/2018, 11/08/2019, 10/21/2021, 10/21/2022    Influenza Split High Dose Preservative Free IM 12/29/2014, 11/08/2019    Influenza, high dose seasonal 0.7 mL 10/09/2018, 10/19/2023    Influenza, seasonal, injectable 10/06/2010,  11/08/2012, 11/12/2018, 11/08/2019    Pneumococcal Conjugate 13-Valent 04/29/2015    Pneumococcal Polysaccharide PPV23 11/01/2002, 11/10/2016    Tdap 01/01/2000    Zoster Vaccine Recombinant 07/05/2019, 10/08/2019      Health Maintenance:         Topic Date Due    DXA SCAN  09/10/2024         Topic Date Due    COVID-19 Vaccine (6 - 2023-24 season) 09/01/2023      Medicare Screening Tests and Risk Assessments:     Lachelle is here for her Subsequent Wellness visit.     Health Risk Assessment:   Patient rates overall health as good. Patient feels that their physical health rating is same. Patient is satisfied with their life. Eyesight was rated as same. Hearing was rated as same. Patient feels that their emotional and mental health rating is same. Patients states they are never, rarely angry. Patient states they are never, rarely unusually tired/fatigued. Pain experienced in the last 7 days has been some. Patient's pain rating has been 1/10. Patient states that she has experienced no weight loss or gain in last 6 months.     Depression Screening:   PHQ-2 Score: 0      Fall Risk Screening:   In the past year, patient has experienced: no history of falling in past year      Urinary Incontinence Screening:   Patient has not leaked urine accidently in the last six months.     Home Safety:  Patient does not have trouble with stairs inside or outside of their home. Patient has working smoke alarms and has no working carbon monoxide detector. Home safety hazards include: none.     Nutrition:   Current diet is Diabetic, Low Cholesterol, Low Saturated Fat, Low Carb and No Added Salt.     Medications:   Patient is not currently taking any over-the-counter supplements. Patient is able to manage medications.     Activities of Daily Living (ADLs)/Instrumental Activities of Daily Living (IADLs):   Walk and transfer into and out of bed and chair?: Yes  Dress and groom yourself?: Yes    Bathe or shower yourself?: Yes    Feed yourself?  Yes  Do your laundry/housekeeping?: Yes  Manage your money, pay your bills and track your expenses?: Yes  Make your own meals?: Yes    Do your own shopping?: Yes    Previous Hospitalizations:   Any hospitalizations or ED visits within the last 12 months?: No      Advance Care Planning:   Living will: Yes    Durable POA for healthcare: Yes    Advanced directive: Yes    End of Life Decisions reviewed with patient: Yes    Provider agrees with end of life decisions: Yes      Cognitive Screening:   Provider or family/friend/caregiver concerned regarding cognition?: No    PREVENTIVE SCREENINGS      Cardiovascular Screening:    General: Screening Not Indicated and History Lipid Disorder      Diabetes Screening:     General: Screening Not Indicated and History Diabetes      Colorectal Cancer Screening:     General: Screening Not Indicated      Breast Cancer Screening:     General: Screening Not Indicated      Cervical Cancer Screening:    General: Screening Not Indicated      Osteoporosis Screening:    General: Screening Current      Lung Cancer Screening:     General: Screening Not Indicated    Screening, Brief Intervention, and Referral to Treatment (SBIRT)    Screening  Typical number of drinks in a day: 0  Typical number of drinks in a week: 0  Interpretation: Low risk drinking behavior.    AUDIT-C Screenin) How often did you have a drink containing alcohol in the past year? never  2) How many drinks did you have on a typical day when you were drinking in the past year? 0  3) How often did you have 6 or more drinks on one occasion in the past year? never    AUDIT-C Score: 0  Interpretation: Score 0-2 (female): Negative screen for alcohol misuse    Single Item Drug Screening:  How often have you used an illegal drug (including marijuana) or a prescription medication for non-medical reasons in the past year? never    Single Item Drug Screen Score: 0  Interpretation: Negative screen for possible drug use  disorder    No results found.     Physical Exam:     /78   Pulse 84   Temp 97.9 °F (36.6 °C) (Tympanic)   Resp 18   Ht 5' (1.524 m)   Wt 70.7 kg (155 lb 12.8 oz)   SpO2 97%   BMI 30.43 kg/m²     Physical Exam  Vitals reviewed.   Constitutional:       Appearance: Normal appearance.   HENT:      Head: Normocephalic and atraumatic.   Eyes:      General:         Right eye: No discharge.         Left eye: No discharge.      Conjunctiva/sclera: Conjunctivae normal.   Neck:      Vascular: No carotid bruit.   Cardiovascular:      Rate and Rhythm: Normal rate and regular rhythm.      Heart sounds: Normal heart sounds. No murmur heard.     No friction rub. No gallop.   Pulmonary:      Effort: Pulmonary effort is normal. No respiratory distress.      Breath sounds: Normal breath sounds. No wheezing or rales.   Abdominal:      General: Bowel sounds are normal. There is no distension.      Palpations: Abdomen is soft.      Tenderness: There is no abdominal tenderness.   Musculoskeletal:         General: No swelling, tenderness or deformity. Normal range of motion.      Cervical back: Normal range of motion and neck supple. No muscular tenderness.   Lymphadenopathy:      Cervical: No cervical adenopathy.   Neurological:      Mental Status: She is alert.   Psychiatric:         Mood and Affect: Mood normal.          Nimesh Napier MD

## 2023-12-28 DIAGNOSIS — N18.32 TYPE 2 DIABETES MELLITUS WITH STAGE 3B CHRONIC KIDNEY DISEASE, WITH LONG-TERM CURRENT USE OF INSULIN (HCC): ICD-10-CM

## 2023-12-28 DIAGNOSIS — E11.22 TYPE 2 DIABETES MELLITUS WITH STAGE 3B CHRONIC KIDNEY DISEASE, WITH LONG-TERM CURRENT USE OF INSULIN (HCC): ICD-10-CM

## 2023-12-28 DIAGNOSIS — Z79.4 TYPE 2 DIABETES MELLITUS WITH STAGE 3B CHRONIC KIDNEY DISEASE, WITH LONG-TERM CURRENT USE OF INSULIN (HCC): ICD-10-CM

## 2023-12-28 RX ORDER — INSULIN DETEMIR 100 [IU]/ML
INJECTION, SOLUTION SUBCUTANEOUS
Qty: 75 ML | Refills: 1 | Status: SHIPPED | OUTPATIENT
Start: 2023-12-28

## 2023-12-31 ENCOUNTER — VBI (OUTPATIENT)
Dept: ADMINISTRATIVE | Facility: OTHER | Age: 88
End: 2023-12-31

## 2024-02-14 DIAGNOSIS — E11.65 TYPE 2 DIABETES MELLITUS WITH HYPERGLYCEMIA, WITH LONG-TERM CURRENT USE OF INSULIN (HCC): ICD-10-CM

## 2024-02-14 DIAGNOSIS — Z79.4 TYPE 2 DIABETES MELLITUS WITH HYPERGLYCEMIA, WITH LONG-TERM CURRENT USE OF INSULIN (HCC): ICD-10-CM

## 2024-02-14 RX ORDER — BLOOD SUGAR DIAGNOSTIC
STRIP MISCELLANEOUS
Qty: 300 STRIP | Refills: 1 | Status: SHIPPED | OUTPATIENT
Start: 2024-02-14

## 2024-04-10 DIAGNOSIS — I10 PRIMARY HYPERTENSION: ICD-10-CM

## 2024-04-10 RX ORDER — LOSARTAN POTASSIUM 100 MG/1
TABLET ORAL
Qty: 90 TABLET | Refills: 3 | Status: SHIPPED | OUTPATIENT
Start: 2024-04-10

## 2024-04-22 LAB
LEFT EYE DIABETIC RETINOPATHY: POSITIVE
RIGHT EYE DIABETIC RETINOPATHY: NORMAL

## 2024-04-29 DIAGNOSIS — Z79.4 TYPE 2 DIABETES MELLITUS WITH STAGE 3B CHRONIC KIDNEY DISEASE, WITH LONG-TERM CURRENT USE OF INSULIN (HCC): ICD-10-CM

## 2024-04-29 DIAGNOSIS — N18.32 TYPE 2 DIABETES MELLITUS WITH STAGE 3B CHRONIC KIDNEY DISEASE, WITH LONG-TERM CURRENT USE OF INSULIN (HCC): ICD-10-CM

## 2024-04-29 DIAGNOSIS — E11.22 TYPE 2 DIABETES MELLITUS WITH STAGE 3B CHRONIC KIDNEY DISEASE, WITH LONG-TERM CURRENT USE OF INSULIN (HCC): ICD-10-CM

## 2024-04-29 RX ORDER — INSULIN LISPRO 100 [IU]/ML
10 INJECTION, SOLUTION INTRAVENOUS; SUBCUTANEOUS
Qty: 30 ML | Refills: 1 | Status: SHIPPED | OUTPATIENT
Start: 2024-04-29

## 2024-06-17 ENCOUNTER — LAB (OUTPATIENT)
Dept: LAB | Facility: CLINIC | Age: 89
End: 2024-06-17
Payer: COMMERCIAL

## 2024-06-17 DIAGNOSIS — E11.22 TYPE 2 DIABETES MELLITUS WITH STAGE 3B CHRONIC KIDNEY DISEASE, WITH LONG-TERM CURRENT USE OF INSULIN (HCC): ICD-10-CM

## 2024-06-17 DIAGNOSIS — N18.32 TYPE 2 DIABETES MELLITUS WITH STAGE 3B CHRONIC KIDNEY DISEASE, WITH LONG-TERM CURRENT USE OF INSULIN (HCC): ICD-10-CM

## 2024-06-17 DIAGNOSIS — E78.2 MIXED HYPERLIPIDEMIA: ICD-10-CM

## 2024-06-17 DIAGNOSIS — I10 PRIMARY HYPERTENSION: ICD-10-CM

## 2024-06-17 DIAGNOSIS — Z79.4 TYPE 2 DIABETES MELLITUS WITH STAGE 3B CHRONIC KIDNEY DISEASE, WITH LONG-TERM CURRENT USE OF INSULIN (HCC): ICD-10-CM

## 2024-06-17 LAB
ALBUMIN SERPL BCP-MCNC: 4 G/DL (ref 3.5–5)
ALP SERPL-CCNC: 66 U/L (ref 34–104)
ALT SERPL W P-5'-P-CCNC: 9 U/L (ref 7–52)
ANION GAP SERPL CALCULATED.3IONS-SCNC: 6 MMOL/L (ref 4–13)
AST SERPL W P-5'-P-CCNC: 16 U/L (ref 13–39)
BILIRUB SERPL-MCNC: 0.57 MG/DL (ref 0.2–1)
BUN SERPL-MCNC: 20 MG/DL (ref 5–25)
CALCIUM SERPL-MCNC: 9.9 MG/DL (ref 8.4–10.2)
CHLORIDE SERPL-SCNC: 105 MMOL/L (ref 96–108)
CHOLEST SERPL-MCNC: 155 MG/DL
CO2 SERPL-SCNC: 30 MMOL/L (ref 21–32)
CREAT SERPL-MCNC: 1.17 MG/DL (ref 0.6–1.3)
EST. AVERAGE GLUCOSE BLD GHB EST-MCNC: 166 MG/DL
GFR SERPL CREATININE-BSD FRML MDRD: 41 ML/MIN/1.73SQ M
GLUCOSE P FAST SERPL-MCNC: 100 MG/DL (ref 65–99)
HBA1C MFR BLD: 7.4 %
HDLC SERPL-MCNC: 37 MG/DL
LDLC SERPL CALC-MCNC: 84 MG/DL (ref 0–100)
NONHDLC SERPL-MCNC: 118 MG/DL
POTASSIUM SERPL-SCNC: 4.7 MMOL/L (ref 3.5–5.3)
PROT SERPL-MCNC: 6.7 G/DL (ref 6.4–8.4)
SODIUM SERPL-SCNC: 141 MMOL/L (ref 135–147)
TRIGL SERPL-MCNC: 171 MG/DL

## 2024-06-17 PROCEDURE — 83036 HEMOGLOBIN GLYCOSYLATED A1C: CPT

## 2024-06-17 PROCEDURE — 36415 COLL VENOUS BLD VENIPUNCTURE: CPT

## 2024-06-17 PROCEDURE — 80061 LIPID PANEL: CPT

## 2024-06-17 PROCEDURE — 80053 COMPREHEN METABOLIC PANEL: CPT

## 2024-06-25 ENCOUNTER — OFFICE VISIT (OUTPATIENT)
Dept: FAMILY MEDICINE CLINIC | Facility: CLINIC | Age: 89
End: 2024-06-25
Payer: COMMERCIAL

## 2024-06-25 ENCOUNTER — TELEPHONE (OUTPATIENT)
Dept: ADMINISTRATIVE | Facility: OTHER | Age: 89
End: 2024-06-25

## 2024-06-25 VITALS
BODY MASS INDEX: 30.74 KG/M2 | RESPIRATION RATE: 16 BRPM | HEART RATE: 64 BPM | SYSTOLIC BLOOD PRESSURE: 140 MMHG | DIASTOLIC BLOOD PRESSURE: 68 MMHG | OXYGEN SATURATION: 98 % | TEMPERATURE: 97.4 F | HEIGHT: 60 IN | WEIGHT: 156.6 LBS

## 2024-06-25 DIAGNOSIS — Z79.4 TYPE 2 DIABETES MELLITUS WITH STAGE 3B CHRONIC KIDNEY DISEASE, WITH LONG-TERM CURRENT USE OF INSULIN (HCC): Primary | ICD-10-CM

## 2024-06-25 DIAGNOSIS — E11.22 TYPE 2 DIABETES MELLITUS WITH STAGE 3B CHRONIC KIDNEY DISEASE, WITH LONG-TERM CURRENT USE OF INSULIN (HCC): Primary | ICD-10-CM

## 2024-06-25 DIAGNOSIS — N18.32 TYPE 2 DIABETES MELLITUS WITH STAGE 3B CHRONIC KIDNEY DISEASE, WITH LONG-TERM CURRENT USE OF INSULIN (HCC): Primary | ICD-10-CM

## 2024-06-25 DIAGNOSIS — E55.9 VITAMIN D DEFICIENCY: ICD-10-CM

## 2024-06-25 DIAGNOSIS — E78.2 MIXED HYPERLIPIDEMIA: ICD-10-CM

## 2024-06-25 DIAGNOSIS — I10 PRIMARY HYPERTENSION: ICD-10-CM

## 2024-06-25 PROCEDURE — 99214 OFFICE O/P EST MOD 30 MIN: CPT | Performed by: FAMILY MEDICINE

## 2024-06-25 PROCEDURE — G2211 COMPLEX E/M VISIT ADD ON: HCPCS | Performed by: FAMILY MEDICINE

## 2024-06-25 RX ORDER — INSULIN DEGLUDEC 100 U/ML
34 INJECTION, SOLUTION SUBCUTANEOUS EVERY 12 HOURS SCHEDULED
Start: 2024-06-25

## 2024-06-25 NOTE — TELEPHONE ENCOUNTER
Upon review of the In Basket request we were able to locate, review, and update the patient chart as requested for Diabetic Eye Exam.    Any additional questions or concerns should be emailed to the Practice Liaisons via the appropriate education email address, please do not reply via In Basket.    Thank you  Fili Ortiz MA   [facility]

## 2024-06-25 NOTE — TELEPHONE ENCOUNTER
----- Message from Nimesh Napier MD sent at 6/25/2024  9:39 AM EDT -----  Regarding: eye exam  06/25/24 9:39 AM    Hello, our patient Lachelle Salazar has had Diabetic Eye Exam completed/performed. Please assist in updating the patient chart by pulling the document from the Media Tab. The date of service is 3/8/2024.     Thank you,  Nimesh Napier MD  [facility]

## 2024-06-25 NOTE — ASSESSMENT & PLAN NOTE
Lab Results   Component Value Date    HGBA1C 7.4 (H) 06/17/2024     Controlled for her age. Low carb diet. She got letter recently that levemir wont produce any more. Will change to tresiba. Continue humaglog 10u tid with meals.

## 2024-06-25 NOTE — PROGRESS NOTES
Ambulatory Visit  Name: Lachelle Salazar      : 1935      MRN: [id number]  Encounter Provider: Nimesh Napier MD  Encounter Date: 2024   Encounter department: [facility]    Assessment & Plan   1. Type 2 diabetes mellitus with stage 3b chronic kidney disease, with long-term current use of insulin ([facility])  Assessment & Plan:    Lab Results   Component Value Date    HGBA1C 7.4 (H) 2024     Controlled for her age. Low carb diet. She got letter recently that levemir wont produce any more. Will change to tresiba. Continue humaglog 10u tid with meals.   Orders:  -     insulin degludec (Tresiba FlexTouch) 100 units/mL injection pen; Inject 34 Units under the skin every 12 (twelve) hours  -     Insulin Pen Needle 32G X 4 MM MISC; Use 4 (four) times a day  -     CBC; Future  -     Comprehensive metabolic panel; Future; Expected date: 2024  -     Hemoglobin A1C; Future; Expected date: 2024  -     Lipid panel; Future; Expected date: 2024  -     Albumin / creatinine urine ratio; Future  -     Vitamin D 25 hydroxy; Future; Expected date: 2024  2. Primary hypertension  Assessment & Plan:  DASH diet. Continue losartan 100mg QD.   Orders:  -     CBC; Future  -     Comprehensive metabolic panel; Future; Expected date: 2024  -     Hemoglobin A1C; Future; Expected date: 2024  -     Lipid panel; Future; Expected date: 2024  -     Albumin / creatinine urine ratio; Future  -     Vitamin D 25 hydroxy; Future; Expected date: 2024  3. Mixed hyperlipidemia  Assessment & Plan:  2024 lipid ok. Low fat diet. Continue simvastatin 80mg qhs.   Orders:  -     CBC; Future  -     Comprehensive metabolic panel; Future; Expected date: 2024  -     Hemoglobin A1C; Future; Expected date: 2024  -     Lipid panel; Future; Expected date: 2024  -     Albumin / creatinine urine ratio; Future  -     Vitamin D 25 hydroxy; Future; Expected date: 2024  4.  Vitamin D deficiency  Assessment & Plan:  Continue vit D 5000Iu daily.   Orders:  -     CBC; Future  -     Comprehensive metabolic panel; Future; Expected date: 12/12/2024  -     Hemoglobin A1C; Future; Expected date: 12/12/2024  -     Lipid panel; Future; Expected date: 12/12/2024  -     Albumin / creatinine urine ratio; Future  -     Vitamin D 25 hydroxy; Future; Expected date: 12/12/2024       Reviewed lab in 6/2024  HgA1C 7.4 controlled  CMP ok GFR 41 stable  Lipid 155/171/37/84 good    Got flu shot yearly.   Got covid19 vaccines and boosters.   Got prevnar 4/2015. Got pneumovax 2016.   Got shingrix 2019.  Fall precautions.   RTO in 6 months with labs.         History of Present Illness     HPI    Pt is here by herself.      DM---6/2024 hgA1C 7.4 controlled.   She is on levemir 34u bid. She got letter recently that levemir wont produce any more. Will send tresiba in the future.   She is on lispro 10u with meals.    Had hypoglycemia after exercise rarely.   Denies neuropathy.   FU opthalmology Dr. Sarmiento and Dr. North. Right eye cannot see. FU retinal specialist also per pt.   FU podiatry Dr. Canela Q 3-4 months.     HTN----She is on losartan 100mg QD. Denies SOB, CP, headache etc. She checks BP at home occasionally.     Hyperlipidemia----she is on simvastatin 80mg qhs. Denies side effects.      Vit D deficiency---She is on vitD supplement 5000IU daily.      She went to [employer] exercise 4 times/week.    Pt lives by herself. Does all ADL's. Still driving and cooking.   Had children living close to her.  Denies recent falls.   Denies depression.       Review of Systems   Constitutional:  Negative for appetite change, chills and fever.   HENT:  Negative for congestion, ear pain, sinus pain and sore throat.    Eyes:  Negative for discharge and itching.   Respiratory:  Negative for apnea, cough, chest tightness, shortness of breath and wheezing.    Cardiovascular:  Negative for chest pain, palpitations and  leg swelling.   Gastrointestinal:  Negative for abdominal pain, anal bleeding, constipation, diarrhea, nausea and vomiting.   Endocrine: Negative for cold intolerance, heat intolerance and polyuria.   Genitourinary:  Negative for difficulty urinating and dysuria.   Musculoskeletal:  Negative for arthralgias, back pain and myalgias.   Skin:  Negative for rash.   Neurological:  Negative for dizziness and headaches.   Psychiatric/Behavioral:  Negative for agitation.        Objective     /68   Pulse 64   Temp (!) 97.4 °F (36.3 °C) (Tympanic)   Resp 16   Ht 5' (1.524 m)   Wt 71 kg (156 lb 9.6 oz)   SpO2 98%   BMI 30.58 kg/m²     Physical Exam  Constitutional:       General: She is not in acute distress.     Appearance: She is well-developed.   HENT:      Head: Normocephalic.   Eyes:      General:         Right eye: No discharge.         Left eye: No discharge.      Conjunctiva/sclera: Conjunctivae normal.   Neck:      Thyroid: No thyromegaly.   Cardiovascular:      Rate and Rhythm: Normal rate and regular rhythm.      Heart sounds: Normal heart sounds. No murmur heard.     No friction rub. No gallop.   Pulmonary:      Effort: Pulmonary effort is normal. No respiratory distress.      Breath sounds: Normal breath sounds. No wheezing or rales.   Chest:      Chest wall: No tenderness.   Abdominal:      General: Bowel sounds are normal. There is no distension.      Palpations: Abdomen is soft. There is no mass.      Tenderness: There is no abdominal tenderness. There is no guarding or rebound.   Musculoskeletal:         General: No tenderness or deformity. Normal range of motion.      Cervical back: Normal range of motion.   Lymphadenopathy:      Cervical: No cervical adenopathy.   Neurological:      Mental Status: She is alert.

## 2024-08-08 DIAGNOSIS — E11.65 TYPE 2 DIABETES MELLITUS WITH HYPERGLYCEMIA, WITH LONG-TERM CURRENT USE OF INSULIN (HCC): ICD-10-CM

## 2024-08-08 DIAGNOSIS — Z79.4 TYPE 2 DIABETES MELLITUS WITH HYPERGLYCEMIA, WITH LONG-TERM CURRENT USE OF INSULIN (HCC): ICD-10-CM

## 2024-08-08 RX ORDER — BLOOD SUGAR DIAGNOSTIC
STRIP MISCELLANEOUS
Qty: 300 STRIP | Refills: 1 | Status: SHIPPED | OUTPATIENT
Start: 2024-08-08

## 2024-08-13 DIAGNOSIS — E11.22 TYPE 2 DIABETES MELLITUS WITH STAGE 3B CHRONIC KIDNEY DISEASE, WITH LONG-TERM CURRENT USE OF INSULIN (HCC): ICD-10-CM

## 2024-08-13 DIAGNOSIS — N18.32 TYPE 2 DIABETES MELLITUS WITH STAGE 3B CHRONIC KIDNEY DISEASE, WITH LONG-TERM CURRENT USE OF INSULIN (HCC): ICD-10-CM

## 2024-08-13 DIAGNOSIS — Z79.4 TYPE 2 DIABETES MELLITUS WITH STAGE 3B CHRONIC KIDNEY DISEASE, WITH LONG-TERM CURRENT USE OF INSULIN (HCC): ICD-10-CM

## 2024-08-13 NOTE — TELEPHONE ENCOUNTER
Medication: insulin degludec (Tresiba FlexTouch) 100 units/mL injection pen     Dose/Frequency: Inject 34 Units under the skin every 12 (twelve) hours     Quantity: ?    Pharmacy: [facility]/pharmacy #[id number]  [place] - [address]     Office:   [x] PCP/Provider - Nimesh Napier MD   [] Speciality/Provider -     Does the patient have enough for 3 days?   [x] Yes   [] No - Send as HP to POD    Medication: insulin lispro (HumaLOG) 100 units/mL injection pen     Dose/Frequency:  INJECT 10 UNITS UNDER THE SKIN 3 (THREE) TIMES A DAY WITH MEALS AND ISS     Quantity: 30 mL     Pharmacy: [facility]/pharmacy #[id number]  [place] - [address]     Office:   [x] PCP/Provider - Nimesh Napier MD   [] Speciality/Provider -     Does the patient have enough for 3 days?   [x] Yes   [] No - Send as HP to POD

## 2024-08-14 RX ORDER — INSULIN LISPRO 100 [IU]/ML
10 INJECTION, SOLUTION INTRAVENOUS; SUBCUTANEOUS
Qty: 30 ML | Refills: 1 | Status: SHIPPED | OUTPATIENT
Start: 2024-08-14

## 2024-08-14 RX ORDER — INSULIN DEGLUDEC 100 U/ML
34 INJECTION, SOLUTION SUBCUTANEOUS EVERY 12 HOURS SCHEDULED
Qty: 15 ML | Refills: 1 | Status: SHIPPED | OUTPATIENT
Start: 2024-08-14

## 2024-09-04 ENCOUNTER — TELEPHONE (OUTPATIENT)
Age: 89
End: 2024-09-04

## 2024-09-04 DIAGNOSIS — E11.22 TYPE 2 DIABETES MELLITUS WITH STAGE 3B CHRONIC KIDNEY DISEASE, WITH LONG-TERM CURRENT USE OF INSULIN (HCC): ICD-10-CM

## 2024-09-04 DIAGNOSIS — Z79.4 TYPE 2 DIABETES MELLITUS WITH STAGE 3B CHRONIC KIDNEY DISEASE, WITH LONG-TERM CURRENT USE OF INSULIN (HCC): ICD-10-CM

## 2024-09-04 DIAGNOSIS — N18.32 TYPE 2 DIABETES MELLITUS WITH STAGE 3B CHRONIC KIDNEY DISEASE, WITH LONG-TERM CURRENT USE OF INSULIN (HCC): ICD-10-CM

## 2024-09-04 RX ORDER — INSULIN DEGLUDEC 100 U/ML
30 INJECTION, SOLUTION SUBCUTANEOUS EVERY 12 HOURS SCHEDULED
Qty: 45 ML | Refills: 1 | Status: SHIPPED | OUTPATIENT
Start: 2024-09-04

## 2024-09-04 RX ORDER — INSULIN DEGLUDEC 100 U/ML
34 INJECTION, SOLUTION SUBCUTANEOUS EVERY 12 HOURS SCHEDULED
Qty: 45 ML | Refills: 1 | Status: SHIPPED | OUTPATIENT
Start: 2024-09-04 | End: 2024-09-04 | Stop reason: SDUPTHER

## 2024-09-04 NOTE — TELEPHONE ENCOUNTER
Pt called stating she is only prescribed a one month supply of insulin degludec (Tresiba FlexTouch) 100 units/mL injection pen. She would like to get that in a 90 day supply if possible. The script is ready now at the pharmacy but she is going to wait to  until she hears back about whether or not this can be changed to 90 days.    She also mentioned that her sugars are lower on this medication.      Please advise Lachelle at [phone]

## 2024-09-04 NOTE — TELEPHONE ENCOUNTER
I called pt back. Pt states glucose was 49 after dinner last night. She felt vision change, found glucose was 49. She took glucose tablets and orange juice, felt better. Her fasting glucose was 60-70 recently. She changed levemir to tresibar recently. Advised pt to decrease tresibar from 34u bid to 30u bid. Advised pt to check glucose fasting, 2 hours after meals and call me in 2-3 days.

## 2024-09-04 NOTE — TELEPHONE ENCOUNTER
I changed to 90 days supply. How low was her glucose recently? Any lower than 70? If yes, I need to decrease her insulin dose. Let me know.

## 2024-09-04 NOTE — TELEPHONE ENCOUNTER
Pt states her sugars have been low. Patient states that yesterday after supper her glucose was 49. Other glucose measurements have been 63,66,68,70, 73 that patient mentioned.

## 2024-09-06 NOTE — TELEPHONE ENCOUNTER
Pt called to advise Dr. Napier of the readings for her glucose from Wednesday morning to this afternoon, as following:    Wednesday  6AM - 61  12PM - 82  5PM - 45, yessi DU  7PM - 103  8:15PM - 109, took 30U    Thursday  1:30AM - went to bathroom, 54  2:10AM - 106  7:20AM - 91, took lispro 10U  8:29AM - took second dose of tresibar 30U  12:15PM - 69, lunch at [facility] - took 10U lisipro  5:30PM - 117  8:25PM - 122, took 30U    Friday  6 AM - 61, took 10U lispro  8:30 AM - lispro 30U  10:10 AM - 118  11:30 AM - 106  12 PM - LUNCH 80  1:15 PM - 174    Please contact pt if Dr. Napier needs to discuss any additional information, questions, or concerns.

## 2024-09-06 NOTE — TELEPHONE ENCOUNTER
Patient called to advise that she'll call back this afternoon with her bp readings for Dr Napier.

## 2024-09-06 NOTE — TELEPHONE ENCOUNTER
I called pt back. Glucose are still low. Advised pt only use tresibar 30u in the morning. Hold lispro with meals. Only use lispro per insulin sliding scale if glucose >200. Call me with glucose on Monday. Pt understood.   Pt has insulin sliding scale as below:  Blood sugar 200-249: 2 units bolus Insulin  Blood sugar 250-299: 3 units bolus Insulin  Blood sugar 300-349: 4 units bolus Insulin  Blood sugar Over 350: 5 units bolus Insulin

## 2024-09-09 ENCOUNTER — TELEPHONE (OUTPATIENT)
Age: 89
End: 2024-09-09

## 2024-09-09 NOTE — TELEPHONE ENCOUNTER
Pt was advised to call office to report her Blood Glucose numbers from the weekend:    Saturday 9/7  FBS 6:07am- 68  After breakfast-30 units of Tresiba-waited 2         hours, was 281. Took 3 units of Lispro.  10:48am-136  12:00pm-149 before lunch  1:35pm-was thirsty-blood sugar was 236, gave herself 2 units of Lispro, brought it down at  3:33pm-147  6:00pm-205 before supper (was told not to use Tresiba at supper)  8:00pm-219, gave herself 2 units of Lispro  9:30pm-224 bedtime    Sunday 9/8  FBS 6:56am-62  After breakfast at 9:00am, was 280-gave herself 30 units of Tresiba  10:33am-267  12:11pm-261 before lunch  [id number] herself 4 units of Lispro  6:00pm-Supper 215  7:38rd-120-vqpn herself 2 units Lispro    Monday-9/9  FBS-78  Breakfast 7:00am, at 7:30-gave herself 30 units of Tresiba  9:[id number]-[id number] herself 2 units of Lispro  Before lunch-went to [facility]-12:00pm-129  2:38pm-, 185-gave herself 2 units of Lispro    Pt is questioning the Tresiba. Please call to [phone].

## 2024-09-09 NOTE — TELEPHONE ENCOUNTER
I called pt back. Reviewed glucose numbers. Fasting glucose improved.   Keep same tresibar 30u in the morning. Use lispro 5u with meals and ISS.   Call me back on Thursday for glucose readings.   Pt understood.

## 2024-09-12 NOTE — TELEPHONE ENCOUNTER
I called pt back. Keep tresibar 30u am. Increase lispro to 10u before meals and use ISS. Pt understood.

## 2024-09-12 NOTE — TELEPHONE ENCOUNTER
Glucose readings:    Tuesday 9/10:    Breakfast 7:18am - 97   Took 5 units of Lispro   8:05am - 134  8:20am Took 30 units of Tresiba   Lunch time took 5 units of Lispro  2:30pm - 199  Took 5 units of Lispro before supper - 152   9:28pm - 208 Took 2 units of Lispro    Wednesday 9/11:    Breakfast 6:35am - 132 Took 5 units Lispro before meal  8:05am Took 30 units of Tresiba - 265  Took 5 units of Lispro   12:05pm - 174   Took 5 units of Lispro before supper 6:20pm - 147   9:24pm - 293 Took 3 units of Lispro    Thursday (today)  6:47am - 132 Took 5 units of Lispro  8:20am Took 30 units of Tresiba   10:15am - 250   12pm - 178 Took 5 units of Lispro

## 2024-09-16 DIAGNOSIS — E11.22 TYPE 2 DIABETES MELLITUS WITH STAGE 3B CHRONIC KIDNEY DISEASE, WITH LONG-TERM CURRENT USE OF INSULIN (HCC): ICD-10-CM

## 2024-09-16 DIAGNOSIS — N18.32 TYPE 2 DIABETES MELLITUS WITH STAGE 3B CHRONIC KIDNEY DISEASE, WITH LONG-TERM CURRENT USE OF INSULIN (HCC): ICD-10-CM

## 2024-09-16 DIAGNOSIS — Z79.4 TYPE 2 DIABETES MELLITUS WITH STAGE 3B CHRONIC KIDNEY DISEASE, WITH LONG-TERM CURRENT USE OF INSULIN (HCC): ICD-10-CM

## 2024-09-16 RX ORDER — INSULIN DEGLUDEC 100 U/ML
30 INJECTION, SOLUTION SUBCUTANEOUS DAILY
Qty: 30 ML | Refills: 1
Start: 2024-09-16

## 2024-09-16 NOTE — TELEPHONE ENCOUNTER
I called pt back. Keep tresiba 30u am and lispro 10u with meals. Call office if any problem. Pt understood.

## 2024-09-16 NOTE — TELEPHONE ENCOUNTER
Patient is calling to report her glucose numbers  Saturday 9/14  Breakfast 124 and 10 units of lispro  8:25 am and 10 units of treciba  Lunch 125 and 10 units lispro  Supper 87 and 10 units of lispro  172 at 10 pm    Sunday   breakfast 85  and 10 units lispro  8:30 am 30 units of treciba  Lunch 121 and 10 units of lispro  Supper 102 and 10 units of lispro  10:33 pm 166    Monday  Breakfast 107 and 10 units lispro  8:30 am and 30 units of treciba  Lunch 198 10 units of lispro

## 2024-09-18 DIAGNOSIS — E78.2 MIXED HYPERLIPIDEMIA: ICD-10-CM

## 2024-09-18 RX ORDER — SIMVASTATIN 80 MG
TABLET ORAL
Qty: 90 TABLET | Refills: 1 | Status: SHIPPED | OUTPATIENT
Start: 2024-09-18

## 2024-11-29 PROCEDURE — 88305 TISSUE EXAM BY PATHOLOGIST: CPT | Performed by: PATHOLOGY

## 2024-11-29 PROCEDURE — 88312 SPECIAL STAINS GROUP 1: CPT | Performed by: PATHOLOGY

## 2024-11-29 PROCEDURE — 88313 SPECIAL STAINS GROUP 2: CPT | Performed by: PATHOLOGY

## 2024-12-02 ENCOUNTER — LAB REQUISITION (OUTPATIENT)
Dept: LAB | Facility: HOSPITAL | Age: 89
End: 2024-12-02
Payer: COMMERCIAL

## 2024-12-02 DIAGNOSIS — L30.9 DERMATITIS, UNSPECIFIED: ICD-10-CM

## 2024-12-05 PROCEDURE — 88313 SPECIAL STAINS GROUP 2: CPT | Performed by: PATHOLOGY

## 2024-12-05 PROCEDURE — 88305 TISSUE EXAM BY PATHOLOGIST: CPT | Performed by: PATHOLOGY

## 2024-12-05 PROCEDURE — 88312 SPECIAL STAINS GROUP 1: CPT | Performed by: PATHOLOGY

## 2024-12-16 LAB
LEFT EYE DIABETIC RETINOPATHY: POSITIVE
RIGHT EYE DIABETIC RETINOPATHY: NORMAL

## 2024-12-17 ENCOUNTER — APPOINTMENT (OUTPATIENT)
Dept: LAB | Facility: CLINIC | Age: 89
End: 2024-12-17
Payer: COMMERCIAL

## 2024-12-17 DIAGNOSIS — N18.32 TYPE 2 DIABETES MELLITUS WITH STAGE 3B CHRONIC KIDNEY DISEASE, WITH LONG-TERM CURRENT USE OF INSULIN (HCC): ICD-10-CM

## 2024-12-17 DIAGNOSIS — E55.9 VITAMIN D DEFICIENCY: ICD-10-CM

## 2024-12-17 DIAGNOSIS — I10 PRIMARY HYPERTENSION: ICD-10-CM

## 2024-12-17 DIAGNOSIS — Z79.4 TYPE 2 DIABETES MELLITUS WITH STAGE 3B CHRONIC KIDNEY DISEASE, WITH LONG-TERM CURRENT USE OF INSULIN (HCC): ICD-10-CM

## 2024-12-17 DIAGNOSIS — E78.2 MIXED HYPERLIPIDEMIA: ICD-10-CM

## 2024-12-17 DIAGNOSIS — E11.22 TYPE 2 DIABETES MELLITUS WITH STAGE 3B CHRONIC KIDNEY DISEASE, WITH LONG-TERM CURRENT USE OF INSULIN (HCC): ICD-10-CM

## 2024-12-17 LAB
25(OH)D3 SERPL-MCNC: 85.5 NG/ML (ref 30–100)
ALBUMIN SERPL BCG-MCNC: 4.1 G/DL (ref 3.5–5)
ALP SERPL-CCNC: 58 U/L (ref 34–104)
ALT SERPL W P-5'-P-CCNC: 10 U/L (ref 7–52)
ANION GAP SERPL CALCULATED.3IONS-SCNC: 7 MMOL/L (ref 4–13)
AST SERPL W P-5'-P-CCNC: 18 U/L (ref 13–39)
BILIRUB SERPL-MCNC: 0.62 MG/DL (ref 0.2–1)
BUN SERPL-MCNC: 27 MG/DL (ref 5–25)
CALCIUM SERPL-MCNC: 10 MG/DL (ref 8.4–10.2)
CHLORIDE SERPL-SCNC: 104 MMOL/L (ref 96–108)
CHOLEST SERPL-MCNC: 138 MG/DL (ref ?–200)
CO2 SERPL-SCNC: 31 MMOL/L (ref 21–32)
CREAT SERPL-MCNC: 1.15 MG/DL (ref 0.6–1.3)
CREAT UR-MCNC: 95.8 MG/DL
ERYTHROCYTE [DISTWIDTH] IN BLOOD BY AUTOMATED COUNT: 12.7 % (ref 11.6–15.1)
EST. AVERAGE GLUCOSE BLD GHB EST-MCNC: 160 MG/DL
GFR SERPL CREATININE-BSD FRML MDRD: 42 ML/MIN/1.73SQ M
GLUCOSE P FAST SERPL-MCNC: 91 MG/DL (ref 65–99)
HBA1C MFR BLD: 7.2 %
HCT VFR BLD AUTO: 45.1 % (ref 34.8–46.1)
HDLC SERPL-MCNC: 42 MG/DL
HGB BLD-MCNC: 14.5 G/DL (ref 11.5–15.4)
LDLC SERPL CALC-MCNC: 69 MG/DL (ref 0–100)
MCH RBC QN AUTO: 30.1 PG (ref 26.8–34.3)
MCHC RBC AUTO-ENTMCNC: 32.2 G/DL (ref 31.4–37.4)
MCV RBC AUTO: 94 FL (ref 82–98)
MICROALBUMIN UR-MCNC: 8 MG/L
MICROALBUMIN/CREAT 24H UR: 8 MG/G CREATININE (ref 0–30)
NONHDLC SERPL-MCNC: 96 MG/DL
PLATELET # BLD AUTO: 228 THOUSANDS/UL (ref 149–390)
PMV BLD AUTO: 10.9 FL (ref 8.9–12.7)
POTASSIUM SERPL-SCNC: 4.1 MMOL/L (ref 3.5–5.3)
PROT SERPL-MCNC: 6.9 G/DL (ref 6.4–8.4)
RBC # BLD AUTO: 4.81 MILLION/UL (ref 3.81–5.12)
SODIUM SERPL-SCNC: 142 MMOL/L (ref 135–147)
TRIGL SERPL-MCNC: 136 MG/DL (ref ?–150)
WBC # BLD AUTO: 8.37 THOUSAND/UL (ref 4.31–10.16)

## 2024-12-17 PROCEDURE — 82306 VITAMIN D 25 HYDROXY: CPT

## 2024-12-17 PROCEDURE — 85027 COMPLETE CBC AUTOMATED: CPT

## 2024-12-17 PROCEDURE — 82570 ASSAY OF URINE CREATININE: CPT

## 2024-12-17 PROCEDURE — 83036 HEMOGLOBIN GLYCOSYLATED A1C: CPT

## 2024-12-17 PROCEDURE — 36415 COLL VENOUS BLD VENIPUNCTURE: CPT

## 2024-12-17 PROCEDURE — 82043 UR ALBUMIN QUANTITATIVE: CPT

## 2024-12-17 PROCEDURE — 80053 COMPREHEN METABOLIC PANEL: CPT

## 2024-12-17 PROCEDURE — 80061 LIPID PANEL: CPT

## 2024-12-19 ENCOUNTER — RESULTS FOLLOW-UP (OUTPATIENT)
Dept: FAMILY MEDICINE CLINIC | Facility: CLINIC | Age: 89
End: 2024-12-19

## 2024-12-31 ENCOUNTER — OFFICE VISIT (OUTPATIENT)
Dept: FAMILY MEDICINE CLINIC | Facility: CLINIC | Age: 89
End: 2024-12-31
Payer: COMMERCIAL

## 2024-12-31 VITALS
BODY MASS INDEX: 29.84 KG/M2 | HEIGHT: 60 IN | DIASTOLIC BLOOD PRESSURE: 60 MMHG | OXYGEN SATURATION: 97 % | HEART RATE: 85 BPM | RESPIRATION RATE: 16 BRPM | TEMPERATURE: 97.6 F | SYSTOLIC BLOOD PRESSURE: 138 MMHG | WEIGHT: 152 LBS

## 2024-12-31 DIAGNOSIS — I10 PRIMARY HYPERTENSION: ICD-10-CM

## 2024-12-31 DIAGNOSIS — E11.22 TYPE 2 DIABETES MELLITUS WITH STAGE 3B CHRONIC KIDNEY DISEASE, WITH LONG-TERM CURRENT USE OF INSULIN (HCC): Primary | ICD-10-CM

## 2024-12-31 DIAGNOSIS — Z79.4 TYPE 2 DIABETES MELLITUS WITH STAGE 3B CHRONIC KIDNEY DISEASE, WITH LONG-TERM CURRENT USE OF INSULIN (HCC): Primary | ICD-10-CM

## 2024-12-31 DIAGNOSIS — Z23 NEED FOR COVID-19 VACCINE: ICD-10-CM

## 2024-12-31 DIAGNOSIS — Z79.4 TYPE 2 DIABETES MELLITUS WITH HYPERGLYCEMIA, WITH LONG-TERM CURRENT USE OF INSULIN (HCC): ICD-10-CM

## 2024-12-31 DIAGNOSIS — N18.32 TYPE 2 DIABETES MELLITUS WITH STAGE 3B CHRONIC KIDNEY DISEASE, WITH LONG-TERM CURRENT USE OF INSULIN (HCC): Primary | ICD-10-CM

## 2024-12-31 DIAGNOSIS — E11.65 TYPE 2 DIABETES MELLITUS WITH HYPERGLYCEMIA, WITH LONG-TERM CURRENT USE OF INSULIN (HCC): ICD-10-CM

## 2024-12-31 DIAGNOSIS — Z00.00 MEDICARE ANNUAL WELLNESS VISIT, SUBSEQUENT: ICD-10-CM

## 2024-12-31 DIAGNOSIS — E78.2 MIXED HYPERLIPIDEMIA: ICD-10-CM

## 2024-12-31 PROCEDURE — 99214 OFFICE O/P EST MOD 30 MIN: CPT | Performed by: FAMILY MEDICINE

## 2024-12-31 PROCEDURE — 90480 ADMN SARSCOV2 VAC 1/ONLY CMP: CPT

## 2024-12-31 PROCEDURE — 91320 SARSCV2 VAC 30MCG TRS-SUC IM: CPT

## 2024-12-31 PROCEDURE — G0439 PPPS, SUBSEQ VISIT: HCPCS | Performed by: FAMILY MEDICINE

## 2024-12-31 RX ORDER — BLOOD SUGAR DIAGNOSTIC
STRIP MISCELLANEOUS
Qty: 300 STRIP | Refills: 3 | Status: SHIPPED | OUTPATIENT
Start: 2024-12-31

## 2024-12-31 RX ORDER — BETAMETHASONE DIPROPIONATE 0.5 MG/G
1 CREAM TOPICAL 2 TIMES DAILY
COMMUNITY
Start: 2024-12-06

## 2024-12-31 NOTE — ASSESSMENT & PLAN NOTE
Controlled. DASH diet. Continue losartan 100mg QD.   Orders:    Comprehensive metabolic panel; Future    Hemoglobin A1C; Future    Lipid panel; Future

## 2024-12-31 NOTE — ASSESSMENT & PLAN NOTE
Low fat diet. Continue simvastatin 80mg qhs.   Orders:    Comprehensive metabolic panel; Future    Hemoglobin A1C; Future    Lipid panel; Future     Biopsy Type: H and E

## 2024-12-31 NOTE — PROGRESS NOTES
Name: Lachelle Salazar      : 1935      MRN: [id number]  Encounter Provider: Nimesh Napier MD  Encounter Date: 2024   Encounter department: [facility]    Assessment & Plan  Type 2 diabetes mellitus with stage 3b chronic kidney disease, with long-term current use of insulin (HCC)    Lab Results   Component Value Date    HGBA1C 7.2 (H) 2024     Low carb diet. Continue tresiba 30u QD and humalog 10u with meals.   Orders:    Comprehensive metabolic panel; Future    Hemoglobin A1C; Future    Lipid panel; Future    Type 2 diabetes mellitus with hyperglycemia, with long-term current use of insulin (HCC)    Lab Results   Component Value Date    HGBA1C 7.2 (H) 2024       Orders:    glucose blood (OneTouch Verio) test strip; Use as instructed    Primary hypertension  Controlled. DASH diet. Continue losartan 100mg QD.   Orders:    Comprehensive metabolic panel; Future    Hemoglobin A1C; Future    Lipid panel; Future    Mixed hyperlipidemia  Low fat diet. Continue simvastatin 80mg qhs.   Orders:    Comprehensive metabolic panel; Future    Hemoglobin A1C; Future    Lipid panel; Future    Need for COVID-19 vaccine    Orders:    COVID-19 Pfizer mRNA vaccine 12 yr and older (Comirnaty pre-filled syringe)    Medicare annual wellness visit, subsequent           Depression Screening and Follow-up Plan: Patient was screened for depression during today's encounter. They screened negative with a PHQ-2 score of 0.      Reviewed lab in 2024  hga1C 7.2 good  CBC normal  CMP ok GFR 42 stable  Lipid 138/136/42/69 good  A/c 8 good  Vit D 85.5    Got flu shot yearly. Got it this season.   Got covid19 vaccines and boosters. Give booster today.   Got prevnar 2015. Got pneumovax .   Got shingrix .  Refused Dexa. Fall precautions.   RTO in 6 months with labs.     POA---daughter  Living will---DNR per pt.         Preventive health issues were discussed with patient, and age appropriate  screening tests were ordered as noted in patient's After Visit Summary. Personalized health advice and appropriate referrals for health education or preventive services given if needed, as noted in patient's After Visit Summary.    History of Present Illness     HPI     Pt is here by herself.      DM---12/2024 hgA1C 7.2 controlled.   She is on tresiba 30u QD and lispro 10u with meals.    Denies hypoglycemia recently.   Denies neuropathy.   FU opthalmology Dr. Sarmiento and Dr. North. Right eye cannot see. FU retinal specialist also per pt.   FU podiatry Dr. Canela Q 3-4 months.     HTN----She is on losartan 100mg QD. Denies SOB, CP, headache etc. She checks BP at home occasionally.     Hyperlipidemia----she is on simvastatin 80mg qhs. Denies side effects.      Vit D deficiency---She is on vitD supplement 5000IU daily.      She goes to "[employer] exercise 4 times/week.    Pt lives by herself. Does all ADL's. Still driving and cooking.   Had children living close to her.  Denies recent falls.   Denies depression.       Patient Care Team:  Nimesh Napier MD as PCP - General  Ernesto Canela DPM (Podiatry)  Bertram Sidhu MD (Ophthalmology)  Stewart Le MD (Ophthalmology)    Review of Systems   Constitutional:  Negative for appetite change, chills and fever.   HENT:  Negative for congestion, ear pain, sinus pain and sore throat.    Eyes:  Negative for discharge and itching.   Respiratory:  Negative for apnea, cough, chest tightness, shortness of breath and wheezing.    Cardiovascular:  Negative for chest pain, palpitations and leg swelling.   Gastrointestinal:  Negative for abdominal pain, anal bleeding, constipation, diarrhea, nausea and vomiting.   Endocrine: Negative for cold intolerance, heat intolerance and polyuria.   Genitourinary:  Negative for difficulty urinating and dysuria.   Musculoskeletal:  Negative for arthralgias, back pain and myalgias.   Skin:  Negative for rash.   Neurological:  Negative for  dizziness and headaches.   Psychiatric/Behavioral:  Negative for agitation.      Medical History Reviewed by provider this encounter:  Problems  Med Hx  Surg Hx       Annual Wellness Visit Questionnaire   Lachelle is here for her Subsequent Wellness visit. Last Medicare Wellness visit information reviewed, patient interviewed and updates made to the record today.      Health Risk Assessment:   Patient rates overall health as very good. Patient feels that their physical health rating is same. Patient is satisfied with their life. Eyesight was rated as same. Hearing was rated as same. Patient feels that their emotional and mental health rating is same. Patients states they are never, rarely angry. Patient states they are never, rarely unusually tired/fatigued. Pain experienced in the last 7 days has been some. Patient's pain rating has been 3/10. Patient states that she has experienced no weight loss or gain in last 6 months.     Depression Screening:   PHQ-2 Score: 0      Fall Risk Screening:   In the past year, patient has experienced: no history of falling in past year      Urinary Incontinence Screening:   Patient has not leaked urine accidently in the last six months.     Home Safety:  Patient does not have trouble with stairs inside or outside of their home. Patient has working smoke alarms and has working carbon monoxide detector. Home safety hazards include: none.     Nutrition:   Current diet is Regular.     Medications:   Patient is currently taking over-the-counter supplements. OTC medications include: see medication list. Patient is able to manage medications.     Activities of Daily Living (ADLs)/Instrumental Activities of Daily Living (IADLs):   Walk and transfer into and out of bed and chair?: Yes  Dress and groom yourself?: Yes    Bathe or shower yourself?: Yes    Feed yourself? Yes  Do your laundry/housekeeping?: Yes  Manage your money, pay your bills and track your expenses?: Yes  Make your own  meals?: Yes    Do your own shopping?: Yes    Previous Hospitalizations:   Any hospitalizations or ED visits within the last 12 months?: No      Advance Care Planning:   Living will: Yes    Durable POA for healthcare: Yes    Advanced directive: Yes    End of Life Decisions reviewed with patient: Yes    Provider agrees with end of life decisions: Yes      Cognitive Screening:   Provider or family/friend/caregiver concerned regarding cognition?: No    PREVENTIVE SCREENINGS      Cardiovascular Screening:    General: History Lipid Disorder and Screening Current      Diabetes Screening:     General: History Diabetes and Screening Current      Colorectal Cancer Screening:     General: Screening Not Indicated      Breast Cancer Screening:     General: Screening Not Indicated      Cervical Cancer Screening:    General: Screening Not Indicated      Osteoporosis Screening:    General: Patient Declines      Lung Cancer Screening:     General: Screening Not Indicated    Screening, Brief Intervention, and Referral to Treatment (SBIRT)    Screening    Typical number of drinks in a week: 0    Single Item Drug Screening:  How often have you used an illegal drug (including marijuana) or a prescription medication for non-medical reasons in the past year? never    Single Item Drug Screen Score: 0  Interpretation: Negative screen for possible drug use disorder    Social Drivers of Health     Financial Resource Strain: Low Risk  (12/14/2023)    Overall Financial Resource Strain (CARDIA)     Difficulty of Paying Living Expenses: Not hard at all   Food Insecurity: No Food Insecurity (12/31/2024)    Hunger Vital Sign     Worried About Running Out of Food in the Last Year: Never true     Ran Out of Food in the Last Year: Never true   Transportation Needs: No Transportation Needs (12/31/2024)    PRAPARE - Transportation     Lack of Transportation (Medical): No     Lack of Transportation (Non-Medical): No   Housing Stability: Low Risk   (12/31/2024)    Housing Stability Vital Sign     Unable to Pay for Housing in the Last Year: No     Number of Times Moved in the Last Year: 0     Homeless in the Last Year: No   Utilities: Not At Risk (12/31/2024)    [facility] Utilities     Threatened with loss of utilities: No     No results found.    Objective   /60 (BP Location: Left arm, Patient Position: Sitting, Cuff Size: Standard)   Pulse 85   Temp 97.6 °F (36.4 °C) (Tympanic)   Resp 16   Ht 5' (1.524 m)   Wt 68.9 kg (152 lb)   SpO2 97%   BMI 29.69 kg/m²     Physical Exam  Constitutional:       General: She is not in acute distress.     Appearance: She is well-developed.   HENT:      Head: Normocephalic.   Eyes:      General:         Right eye: No discharge.         Left eye: No discharge.      Conjunctiva/sclera: Conjunctivae normal.   Neck:      Thyroid: No thyromegaly.   Cardiovascular:      Rate and Rhythm: Normal rate and regular rhythm.      Heart sounds: Normal heart sounds. No murmur heard.     No friction rub. No gallop.   Pulmonary:      Effort: Pulmonary effort is normal. No respiratory distress.      Breath sounds: Normal breath sounds. No wheezing or rales.   Chest:      Chest wall: No tenderness.   Abdominal:      General: Bowel sounds are normal. There is no distension.      Palpations: Abdomen is soft. There is no mass.      Tenderness: There is no abdominal tenderness. There is no guarding or rebound.   Musculoskeletal:         General: No tenderness or deformity. Normal range of motion.      Cervical back: Normal range of motion.   Lymphadenopathy:      Cervical: No cervical adenopathy.   Neurological:      Mental Status: She is alert.

## 2024-12-31 NOTE — ASSESSMENT & PLAN NOTE
Lab Results   Component Value Date    HGBA1C 7.2 (H) 12/17/2024     Low carb diet. Continue tresiba 30u QD and humalog 10u with meals.   Orders:    Comprehensive metabolic panel; Future    Hemoglobin A1C; Future    Lipid panel; Future

## 2025-01-21 DIAGNOSIS — E11.65 TYPE 2 DIABETES MELLITUS WITH HYPERGLYCEMIA, WITH LONG-TERM CURRENT USE OF INSULIN (HCC): ICD-10-CM

## 2025-01-21 DIAGNOSIS — Z79.4 TYPE 2 DIABETES MELLITUS WITH HYPERGLYCEMIA, WITH LONG-TERM CURRENT USE OF INSULIN (HCC): ICD-10-CM

## 2025-01-21 RX ORDER — BLOOD SUGAR DIAGNOSTIC
STRIP MISCELLANEOUS
Qty: 300 STRIP | Refills: 3 | Status: SHIPPED | OUTPATIENT
Start: 2025-01-21

## 2025-01-31 DIAGNOSIS — Z79.4 TYPE 2 DIABETES MELLITUS WITH STAGE 3B CHRONIC KIDNEY DISEASE, WITH LONG-TERM CURRENT USE OF INSULIN (HCC): ICD-10-CM

## 2025-01-31 DIAGNOSIS — E11.22 TYPE 2 DIABETES MELLITUS WITH STAGE 3B CHRONIC KIDNEY DISEASE, WITH LONG-TERM CURRENT USE OF INSULIN (HCC): ICD-10-CM

## 2025-01-31 DIAGNOSIS — N18.32 TYPE 2 DIABETES MELLITUS WITH STAGE 3B CHRONIC KIDNEY DISEASE, WITH LONG-TERM CURRENT USE OF INSULIN (HCC): ICD-10-CM

## 2025-01-31 RX ORDER — INSULIN DEGLUDEC 100 U/ML
30 INJECTION, SOLUTION SUBCUTANEOUS DAILY
Qty: 27 ML | Refills: 1 | Status: SHIPPED | OUTPATIENT
Start: 2025-01-31

## 2025-01-31 NOTE — TELEPHONE ENCOUNTER
Reason for call:   [x] Refill   [] Prior Auth  [] Other:     Office:   [x] PCP/Provider -   [] Specialty/Provider -     Medication:     insulin degludec (Tresiba FlexTouch) 100 units/mL injection pen  Inject 30 Units under the skin daily          Pharmacy: [facility] [address]     Does the patient have enough for 3 days?   [x] Yes   [] No - Send as HP to POD

## 2025-02-20 ENCOUNTER — NURSE TRIAGE (OUTPATIENT)
Age: 89
End: 2025-02-20

## 2025-02-20 NOTE — TELEPHONE ENCOUNTER
"    Transfer call from PEP to CTS regarding foot pain/leg swelling. Triage to follow.    Pt states that she has been having periodic stabbing pain in her feet. Pt triggered by touch of sock to toes but not all the time. Pain is very short lived and un rated.  Pt states her calves have gotten tighter lately. Pt denies pain in calves or warm to the touch. Pt has HX of Type 2 DM.     Advised pt to visit provider. Pt agrees. Appointment scheduled for tomorrow.                Reason for Disposition   Tingling in feet and new or increased    Answer Assessment - Initial Assessment Questions  1. ONSET: \"When did the pain start?\"       Last few days    2. LOCATION: \"Where is the pain located?\"       Bilateral feet    3. PAIN: \"How bad is the pain?\"    (Scale 1-10; or mild, moderate, severe)      Didn't rate but said short stabbing pain    4. WORK OR EXERCISE: \"Has there been any recent work or exercise that involved this part of the body?\"       Denies    5. CAUSE: \"What do you think is causing the foot pain?\"        6. OTHER SYMPTOMS: \"Do you have any other symptoms?\" (e.g., leg pain, rash, fever, numbness)      Occasional tingling and swelling in lower leg    Protocols used: Foot Pain-Adult-[place]    "

## 2025-02-21 ENCOUNTER — HOSPITAL ENCOUNTER (OUTPATIENT)
Dept: NON INVASIVE DIAGNOSTICS | Facility: HOSPITAL | Age: 89
Discharge: HOME | End: 2025-02-21
Attending: FAMILY MEDICINE
Payer: COMMERCIAL

## 2025-02-21 ENCOUNTER — OFFICE VISIT (OUTPATIENT)
Dept: FAMILY MEDICINE CLINIC | Facility: CLINIC | Age: 89
End: 2025-02-21
Payer: COMMERCIAL

## 2025-02-21 ENCOUNTER — RESULTS FOLLOW-UP (OUTPATIENT)
Dept: FAMILY MEDICINE CLINIC | Facility: CLINIC | Age: 89
End: 2025-02-21

## 2025-02-21 VITALS
OXYGEN SATURATION: 98 % | WEIGHT: 156 LBS | DIASTOLIC BLOOD PRESSURE: 75 MMHG | SYSTOLIC BLOOD PRESSURE: 140 MMHG | HEART RATE: 92 BPM | BODY MASS INDEX: 30.63 KG/M2 | HEIGHT: 60 IN | TEMPERATURE: 97.3 F | RESPIRATION RATE: 16 BRPM

## 2025-02-21 DIAGNOSIS — E11.22 TYPE 2 DIABETES MELLITUS WITH STAGE 3B CHRONIC KIDNEY DISEASE, WITH LONG-TERM CURRENT USE OF INSULIN (HCC): ICD-10-CM

## 2025-02-21 DIAGNOSIS — M79.604 RIGHT LEG PAIN: ICD-10-CM

## 2025-02-21 DIAGNOSIS — M79.675 GREAT TOE PAIN, LEFT: Primary | ICD-10-CM

## 2025-02-21 DIAGNOSIS — I73.9 PAD (PERIPHERAL ARTERY DISEASE) (HCC): Primary | ICD-10-CM

## 2025-02-21 DIAGNOSIS — Z79.4 TYPE 2 DIABETES MELLITUS WITH STAGE 3B CHRONIC KIDNEY DISEASE, WITH LONG-TERM CURRENT USE OF INSULIN (HCC): ICD-10-CM

## 2025-02-21 DIAGNOSIS — N18.32 TYPE 2 DIABETES MELLITUS WITH STAGE 3B CHRONIC KIDNEY DISEASE, WITH LONG-TERM CURRENT USE OF INSULIN (HCC): ICD-10-CM

## 2025-02-21 PROCEDURE — 99214 OFFICE O/P EST MOD 30 MIN: CPT | Performed by: FAMILY MEDICINE

## 2025-02-21 PROCEDURE — 93971 EXTREMITY STUDY: CPT | Performed by: SURGERY

## 2025-02-21 PROCEDURE — G2211 COMPLEX E/M VISIT ADD ON: HCPCS | Performed by: FAMILY MEDICINE

## 2025-02-21 PROCEDURE — 93971 EXTREMITY STUDY: CPT

## 2025-02-21 RX ORDER — CEPHALEXIN 500 MG/1
500 CAPSULE ORAL EVERY 8 HOURS SCHEDULED
Qty: 21 CAPSULE | Refills: 0 | Status: SHIPPED | OUTPATIENT
Start: 2025-02-21 | End: 2025-02-28

## 2025-02-21 NOTE — ASSESSMENT & PLAN NOTE
Lab Results   Component Value Date    HGBA1C 7.2 (H) 12/17/2024     Controlled. Continue tresiba 30u qhs and humalog 10u tid with meals.

## 2025-02-21 NOTE — PROGRESS NOTES
Name: Lachelle Salazar      : 1935      MRN: [id number]  Encounter Provider: Nimesh Napier MD  Encounter Date: 2025   Encounter department: [facility]  :  Assessment & Plan  Great toe pain, left  DD including cellulitis, gout, arthritis etc.   Orders:    XR foot 3+ vw left; Future    Uric acid; Future    cephalexin (KEFLEX) 500 mg capsule; Take 1 capsule (500 mg total) by mouth every 8 (eight) hours for 7 days    Right leg pain  Order stat duplex to r/o DVT.     Orders:    VAS VENOUS DUPLEX -LOWER LIMB UNILATERAL; Future    Type 2 diabetes mellitus with stage 3b chronic kidney disease, with long-term current use of insulin ([facility])    Lab Results   Component Value Date    HGBA1C 7.2 (H) 2024     Controlled. Continue tresiba 30u qhs and humalog 10u tid with meals.               History of Present Illness   HPI    Pt is here with her daughter.     Left foot pain for several weeks.   Big toe pain, come and go.   Burning sensation.   Bottom foot dry.   Saw Podiatry, got ingrown toenail surgery on 2025.     Right leg swollen for 4 days.  Little pain.   Denies fever.   Denies SOB, CP, n/v/abd pain.         Review of Systems   Constitutional:  Negative for appetite change, chills and fever.   HENT:  Negative for congestion, ear pain, sinus pain and sore throat.    Eyes:  Negative for discharge and itching.   Respiratory:  Negative for apnea, cough, chest tightness, shortness of breath and wheezing.    Cardiovascular:  Negative for chest pain, palpitations and leg swelling.   Gastrointestinal:  Negative for abdominal pain, anal bleeding, constipation, diarrhea, nausea and vomiting.   Endocrine: Negative for cold intolerance, heat intolerance and polyuria.   Genitourinary:  Negative for difficulty urinating and dysuria.   Musculoskeletal:  Positive for arthralgias. Negative for back pain and myalgias.   Skin:  Negative for rash.   Neurological:  Negative for dizziness and headaches.    Psychiatric/Behavioral:  Negative for agitation.        Objective   Pulse 92   Temp (!) 97.3 °F (36.3 °C) (Tympanic)   Resp 16   Ht 5' (1.524 m)   Wt 70.8 kg (156 lb)   SpO2 98%   BMI 30.47 kg/m²      Physical Exam  Constitutional:       General: She is not in acute distress.     Appearance: She is well-developed.   HENT:      Head: Normocephalic.   Eyes:      General:         Right eye: No discharge.         Left eye: No discharge.      Conjunctiva/sclera: Conjunctivae normal.   Neck:      Thyroid: No thyromegaly.   Cardiovascular:      Rate and Rhythm: Normal rate and regular rhythm.      Heart sounds: Normal heart sounds. No murmur heard.     No friction rub. No gallop.   Pulmonary:      Effort: Pulmonary effort is normal. No respiratory distress.      Breath sounds: Normal breath sounds. No wheezing or rales.   Chest:      Chest wall: No tenderness.   Abdominal:      General: Bowel sounds are normal. There is no distension.      Palpations: Abdomen is soft. There is no mass.      Tenderness: There is no abdominal tenderness. There is no guarding or rebound.   Musculoskeletal:         General: Tenderness present. No deformity. Normal range of motion.      Cervical back: Normal range of motion.      Comments: Left big toe swollen and erythema  Right lower leg swollen   Lymphadenopathy:      Cervical: No cervical adenopathy.   Neurological:      Mental Status: She is alert.

## 2025-02-21 NOTE — TELEPHONE ENCOUNTER
Spoke to patient's daughter made her aware of results and explained what it meant. Also made her aware of referral for vascular surgery, provided her with number to call and schedule appointment

## 2025-02-21 NOTE — TELEPHONE ENCOUNTER
Patients daughter called, they would like for someone to please reach out to them because they are unclear on next steps in care for patient.       Please advise patient once this request is completed.

## 2025-02-22 ENCOUNTER — APPOINTMENT (OUTPATIENT)
Dept: LAB | Facility: CLINIC | Age: 89
End: 2025-02-22
Payer: COMMERCIAL

## 2025-02-22 ENCOUNTER — HOSPITAL ENCOUNTER (OUTPATIENT)
Dept: RADIOLOGY | Facility: HOSPITAL | Age: 89
Discharge: HOME | End: 2025-02-22
Attending: FAMILY MEDICINE
Payer: COMMERCIAL

## 2025-02-22 DIAGNOSIS — M79.675 GREAT TOE PAIN, LEFT: ICD-10-CM

## 2025-02-22 LAB — URATE SERPL-MCNC: 4.5 MG/DL (ref 2–7.5)

## 2025-02-22 PROCEDURE — 84550 ASSAY OF BLOOD/URIC ACID: CPT

## 2025-02-22 PROCEDURE — 36415 COLL VENOUS BLD VENIPUNCTURE: CPT

## 2025-02-22 PROCEDURE — 73630 X-RAY EXAM OF FOOT: CPT

## 2025-02-25 NOTE — TELEPHONE ENCOUNTER
Spoke to patient's daughter, says its still the same, toe still swollen and painful, has appointment with vascular next week

## 2025-02-26 DIAGNOSIS — M79.89 LEG SWELLING: Primary | ICD-10-CM

## 2025-02-26 RX ORDER — FUROSEMIDE 20 MG/1
20 TABLET ORAL DAILY
Qty: 7 TABLET | Refills: 0 | Status: SHIPPED | OUTPATIENT
Start: 2025-02-26

## 2025-03-04 ENCOUNTER — OFFICE VISIT (OUTPATIENT)
Dept: VASCULAR SURGERY | Facility: CLINIC | Age: 89
End: 2025-03-04
Payer: COMMERCIAL

## 2025-03-04 VITALS
HEIGHT: 60 IN | BODY MASS INDEX: 30.23 KG/M2 | OXYGEN SATURATION: 100 % | WEIGHT: 154 LBS | SYSTOLIC BLOOD PRESSURE: 136 MMHG | HEART RATE: 93 BPM | DIASTOLIC BLOOD PRESSURE: 72 MMHG

## 2025-03-04 DIAGNOSIS — I10 PRIMARY HYPERTENSION: ICD-10-CM

## 2025-03-04 DIAGNOSIS — E11.22 TYPE 2 DIABETES MELLITUS WITH STAGE 3B CHRONIC KIDNEY DISEASE, WITH LONG-TERM CURRENT USE OF INSULIN (HCC): ICD-10-CM

## 2025-03-04 DIAGNOSIS — Z79.4 TYPE 2 DIABETES MELLITUS WITH STAGE 3B CHRONIC KIDNEY DISEASE, WITH LONG-TERM CURRENT USE OF INSULIN (HCC): ICD-10-CM

## 2025-03-04 DIAGNOSIS — N18.32 TYPE 2 DIABETES MELLITUS WITH STAGE 3B CHRONIC KIDNEY DISEASE, WITH LONG-TERM CURRENT USE OF INSULIN (HCC): ICD-10-CM

## 2025-03-04 DIAGNOSIS — I73.9 PAD (PERIPHERAL ARTERY DISEASE) (HCC): Primary | ICD-10-CM

## 2025-03-04 PROCEDURE — 99204 OFFICE O/P NEW MOD 45 MIN: CPT

## 2025-03-04 NOTE — PROGRESS NOTES
Name: Lachelle Salazar      : 1935      MRN: [id number]  Encounter Provider: SHAI Dillard  Encounter Date: 3/4/2025   Encounter department: [facility] [place]  :  Assessment & Plan  PAD (peripheral artery disease) (HCC)  Patient is reporting swelling to her bilateral lower extremities extending from foot to mid calf.  Swelling has been ongoing for approximately 2 months.  Patient reports significant tenderness to her left great toe, as well as redness and slowly healing skin biopsy site.  Patient was recently seen by her PCP and prescribed a course of Keflex for perceived cellulitis to the left great toe.  Patient reports that pain occurs with leg elevation and ambulation.  She also reports intermittent bilateral lower extremity cramping that extends from the feet to the thigh.  Patient is denying any sensation of heaviness/fatigue, bulging varicose veins, or numbness/tingling to the bilateral feet.    Imaging:  Venous duplex 2025:  No evidence of RLE DVT/SVT. Incidental finding of >75% stenosis of the proximal right popliteal artery.    Plan:  -We discussed the pathophysiology of peripheral arterial disease as well as contributing lifestyle factors.  -We discussed the results of venous duplex from 2025 which incidentally noted a > percent stenosis in the right popliteal artery.  -Symptoms of left toe rest pain and discoloration are concerning for significant PAD.  -We will obtain LEAD and AOIL at patient's earliest convenience.  -Continue medical optimization with simvastatin and aspirin  -Instructed patient to notify office of any worsening claudication, rest pain, or tissue loss  -Instructed patient to report to the ED for any symptoms of acute limb ischemia (color/temperature change, motor/sensory loss, tissue loss).  -Follow up in the office after LEAD and AOIL.      Orders:    Ambulatory Referral to Vascular Surgery    VAS ARTERIAL DUPLEX- LOWER LIMB BILATERAL;  Future    VAS abdominal aorta/iliac duplex; Future    Primary hypertension  Blood pressure is well controlled in the office today, 136/72.    -Continue medical management per PCP  -Low salt diet         Type 2 diabetes mellitus with stage 3b chronic kidney disease, with long-term current use of insulin ([facility])    Lab Results   Component Value Date    HGBA1C 7.2 (H) 12/17/2024   Poorly controlled, insulin dependant. Continue management per PCP.             History of Present Illness   Lachelle Salazar is a 89 y.o. female, nonsmoker, with PMH HTN, HLD, type II DM, osteoarthritis, and lumbar foraminal stenosis. Patient is presenting to the vascular surgery office upon referral by her PCP to discuss results of venous duplex completed 2/21/2025.    Patient is reporting swelling to her bilateral lower extremities extending from foot to mid calf.  Swelling has been ongoing for approximately 2 months.  Patient reports significant tenderness to her left great toe, as well as redness and slowly healing skin biopsy site.  Patient was recently seen by her PCP and prescribed a course of Keflex for perceived cellulitis to the left great toe.  Patient reports that pain occurs with leg elevation and ambulation.  She also reports intermittent bilateral lower extremity cramping that extends from the feet to the thigh.  Patient is denying any sensation of heaviness/fatigue, bulging varicose veins, or numbness/tingling to the bilateral feet.    History obtained from: patient and patient's caregiver    Review of Systems   Constitutional:  Positive for activity change. Negative for chills and fever.   HENT: Negative.     Eyes: Negative.    Respiratory: Negative.     Cardiovascular:  Positive for leg swelling.   Gastrointestinal: Negative.    Endocrine: Negative.    Genitourinary: Negative.    Musculoskeletal: Negative.    Skin:  Positive for color change.   Allergic/Immunologic: Negative.    Neurological: Negative.    Hematological:  Negative.    Psychiatric/Behavioral: Negative.       Pertinent Medical History   Past Medical History:   Diagnosis Date    Carpal tunnel syndrome     Choledocholithiasis     resolved 3/6/2015    Diverticulosis     Inguinal hernia     Sciatica           Medical History Reviewed by provider this encounter:     .  Current Outpatient Medications on File Prior to Visit   Medication Sig Dispense Refill    aspirin 81 mg chewable tablet Chew 81 mg daily      betamethasone dipropionate (DIPROSONE) 0.05 % cream Apply 1 Application topically 2 (two) times a day To affected area      brimonidine (ALPHAGAN P) 0.1 % Apply to eye      Calcium Carbonate-Vit D-Min (CALCIUM 1200) 2221-4632 MG-UNIT CHEW Chew      Cholecalciferol (VITAMIN D3) 5000 UNIT/ML LIQD Take by mouth      furosemide (LASIX) 20 mg tablet Take 1 tablet (20 mg total) by mouth daily 7 tablet 0    insulin degludec (Tresiba FlexTouch) 100 units/mL injection pen Inject 30 Units under the skin daily 27 mL 1    latanoprost (XALATAN) 0.005 % ophthalmic solution Apply to eye      losartan (COZAAR) 100 MG tablet TAKE 1 TABLET BY MOUTH EVERY DAY 90 tablet 3    simvastatin (ZOCOR) 80 mg tablet TAKE 1 TABLET BY MOUTH EVERY DAY 90 tablet 1    glucose blood (OneTouch Verio) test strip Check blood sugar 3 times per day. 300 strip 3    insulin lispro (HumaLOG) 100 units/mL injection pen Inject 10 Units under the skin 3 (three) times a day with meals And ISS 30 mL 1    Insulin Pen Needle 32G X 4 MM MISC Use 4 (four) times a day 400 each 3    OneTouch Delica Lancets 33G MISC by Other route 3 (three) times a day Check blood sugar 4 times per day 300 each 3     No current facility-administered medications on file prior to visit.      Social History     Tobacco Use    Smoking status: Never     Passive exposure: Never    Smokeless tobacco: Never   Vaping Use    Vaping status: Never Used   Substance and Sexual Activity    Alcohol use: No    Drug use: No    Sexual activity: Not on file         Objective   /72   Pulse 93   Ht 5' (1.524 m)   Wt 69.9 kg (154 lb)   SpO2 100%   BMI 30.08 kg/m²      Physical Exam  Vitals and nursing note reviewed.   Constitutional:       General: She is not in acute distress.     Appearance: Normal appearance. She is well-developed.   HENT:      Head: Normocephalic and atraumatic.   Eyes:      Conjunctiva/sclera: Conjunctivae normal.   Cardiovascular:      Rate and Rhythm: Normal rate and regular rhythm.      Pulses:           Femoral pulses are 0 on the right side and 0 on the left side.       Dorsalis pedis pulses are detected w/ Doppler on the right side and detected w/ Doppler on the left side.        Posterior tibial pulses are detected w/ Doppler on the right side and detected w/ Doppler on the left side.      Heart sounds: Normal heart sounds. No murmur heard.  Pulmonary:      Effort: Pulmonary effort is normal. No respiratory distress.      Breath sounds: Normal breath sounds.   Musculoskeletal:         General: Tenderness (left great toe) present. No swelling.      Cervical back: Neck supple.      Right lower le+ Edema present.      Left lower le+ Edema present.        Feet:    Skin:     General: Skin is warm and dry.      Capillary Refill: Capillary refill takes less than 2 seconds.   Neurological:      General: No focal deficit present.      Mental Status: She is alert and oriented to person, place, and time.   Psychiatric:         Mood and Affect: Mood normal.         Behavior: Behavior normal.         Administrative Statements   I have spent a total time of 45 minutes in caring for this patient on the day of the visit/encounter including Diagnostic results, Prognosis, Risks and benefits of tx options, Instructions for management, Patient and family education, Importance of tx compliance, Risk factor reductions, Impressions, Counseling / Coordination of care, Documenting in the medical record, Reviewing/placing orders in the medical record  (including tests, medications, and/or procedures), and Obtaining or reviewing history  .

## 2025-03-05 NOTE — ASSESSMENT & PLAN NOTE
Lab Results   Component Value Date    HGBA1C 7.2 (H) 12/17/2024   Poorly controlled, insulin dependant. Continue management per PCP.

## 2025-03-05 NOTE — ASSESSMENT & PLAN NOTE
Patient is reporting swelling to her bilateral lower extremities extending from foot to mid calf.  Swelling has been ongoing for approximately 2 months.  Patient reports significant tenderness to her left great toe, as well as redness and slowly healing skin biopsy site.  Patient was recently seen by her PCP and prescribed a course of Keflex for perceived cellulitis to the left great toe.  Patient reports that pain occurs with leg elevation and ambulation.  She also reports intermittent bilateral lower extremity cramping that extends from the feet to the thigh.  Patient is denying any sensation of heaviness/fatigue, bulging varicose veins, or numbness/tingling to the bilateral feet.    Imaging:  Venous duplex 2/21/2025:  No evidence of RLE DVT/SVT. Incidental finding of >75% stenosis of the proximal right popliteal artery.    Plan:  -We discussed the pathophysiology of peripheral arterial disease as well as contributing lifestyle factors.  -We discussed the results of venous duplex from 2/21/2025 which incidentally noted a > percent stenosis in the right popliteal artery.  -Symptoms of left toe rest pain and discoloration are concerning for significant PAD.  -We will obtain LEAD and AOIL at patient's earliest convenience.  -Continue medical optimization with simvastatin and aspirin  -Instructed patient to notify office of any worsening claudication, rest pain, or tissue loss  -Instructed patient to report to the ED for any symptoms of acute limb ischemia (color/temperature change, motor/sensory loss, tissue loss).  -Follow up in the office after LEAD and AOIL.      Orders:    Ambulatory Referral to Vascular Surgery    VAS ARTERIAL DUPLEX- LOWER LIMB BILATERAL; Future    VAS abdominal aorta/iliac duplex; Future

## 2025-03-05 NOTE — ASSESSMENT & PLAN NOTE
Blood pressure is well controlled in the office today, 136/72.    -Continue medical management per PCP  -Low salt diet

## 2025-03-13 ENCOUNTER — RESULTS FOLLOW-UP (OUTPATIENT)
Dept: VASCULAR SURGERY | Facility: CLINIC | Age: 89
End: 2025-03-13

## 2025-03-13 ENCOUNTER — HOSPITAL ENCOUNTER (OUTPATIENT)
Dept: NON INVASIVE DIAGNOSTICS | Facility: CLINIC | Age: 89
Discharge: HOME | End: 2025-03-13
Payer: COMMERCIAL

## 2025-03-13 DIAGNOSIS — I73.9 PAD (PERIPHERAL ARTERY DISEASE) (HCC): ICD-10-CM

## 2025-03-13 PROCEDURE — 93978 VASCULAR STUDY: CPT | Performed by: SURGERY

## 2025-03-13 PROCEDURE — 93978 VASCULAR STUDY: CPT

## 2025-03-13 PROCEDURE — 93923 UPR/LXTR ART STDY 3+ LVLS: CPT | Performed by: SURGERY

## 2025-03-13 PROCEDURE — 93925 LOWER EXTREMITY STUDY: CPT | Performed by: SURGERY

## 2025-03-13 PROCEDURE — 93923 UPR/LXTR ART STDY 3+ LVLS: CPT

## 2025-03-13 PROCEDURE — 93925 LOWER EXTREMITY STUDY: CPT

## 2025-03-14 ENCOUNTER — TELEPHONE (OUTPATIENT)
Dept: VASCULAR SURGERY | Facility: CLINIC | Age: 89
End: 2025-03-14

## 2025-03-14 NOTE — TELEPHONE ENCOUNTER
Pt's daughter called back regarding cancellation of f/u appointment for AOIL and LEAD 3/13/25.  Next availability in [place] is in May, she did not want to schedule that far out.  She is asking if provider could review and advise on results now.  She saw results on pt's MyChart and is concerned with them.      Please advise.

## 2025-03-14 NOTE — TELEPHONE ENCOUNTER
Called pt and spoke with her. Expressed to her due to a medical emergency the proivder will not be in the office. She said her daughter zully lcall back and r/s the appointment. Ptovided her with the call back number. Please r/s to next available appointment.

## 2025-03-14 NOTE — TELEPHONE ENCOUNTER
Caller: Samra -daughter    Doctor: Thuy Clemons    Reason for call: :Samra states patient had testing yesterday and is concerned about the results. Patients appointment was cancelled and can't be rescheduled until may. Samra would like to speak to a nurse about testing. I was able to warm transfer her to [facility].    Call back#: [phone]

## 2025-03-14 NOTE — TELEPHONE ENCOUNTER
Called ans spoke with patient's daughter, Samra, informed her that results will be discussed at upcoming OV on 3/24/25.

## 2025-03-24 ENCOUNTER — OFFICE VISIT (OUTPATIENT)
Dept: VASCULAR SURGERY | Facility: CLINIC | Age: 89
End: 2025-03-24
Payer: COMMERCIAL

## 2025-03-24 VITALS
BODY MASS INDEX: 30.51 KG/M2 | WEIGHT: 155.4 LBS | HEIGHT: 60 IN | HEART RATE: 86 BPM | SYSTOLIC BLOOD PRESSURE: 130 MMHG | OXYGEN SATURATION: 98 % | DIASTOLIC BLOOD PRESSURE: 62 MMHG

## 2025-03-24 DIAGNOSIS — I73.9 PAD (PERIPHERAL ARTERY DISEASE) (HCC): ICD-10-CM

## 2025-03-24 PROCEDURE — 99213 OFFICE O/P EST LOW 20 MIN: CPT | Performed by: NURSE PRACTITIONER

## 2025-03-24 NOTE — PATIENT INSTRUCTIONS
Will further evaluate your circulation with CT Angio to plan revascularization. Drink plenty of water before and after CT scan to help flush out your kidneys. You will see the vascular surgeon in follow up after your scan to discuss next steps

## 2025-03-24 NOTE — ASSESSMENT & PLAN NOTE
89-year-old female with HTN, HLD, DM, spinal stenosis, arthritis, BLE swelling, R>L, aortoiliac and arterial occlusive disease, s/p tissue biopsy of the left great toe in December w/ delayed healing     Patient returns to the office to review AOIL/LEAD 3/13/2025    -AOIL showed left distal TORRIE greater than 75% stenosis and right distal EIA 50 to 75% stenosis, celiac and SMA greater than 70% stenosis  -LEAD showed right CFA/proximal SFA/proximal popliteal 50 to 75% stenosis, right DAIJA is noncompressible/-/- and left EIA/CFA showed monophasic arterial waveforms, left DAIJA noncompressible/-/-  -continues with nonhealing left great toe biopsy site, ischemic ulcer and left lower extremity ischemic rest pain  -Recommended further evaluation with CT angiography with runoff to further plan revascularization options.  Last BMP 12/17/2024 creatinine 1.15/GFR 42.  Will repeat now prior to CTA.  -Recommended oral hydration pre and post angiography  -Continue aspirin 81 mg and simvastatin 80mg  -Follow-up after imaging with vascular surgeon to review study and discuss possible treatment options    Orders:    Ambulatory Referral to Vascular Surgery    CTA abdominal w run off w wo contrast; Future    Basic metabolic panel; Future

## 2025-03-24 NOTE — PROGRESS NOTES
Name: Lahcelle Salazar      : 1935      MRN: [id number]  Encounter Provider: SHAI James  Encounter Date: 3/24/2025   Encounter department: [facility] [place]  :  Assessment & Plan  PAD (peripheral artery disease) (HCC)  89-year-old female with HTN, HLD, DM, spinal stenosis, arthritis, BLE swelling, R>L, aortoiliac and arterial occlusive disease, s/p tissue biopsy of the left great toe in December w/ delayed healing     Patient returns to the office to review AOIL/LEAD 3/13/2025    -AOIL showed left distal TORRIE greater than 75% stenosis and right distal EIA 50 to 75% stenosis, celiac and SMA greater than 70% stenosis  -LEAD showed right CFA/proximal SFA/proximal popliteal 50 to 75% stenosis, right DAIJA is noncompressible/-/- and left EIA/CFA showed monophasic arterial waveforms, left DAIJA noncompressible/-/-  -continues with nonhealing left great toe biopsy site, ischemic ulcer and left lower extremity ischemic rest pain  -Recommended further evaluation with CT angiography with runoff to further plan revascularization options.  Last BMP 2024 creatinine 1.15/GFR 42.  Will repeat now prior to CTA.  -Recommended oral hydration pre and post angiography  -Continue aspirin 81 mg and simvastatin 80mg  -Follow-up after imaging with vascular surgeon to review study and discuss possible treatment options    Orders:    Ambulatory Referral to Vascular Surgery    CTA abdominal w run off w wo contrast; Future    Basic metabolic panel; Future      Chief Complaint   Patient presents with    Follow-up    Venous Disease     Pt states she has been getting swelling in BLE    Peripheral Vascular Disease     Pt states she has been getting claudication and cold extremities in BLE        History of Present Illness   Lachelle Salazar is a 89 y.o. female who presents to the office in follow-up to review aortoiliac and lower extremity arterial duplex.  She complains of severe pain in the left foot that is  worsened at night.  She is sleeping in recliner chair with her legs dependent.  She has dependent rubor of the left forefoot.  Left great toe ischemic ulceration, not healed at prior biopsy site. She is also now complaining of left great toe pain, ingrown nail   History obtained from: patient    Review of Systems   Cardiovascular:  Positive for leg swelling.   Skin:  Positive for wound.     Medical History Reviewed by provider this encounter:  Tobacco  Allergies  Meds  Problems  Med Hx  Surg Hx  Fam Hx     .     Objective   I have reviewed and made appropriate changes to the review of systems input by the medical assistant.    Vitals:    03/24/25 0851   BP: 130/62   BP Location: Left arm   Patient Position: Sitting   Cuff Size: Standard   Pulse: 86   SpO2: 98%   Weight: 70.5 kg (155 lb 6.4 oz)   Height: 5' (1.524 m)       Patient Active Problem List   Diagnosis    Type 2 diabetes mellitus with stage 3b chronic kidney disease, with long-term current use of insulin (HCC)    Glaucoma    Hyperlipidemia    Hypertension    Osteoarthritis    Osteopenia    Spinal stenosis    Vitamin D deficiency    Pain in both lower extremities    Lumbar foraminal stenosis    Onychomycosis    Diabetes mellitus with neuropathy ([facility])    Nonproliferative diabetic retinopathy of left eye ([facility])    Onychomycosis due to dermatophyte    Nail dystrophy    PAD (peripheral artery disease) ([facility])       Past Surgical History:   Procedure Laterality Date    ESOPHAGOGASTRODUODENOSCOPY      resolved 1/2002    HEMORRHOID SURGERY      resolved 2/1999    NEUROPLASTY / TRANSPOSITION MEDIAN NERVE AT CARPAL TUNNEL Right     last assessed 8/10/2012    RECTAL SURGERY         Family History   Problem Relation Age of Onset    Heart attack Mother         MI    Coronary artery disease Mother     Diabetes Mother         DM    Hypertension Mother     Stroke Mother         syndrome    Stroke Father         syndrome       Social History     Socioeconomic History     Marital status:      Spouse name: Not on file    Number of children: Not on file    Years of education: Not on file    Highest education level: Not on file   Occupational History    Occupation: retired   Tobacco Use    Smoking status: Never     Passive exposure: Never    Smokeless tobacco: Never   Vaping Use    Vaping status: Never Used   Substance and Sexual Activity    Alcohol use: No    Drug use: No    Sexual activity: Not on file   Other Topics Concern    Not on file   Social History Narrative    Always uses seat belt          Social Drivers of Health     Financial Resource Strain: Low Risk  (12/14/2023)    Overall Financial Resource Strain (CARDIA)     Difficulty of Paying Living Expenses: Not hard at all   Food Insecurity: No Food Insecurity (12/31/2024)    Hunger Vital Sign     Worried About Running Out of Food in the Last Year: Never true     Ran Out of Food in the Last Year: Never true   Transportation Needs: No Transportation Needs (12/31/2024)    PRAPARE - Transportation     Lack of Transportation (Medical): No     Lack of Transportation (Non-Medical): No   Physical Activity: Not on file   Stress: Not on file   Social Connections: Not on file   Intimate Partner Violence: Not on file   Housing Stability: Low Risk  (12/31/2024)    Housing Stability Vital Sign     Unable to Pay for Housing in the Last Year: No     Number of Times Moved in the Last Year: 0     Homeless in the Last Year: No       Allergies   Allergen Reactions    Ace Inhibitors Cough         Current Outpatient Medications:     aspirin 81 mg chewable tablet, Chew 81 mg daily, Disp: , Rfl:     brimonidine (ALPHAGAN P) 0.1 %, Apply to eye, Disp: , Rfl:     Calcium Carbonate-Vit D-Min (CALCIUM 1200) 8732-0046 MG-UNIT CHEW, Chew, Disp: , Rfl:     Cholecalciferol (VITAMIN D3) 5000 UNIT/ML LIQD, Take by mouth, Disp: , Rfl:     glucose blood (OneTouch Verio) test strip, Check blood sugar 3 times per day., Disp: 300 strip, Rfl: 3     insulin degludec (Tresiba FlexTouch) 100 units/mL injection pen, Inject 30 Units under the skin daily, Disp: 27 mL, Rfl: 1    insulin lispro (HumaLOG) 100 units/mL injection pen, Inject 10 Units under the skin 3 (three) times a day with meals And ISS, Disp: 30 mL, Rfl: 1    Insulin Pen Needle 32G X 4 MM MISC, Use 4 (four) times a day, Disp: 400 each, Rfl: 3    latanoprost (XALATAN) 0.005 % ophthalmic solution, Apply to eye, Disp: , Rfl:     losartan (COZAAR) 100 MG tablet, TAKE 1 TABLET BY MOUTH EVERY DAY, Disp: 90 tablet, Rfl: 3    OneTouch Delica Lancets 33G MISC, by Other route 3 (three) times a day Check blood sugar 4 times per day, Disp: 300 each, Rfl: 3    simvastatin (ZOCOR) 80 mg tablet, TAKE 1 TABLET BY MOUTH EVERY DAY, Disp: 90 tablet, Rfl: 1    betamethasone dipropionate (DIPROSONE) 0.05 % cream, Apply 1 Application topically 2 (two) times a day To affected area (Patient not taking: Reported on 3/24/2025), Disp: , Rfl:     furosemide (LASIX) 20 mg tablet, Take 1 tablet (20 mg total) by mouth daily (Patient not taking: Reported on 3/24/2025), Disp: 7 tablet, Rfl: 0    /62 (BP Location: Left arm, Patient Position: Sitting, Cuff Size: Standard)   Pulse 86   Ht 5' (1.524 m)   Wt 70.5 kg (155 lb 6.4 oz)   SpO2 98%   BMI 30.35 kg/m²      Physical Exam  Vitals and nursing note reviewed. Exam conducted with a chaperone present.   Constitutional:       Appearance: She is well-developed.   HENT:      Head: Normocephalic and atraumatic.   Cardiovascular:      Pulses:           Dorsalis pedis pulses are 0 on the right side and 0 on the left side.        Posterior tibial pulses are 0 on the right side and 0 on the left side.      Heart sounds: Normal heart sounds.   Pulmonary:      Effort: Pulmonary effort is normal.   Musculoskeletal:         General: Swelling present.      Cervical back: Neck supple.      Comments: BLE edema, R>L   Skin:     General: Skin is warm.      Comments: Left great toe ischemic  ulceration less than 5 mm.  See clinical image   Neurological:      General: No focal deficit present.      Mental Status: She is alert and oriented to person, place, and time.      Comments: Motor and sensation intact left foot   Psychiatric:         Behavior: Behavior normal.         Thought Content: Thought content normal.               Administrative Statements   I have spent a total time of 25 minutes in caring for this patient on the day of the visit/encounter including Diagnostic results, Prognosis, Risks and benefits of tx options, Instructions for management, Patient and family education, Importance of tx compliance, Risk factor reductions, Impressions, Counseling / Coordination of care, and Documenting in the medical record.

## 2025-03-25 ENCOUNTER — APPOINTMENT (OUTPATIENT)
Dept: LAB | Facility: CLINIC | Age: 89
End: 2025-03-25
Payer: COMMERCIAL

## 2025-03-25 DIAGNOSIS — I73.9 PAD (PERIPHERAL ARTERY DISEASE) (HCC): ICD-10-CM

## 2025-03-25 PROCEDURE — 36415 COLL VENOUS BLD VENIPUNCTURE: CPT

## 2025-03-25 PROCEDURE — 80048 BASIC METABOLIC PNL TOTAL CA: CPT

## 2025-03-26 ENCOUNTER — PATIENT MESSAGE (OUTPATIENT)
Dept: VASCULAR SURGERY | Facility: CLINIC | Age: 89
End: 2025-03-26

## 2025-03-26 ENCOUNTER — RESULTS FOLLOW-UP (OUTPATIENT)
Dept: VASCULAR SURGERY | Facility: CLINIC | Age: 89
End: 2025-03-26

## 2025-03-26 LAB
ANION GAP SERPL CALCULATED.3IONS-SCNC: 11 MMOL/L (ref 4–13)
BUN SERPL-MCNC: 27 MG/DL (ref 5–25)
CALCIUM SERPL-MCNC: 9.9 MG/DL (ref 8.4–10.2)
CHLORIDE SERPL-SCNC: 105 MMOL/L (ref 96–108)
CO2 SERPL-SCNC: 27 MMOL/L (ref 21–32)
CREAT SERPL-MCNC: 1.11 MG/DL (ref 0.6–1.3)
GFR SERPL CREATININE-BSD FRML MDRD: 44 ML/MIN/1.73SQ M
GLUCOSE P FAST SERPL-MCNC: 130 MG/DL (ref 65–99)
POTASSIUM SERPL-SCNC: 5 MMOL/L (ref 3.5–5.3)
SODIUM SERPL-SCNC: 143 MMOL/L (ref 135–147)

## 2025-03-26 NOTE — PATIENT COMMUNICATION
Vilma Ochoa MA  3/26/2025  2:21 PM EDT Back to Top      Called and s/w pt, informed patient and her daughter. Relayed instructions for medication hold, lab work, and hydration. They verbalized understanding.    SHAI Sánchez  3/26/2025  9:45 AM EDT       GFR is 44. Will initiate ARNALDO prevention protocol for GFR <60  -Instruct patient to hold ACE/ARB and diuretics (Lasix, losartan) the day prior to and day of CTA.  Hold NSAIDs for 7 days prior to CTA.  -IV fluids at 3 mL/kg over 1 hour to be given 1 hour prior to procedure and postprocedural IV fluids at 1.25 mL/kg an hour over 4 hours post CTA.    -Repeat BMP 48 after CTA     Let me know when CTA is scheduled and I can order hydration

## 2025-03-27 ENCOUNTER — TELEPHONE (OUTPATIENT)
Age: 89
End: 2025-03-27

## 2025-03-27 RX ORDER — SODIUM CHLORIDE 9 MG/ML
1.25 INJECTION, SOLUTION INTRAVENOUS CONTINUOUS
OUTPATIENT
Start: 2025-03-27

## 2025-03-27 RX ORDER — SODIUM CHLORIDE 9 MG/ML
3 INJECTION, SOLUTION INTRAVENOUS CONTINUOUS
OUTPATIENT
Start: 2025-03-27

## 2025-03-27 NOTE — TELEPHONE ENCOUNTER
Hydration scheduled for Jaswant on 4/9/25 @ 0830. CTA is at 10 AM. Apt 4/22/25 @ 10:00 AM in [place] w/ Dr. Newman. Called and s/w daughter, informed her of times, dates, and locations. She was agreeable to all.

## 2025-03-27 NOTE — TELEPHONE ENCOUNTER
Caller: Samra - daughter    Doctor: Ligia Patten    Reason for call: Patient received call that CT would have to be rescheduled with  hydration. She would like to know if we can let her know as soon as possible when the CT will be done. She is concerned since her mother was told to stop NSAIDs 7 days prior to CT and she has not taken any for pain today.    Call back#: [phone]

## 2025-03-29 DIAGNOSIS — I10 PRIMARY HYPERTENSION: ICD-10-CM

## 2025-03-31 RX ORDER — LOSARTAN POTASSIUM 100 MG/1
100 TABLET ORAL DAILY
Qty: 90 TABLET | Refills: 1 | Status: SHIPPED | OUTPATIENT
Start: 2025-03-31

## 2025-04-01 ENCOUNTER — TELEPHONE (OUTPATIENT)
Dept: ADMINISTRATIVE | Facility: HOSPITAL | Age: 89
End: 2025-04-01

## 2025-04-01 NOTE — TELEPHONE ENCOUNTER
Called pt and informed her on the appointment date change from 04/22/25 to 04/24/25 with [facility]. She understood.

## 2025-04-02 ENCOUNTER — HOSPITAL ENCOUNTER (EMERGENCY)
Facility: HOSPITAL | Age: 89
Discharge: HOME | End: 2025-04-02
Attending: EMERGENCY MEDICINE
Payer: COMMERCIAL

## 2025-04-02 ENCOUNTER — APPOINTMENT (EMERGENCY)
Dept: RADIOLOGY | Facility: HOSPITAL | Age: 89
End: 2025-04-02
Payer: COMMERCIAL

## 2025-04-02 VITALS
DIASTOLIC BLOOD PRESSURE: 105 MMHG | RESPIRATION RATE: 18 BRPM | HEART RATE: 92 BPM | SYSTOLIC BLOOD PRESSURE: 201 MMHG | TEMPERATURE: 97.7 F | OXYGEN SATURATION: 99 %

## 2025-04-02 DIAGNOSIS — I73.9 PAD (PERIPHERAL ARTERY DISEASE) (HCC): Primary | ICD-10-CM

## 2025-04-02 DIAGNOSIS — M79.604 PAIN IN BOTH LOWER EXTREMITIES: ICD-10-CM

## 2025-04-02 DIAGNOSIS — M79.605 PAIN IN BOTH LOWER EXTREMITIES: ICD-10-CM

## 2025-04-02 PROBLEM — M79.675 GREAT TOE PAIN, LEFT: Status: ACTIVE | Noted: 2025-04-02

## 2025-04-02 PROBLEM — I74.09 AORTOILIAC OCCLUSIVE DISEASE (HCC): Status: ACTIVE | Noted: 2025-04-02

## 2025-04-02 PROBLEM — R60.0 BILATERAL LOWER EXTREMITY EDEMA: Status: ACTIVE | Noted: 2025-04-02

## 2025-04-02 PROBLEM — E11.9 DIABETES (HCC): Status: ACTIVE | Noted: 2025-04-02

## 2025-04-02 LAB
ANION GAP SERPL CALCULATED.3IONS-SCNC: 8 MMOL/L (ref 4–13)
APTT PPP: 26 SECONDS (ref 23–34)
BASOPHILS # BLD AUTO: 0.1 THOUSANDS/ÂΜL (ref 0–0.1)
BASOPHILS NFR BLD AUTO: 1 % (ref 0–1)
BUN SERPL-MCNC: 27 MG/DL (ref 5–25)
CALCIUM SERPL-MCNC: 9.7 MG/DL (ref 8.4–10.2)
CHLORIDE SERPL-SCNC: 105 MMOL/L (ref 96–108)
CO2 SERPL-SCNC: 26 MMOL/L (ref 21–32)
CREAT SERPL-MCNC: 1.14 MG/DL (ref 0.6–1.3)
EOSINOPHIL # BLD AUTO: 0.14 THOUSAND/ÂΜL (ref 0–0.61)
EOSINOPHIL NFR BLD AUTO: 2 % (ref 0–6)
ERYTHROCYTE [DISTWIDTH] IN BLOOD BY AUTOMATED COUNT: 12.7 % (ref 11.6–15.1)
EST. AVERAGE GLUCOSE BLD GHB EST-MCNC: 160 MG/DL
GFR SERPL CREATININE-BSD FRML MDRD: 42 ML/MIN/1.73SQ M
GLUCOSE SERPL-MCNC: 151 MG/DL (ref 65–140)
HBA1C MFR BLD: 7.2 %
HCT VFR BLD AUTO: 46.6 % (ref 34.8–46.1)
HGB BLD-MCNC: 14.7 G/DL (ref 11.5–15.4)
IMM GRANULOCYTES # BLD AUTO: 0.04 THOUSAND/UL (ref 0–0.2)
IMM GRANULOCYTES NFR BLD AUTO: 1 % (ref 0–2)
INR PPP: 0.89 (ref 0.85–1.19)
LYMPHOCYTES # BLD AUTO: 2.04 THOUSANDS/ÂΜL (ref 0.6–4.47)
LYMPHOCYTES NFR BLD AUTO: 24 % (ref 14–44)
MCH RBC QN AUTO: 29.8 PG (ref 26.8–34.3)
MCHC RBC AUTO-ENTMCNC: 31.5 G/DL (ref 31.4–37.4)
MCV RBC AUTO: 94 FL (ref 82–98)
MONOCYTES # BLD AUTO: 0.71 THOUSAND/ÂΜL (ref 0.17–1.22)
MONOCYTES NFR BLD AUTO: 8 % (ref 4–12)
NEUTROPHILS # BLD AUTO: 5.61 THOUSANDS/ÂΜL (ref 1.85–7.62)
NEUTS SEG NFR BLD AUTO: 64 % (ref 43–75)
NRBC BLD AUTO-RTO: 0 /100 WBCS
PLATELET # BLD AUTO: 219 THOUSANDS/UL (ref 149–390)
PMV BLD AUTO: 9.9 FL (ref 8.9–12.7)
POTASSIUM SERPL-SCNC: 5.3 MMOL/L (ref 3.5–5.3)
PROTHROMBIN TIME: 12.3 SECONDS (ref 12.3–15)
RBC # BLD AUTO: 4.94 MILLION/UL (ref 3.81–5.12)
SODIUM SERPL-SCNC: 139 MMOL/L (ref 135–147)
WBC # BLD AUTO: 8.64 THOUSAND/UL (ref 4.31–10.16)

## 2025-04-02 PROCEDURE — 85610 PROTHROMBIN TIME: CPT | Performed by: PHYSICIAN ASSISTANT

## 2025-04-02 PROCEDURE — 85025 COMPLETE CBC W/AUTO DIFF WBC: CPT | Performed by: PHYSICIAN ASSISTANT

## 2025-04-02 PROCEDURE — 99284 EMERGENCY DEPT VISIT MOD MDM: CPT | Performed by: PHYSICIAN ASSISTANT

## 2025-04-02 PROCEDURE — 99215 OFFICE O/P EST HI 40 MIN: CPT | Performed by: SURGERY

## 2025-04-02 PROCEDURE — 80048 BASIC METABOLIC PNL TOTAL CA: CPT | Performed by: PHYSICIAN ASSISTANT

## 2025-04-02 PROCEDURE — 36415 COLL VENOUS BLD VENIPUNCTURE: CPT | Performed by: PHYSICIAN ASSISTANT

## 2025-04-02 PROCEDURE — 99284 EMERGENCY DEPT VISIT MOD MDM: CPT

## 2025-04-02 PROCEDURE — 75635 CT ANGIO ABDOMINAL ARTERIES: CPT

## 2025-04-02 PROCEDURE — 73630 X-RAY EXAM OF FOOT: CPT

## 2025-04-02 PROCEDURE — 83036 HEMOGLOBIN GLYCOSYLATED A1C: CPT | Performed by: PHYSICIAN ASSISTANT

## 2025-04-02 PROCEDURE — 85730 THROMBOPLASTIN TIME PARTIAL: CPT | Performed by: PHYSICIAN ASSISTANT

## 2025-04-02 RX ADMIN — SODIUM CHLORIDE 250 ML: 0.9 INJECTION, SOLUTION INTRAVENOUS at 12:06

## 2025-04-02 RX ADMIN — IOHEXOL 100 ML: 350 INJECTION, SOLUTION INTRAVENOUS at 12:45

## 2025-04-02 NOTE — DISCHARGE INSTRUCTIONS
--Leg elevation at rest  --Recommend not sleeping in recliner w/ feet dependent  --ACE wrap or compression stockings to be worn daily.  May remove at bedtime  --F/u w/ vascular surgery, Dr. Newman as planned  --F/u w/ PCP-- ? Start diuretic. Eval for leg swelling

## 2025-04-02 NOTE — CONSULTS
Consultation - Vascular Surgery   Name: Lachelle Salazar 89 y.o. female I MRN: [id number]  Unit/Bed#: [facility]B I Date of Admission: 4/2/2025   Date of Service: 4/2/2025 I Hospital Day: 0   Inpatient consult to Vascular Surgery  Consult performed by: Heavenly Camarena PA-C  Consult ordered by: Lilia Colón PA-C        Physician Requesting Evaluation: Catherine Peres MD   Reason for Evaluation / Principal Problem: PAD w/ BLE pain    Assessment & Plan  Aortoiliac occlusive disease (HCC)  --LEAD 3/13/2025- R: 50-75% CFA, prox SFA & prox popl.DAIJA unreliable NCV. MTP/ GTP unobtainable 2* absent PPG tracing. -L: monophasic EIA & CFA w/ sugg prox dz DAIJA unreliable NCV. MTP/ GTP unobtainable 2* absent PPG tracing.  --AIOL 3/13/2025: Ao 1.9x 1.9cm. >75% L distal ANTHONY. 50-75% R distal EIA/ CFA. Patent b/l renals. Celiac (516/126) & SMA (816/256)  elev'd velocities sugg >70% stenosis   --CTA (p)- further recommendations pending imaging  --cont ASA  --cont statin      Addendum:   CTA-75% Celiac & SMA ostia. B/L 50% renal ostia.   -R CFA 50%. 75% disatal SFA. 75% popl P1 segm. PRox AT occlusion w/ short interval recon. Patent 3V r/o  -L 50% ostial SFA, 75% distal. 75% popl P1 segm.  Patent 3V r/o    --Recommend compression (ACE or stockings) to be worn daily. May remove at bedtime  --Recommend leg elevation at rest and refraining from sleeping in recliner w/ feet dependant  --Recommend f/u PCP for w/u of leg edema, ? Diuretic  --Keep appt as planned w/ Dr. Newman 4/24/2025 at 10am, [place] office  --consider agram however, unsure benefits outweigh risk given symptomatology      *Seen w/ Dr. Castorena Doctor      Bilateral lower extremity edema  --LEVD 2/21/2025: B neg DVT  --consider further workup- ? Echo (Last echo 10/23/2020 Mild MS/ MR. Mild TR)  --consider diuretic  --recommend f/u w/ PCP    Great toe pain, left  --L foot XR (p)  HTN (hypertension)  --Losartan  HLD (hyperlipidemia)  --Zocor    Diabetes (HCC)    Lab Results  "  Component Value Date    HGBA1C 7.2 (H) 12/17/2024   --Insulin            _____________________________________________  Physician Requesting Consult: Catherine Peres MD    Additional consultants:     Reason for Consult / Principal Problem: PAD w/ BLE pain    HPI: Lachelle Salazar is a 89 y.o. year old female  with HTN, HLD, DM (insulin), AIOD/ PAD who is previously been evaluated in the vascular center with plan for CTA and office visit follow-up in the next couple of weeks who presents to the [facility] emergency department with complaint of intermittent but worsening left thigh cramping along with left great toe pain.  She has been doctoring for these complaints for 1 to 2 months.  She notes initially having started with left great toe pain for which she had seen a podiatrist.  A biopsy was performed of the left great toe which \"showed too much iron.\" This biopsy site has been slow to heal.  Then, the concern was for a possible ingrown toenail.  She also complains of bilateral lower extremity swelling.  The toe is extremely tender to touch and even to the seam of a sock.  This has been affecting her ambulation.  It does not sound like she has had overt claudication symptoms nor rest pain.  She does describe intermittent left medial thigh cramping which occurs mostly at night. She attends chair yoga. She does 1 hour or Alissa gold ~3x/week for which she needs to sit down after class do to back pain and foot pain but not do to limb pain nor cramping.   She denies prior vascular intervention.  She denies any ill feeling otherwise to include chest pain, shortness of breath, fever, chills, nor sweats.  With known vascular disease, consultation has been placed from the emergency department for evaluation.  CTA is pending.    Prior vascular imaging:  --LEVD 2/21/2025: B neg DVT  --LEAD 3/13/2025- R: 50-75% CFA, prox SFA & prox popl.DAIJA unreliable NCV. MTP/ GTP unobtainable 2* absent PPG tracing. -L: monophasic " EIA & CFA w/ sugg prox dz DAIJA unreliable NCV. MTP/ GTP unobtainable 2* absent PPG tracing.  --AIOL 3/13/2025: Ao 1.9x 1.9cm. >75% L distal ANTHONY. 50-75% R distal EIA/ CFA. Patent b/l renals. Celiac (516/126) & SMA (816/256)  elev'd velocities sugg >70% stenosis       Historical Information   Past Medical History:   Diagnosis Date    Aorto-iliac disease (HCC)     Carpal tunnel syndrome     Choledocholithiasis     resolved 3/6/2015    Diverticulosis     DM (diabetes mellitus) (HCC)     HLD (hyperlipidemia)     HTN (hypertension)     Inguinal hernia     PAD (peripheral artery disease) (HCC)     Sciatica      Past Surgical History:   Procedure Laterality Date    ESOPHAGOGASTRODUODENOSCOPY      resolved 1/2002    HEMORRHOID SURGERY      resolved 2/1999    NEUROPLASTY / TRANSPOSITION MEDIAN NERVE AT CARPAL TUNNEL Right     last assessed 8/10/2012    RECTAL SURGERY       Social History   Social History     Substance and Sexual Activity   Alcohol Use No     Social History     Substance and Sexual Activity   Drug Use No     Social History     Tobacco Use   Smoking Status Never    Passive exposure: Never   Smokeless Tobacco Never     Family History:   Family History   Problem Relation Age of Onset    Heart attack Mother         MI    Coronary artery disease Mother     Diabetes Mother         DM    Hypertension Mother     Stroke Mother         syndrome    Stroke Father         syndrome   }    Meds/Allergies   Home meds:     Scheduled Meds:      Continuous Infusions:No current facility-administered medications for this encounter.    PRN Meds:      ALLERGIES:   Allergies   Allergen Reactions    Ace Inhibitors Cough       Review of Systems:  General: positive for  - as noted in HPI  Cardiovascular: no chest pain or dyspnea on exertion  Respiratory: no cough, shortness of breath, or wheezing  Gastrointestinal: no abdominal pain, change in bowel habits, or black or bloody stools  Genitourinary: no dysuria, trouble voiding, or  hematuria  Musculoskeletal: positive for - as noted in HPI  Neurological: no TIA or stroke symptoms  Hematological and Lymphatic: negative  Dermatological : positive for as noted in HPI  Psychological: negative  Ophthalmic: negative  ENT: negative      Objective   Vitals:  Blood pressure (!) 201/105, pulse 92, temperature 97.7 °F (36.5 °C), temperature source Temporal, resp. rate 18, SpO2 99%.  There is no height or weight on file to calculate BMI.        I/Os:  I/O       None            Invasive Lines/Tubes:  Invasive Devices       Peripheral Intravenous Line  Duration             Peripheral IV 04/02/25 Distal;Right;Ventral (anterior) Forearm <1 day    Peripheral IV 04/02/25 Left;Ventral (anterior) Forearm <1 day                    Physical Exam  General appearance: alert, appears stated age, and cooperative.  Accompanied by son at bedside  Head: Normocephalic, without obvious abnormality, atraumatic  Eyes: conjunctivae/corneas clear. PERRL, EOM's intact.  Throat: lips, mucosa, and tongue normal; teeth and gums normal  Neck: no adenopathy, no JVD, and supple, symmetrical, trachea midline  Back: negative  Lungs:  breathing non-labored  Chest wall: no tenderness  Heart:: regular rate and rhythm and S1, S2 normal  Abdomen: soft, non-tender; bowel sounds normal; no masses,  no organomegaly  Genitalia: deferred  Rectal: deferred  Extremities:  BLE 3+ pitting edema w/ mild erythema and shine to skin. + Capillary refill. Warm to touch. + M/S .  Nonthreatened  -L GT tender to touch. Punctate area anterior toe of prior biopsy site  Skin: Skin color, texture, turgor normal. No rashes or lesions  Neurologic: Grossly normal        Pulse exam:  Radial: Right: 2+               Left: 2+  Femoral: Right: 1+                   Left: 1+  DP: Right: 1+ and doppler signal          Left: doppler signal  PT: Right: 1+ and doppler signal         Left: doppler signal    Lab Results and Cultures:   COVID:   Last COVID19 Screening Values        None           CBC:   Results from last 7 days   Lab Units 04/02/25  1209   WBC Thousand/uL 8.64   HEMOGLOBIN g/dL 14.7   HEMATOCRIT % 46.6*   PLATELETS Thousands/uL 219     BMP/CMP:  Results from last 7 days   Lab Units 04/02/25  1209   POTASSIUM mmol/L 5.3   CHLORIDE mmol/L 105   CO2 mmol/L 26   BUN mg/dL 27*   CREATININE mg/dL 1.14   CALCIUM mg/dL 9.7     Coags:   Results from last 7 days   Lab Units 04/02/25  1209   INR  0.89   PTT seconds 26     Lipid panel:     HgbA1c:   Lab Results   Component Value Date    HGBA1C 7.2 (H) 12/17/2024    HGBA1C 7.4 (H) 06/17/2024    HGBA1C 6.9 (H) 12/06/2023    HGBA1C 6.4 (H) 06/01/2023    HGBA1C 6.4 (H) 11/22/2022         Imaging Studies:     CTA (p)  L foot XR (p)    EKG, Pathology, and Other Studies:   VTE Prophylaxis:      Code Status: No Order  Advance Directive and Living Will:      Power of :    POLST:        Heavenly Camarena PA-C  4/2/2025

## 2025-04-02 NOTE — ASSESSMENT & PLAN NOTE
Lab Results   Component Value Date    HGBA1C 7.2 (H) 12/17/2024   --Insulin    
--L foot XR (p)  
--LEAD 3/13/2025- R: 50-75% CFA, prox SFA & prox popl.DAIJA unreliable NCV. MTP/ GTP unobtainable 2* absent PPG tracing. -L: monophasic EIA & CFA w/ sugg prox dz DAIJA unreliable NCV. MTP/ GTP unobtainable 2* absent PPG tracing.  --AIOL 3/13/2025: Ao 1.9x 1.9cm. >75% L distal ANTHONY. 50-75% R distal EIA/ CFA. Patent b/l renals. Celiac (516/126) & SMA (816/256)  elev'd velocities sugg >70% stenosis   --CTA (p)- further recommendations pending imaging  --cont ASA  --cont statin      Addendum:   CTA-75% Celiac & SMA ostia. B/L 50% renal ostia.   -R CFA 50%. 75% disatal SFA. 75% popl P1 segm. PRox AT occlusion w/ short interval recon. Patent 3V r/o  -L 50% ostial SFA, 75% distal. 75% popl P1 segm.  Patent 3V r/o    --Recommend compression (ACE or stockings) to be worn daily. May remove at bedtime  --Recommend leg elevation at rest and refraining from sleeping in recliner w/ feet dependant  --Recommend f/u PCP for w/u of leg edema, ? Diuretic  --Keep appt as planned w/ Dr. Newman 4/24/2025 at 10am, [place] office  --consider agram however, unsure benefits outweigh risk given symptomatology      *Seen w/ Dr. Kell Mi      
--LEVD 2/21/2025: B neg DVT  --consider further workup- ? Echo (Last echo 10/23/2020 Mild MS/ MR. Mild TR)  --consider diuretic  --recommend f/u w/ PCP    
--Losartan  
--Zocor    
100

## 2025-04-02 NOTE — ED PROVIDER NOTES
Time reflects when diagnosis was documented in both MDM as applicable and the Disposition within this note       Time User Action Codes Description Comment    4/2/2025 12:14 PM Eldon Lilia L Add [I73.9] PAD (peripheral artery disease) (HCC)     4/2/2025 12:14 PM Lilia Colón Add [M79.604,  M79.605] Pain in both lower extremities           ED Disposition       ED Disposition   Discharge    Condition   Stable    Date/Time   Wed Apr 2, 2025  3:26 PM    Comment   Lachelle Salazar discharge to home/self care.                   Assessment & Plan       Medical Decision Making  Differential diagnosis includes but is not limited to: PAD, dm ulcer, osteomyelitis, cellulitis, ARNALDO, venous stasis    Amount and/or Complexity of Data Reviewed  External Data Reviewed: radiology.     Details: Venous and arterial US, skin biopsy, labs including uric acid  Labs: ordered.     Details: Results reviewed  Radiology: ordered and independent interpretation performed.     Details: Report reviewed  Discussion of management or test interpretation with external provider(s): Please refer to vascular consult note.    Risk  Prescription drug management.        ED Course as of 04/02/25 1557   Wed Apr 02, 2025   1214 Vascular will consult   1231 Case discussed with podiatry and image reviewed, they do not feel they have more to add at this time since there is not a wound, they are leaning towards this being a vascular issue, will a/w labs and CT results   1448 Vascular notified CTA results are back       Medications   sodium chloride 0.9 % bolus 250 mL (0 mL Intravenous Stopped 4/2/25 1300)   iohexol (OMNIPAQUE) 350 MG/ML injection (MULTI-DOSE) 100 mL (100 mL Intravenous Given 4/2/25 1245)       ED Risk Strat Scores                                                History of Present Illness       Chief Complaint   Patient presents with    Toe Pain     Pt reports problems with ingrown toe nails. Saw podiatrist and got it removed. Since then, having  increased pain on L great toe. Also c/o R calf swelling which she has been seen for already. Hx HTN, reports took bp meds this morning        Past Medical History:   Diagnosis Date    Aorto-iliac disease (HCC)     Carpal tunnel syndrome     Choledocholithiasis     resolved 3/6/2015    Diverticulosis     DM (diabetes mellitus) (HCC)     HLD (hyperlipidemia)     HTN (hypertension)     Inguinal hernia     PAD (peripheral artery disease) (HCC)     Sciatica       Past Surgical History:   Procedure Laterality Date    ESOPHAGOGASTRODUODENOSCOPY      resolved 1/2002    HEMORRHOID SURGERY      resolved 2/1999    NEUROPLASTY / TRANSPOSITION MEDIAN NERVE AT CARPAL TUNNEL Right     last assessed 8/10/2012    RECTAL SURGERY        Family History   Problem Relation Age of Onset    Heart attack Mother         MI    Coronary artery disease Mother     Diabetes Mother         DM    Hypertension Mother     Stroke Mother         syndrome    Stroke Father         syndrome      Social History     Tobacco Use    Smoking status: Never     Passive exposure: Never    Smokeless tobacco: Never   Vaping Use    Vaping status: Never Used   Substance Use Topics    Alcohol use: No    Drug use: No      E-Cigarette/Vaping    E-Cigarette Use Never User       E-Cigarette/Vaping Substances    Nicotine No     THC No     CBD No     Flavoring No     Other No     Unknown No       I have reviewed and agree with the history as documented.     89yo female who presents to ER for evaluation of left great toe pain and swelling. States in February she saw the podiatrist who performed a biopsy and removed part of her ingrown toe nail. Since then she has had increased pain. Described as burning and throbbing, worse with walking, improved with rest. States she has had venous and arterial US which showed a blockage therefore she was referred to vascular. She also has an appointment for a CTA of her lower legs next week. She has also been following with her pcp who  tried her on a diuretic for the leg swelling, however she does not feel it helped.       History provided by:  Patient      Review of Systems   Constitutional:  Negative for chills and fever.   Respiratory:  Negative for shortness of breath.    Cardiovascular:  Positive for leg swelling. Negative for chest pain.   Gastrointestinal:  Negative for vomiting.   Musculoskeletal:  Negative for back pain.   Skin:  Positive for rash.   Neurological:  Negative for weakness and numbness.           Objective       ED Triage Vitals [25 1107]   Temperature Pulse Blood Pressure Respirations SpO2 Patient Position - Orthostatic VS   97.7 °F (36.5 °C) 92 (!) 201/105 18 99 % Sitting      Temp Source Heart Rate Source BP Location FiO2 (%) Pain Score    Temporal Monitor Left arm -- --      Vitals      Date and Time Temp Pulse SpO2 Resp BP Pain Score FACES Pain Rating User   25 1107 97.7 °F (36.5 °C) 92 99 % 18 201/105 -- --             Physical Exam  Vitals and nursing note reviewed.   Constitutional:       Appearance: Normal appearance.   HENT:      Head: Atraumatic.      Nose: Nose normal.   Eyes:      General: No scleral icterus.  Cardiovascular:      Rate and Rhythm: Normal rate and regular rhythm.      Pulses:           Posterior tibial pulses are 1+ on the right side and 1+ on the left side.      Heart sounds: Normal heart sounds.   Pulmonary:      Effort: Pulmonary effort is normal.      Breath sounds: Normal breath sounds.   Musculoskeletal:      Right lower le+ Pitting Edema present.      Left lower le+ Pitting Edema present.      Right ankle: Normal pulse.      Left ankle: Normal pulse.      Right foot: Normal range of motion and normal capillary refill. No swelling or tenderness.      Left foot: Decreased range of motion (mild). Normal capillary refill. Swelling and tenderness present.        Feet:    Skin:     General: Skin is warm and dry.      Capillary Refill: Capillary refill takes less than 2  seconds.   Neurological:      Mental Status: She is alert and oriented to person, place, and time.   Psychiatric:         Mood and Affect: Mood normal.         Results Reviewed       Procedure Component Value Units Date/Time    Hemoglobin A1C [[id number]]  (Abnormal) Collected: 04/02/25 1209    Lab Status: Final result Specimen: Blood from Arm, Right Updated: 04/02/25 1532     Hemoglobin A1C 7.2 %       mg/dl     Basic metabolic panel [[id number]]  (Abnormal) Collected: 04/02/25 1209    Lab Status: Final result Specimen: Blood from Arm, Right Updated: 04/02/25 1245     Sodium 139 mmol/L      Potassium 5.3 mmol/L      Chloride 105 mmol/L      CO2 26 mmol/L      ANION GAP 8 mmol/L      BUN 27 mg/dL      Creatinine 1.14 mg/dL      Glucose 151 mg/dL      Calcium 9.7 mg/dL      eGFR 42 ml/min/1.73sq m     Narrative:      National Kid[employer] Foundation guidelines for Chronic Kidney Disease (CKD):     Stage 1 with normal or high GFR (GFR > 90 mL/min/1.73 square meters)    Stage 2 Mild CKD (GFR = 60-89 mL/min/1.73 square meters)    Stage 3A Moderate CKD (GFR = 45-59 mL/min/1.73 square meters)    Stage 3B Moderate CKD (GFR = 30-44 mL/min/1.73 square meters)    Stage 4 Severe CKD (GFR = 15-29 mL/min/1.73 square meters)    Stage 5 End Stage CKD (GFR <15 mL/min/1.73 square meters)  Note: GFR calculation is accurate only with a steady state creatinine    Protime-INR [[id number]]  (Normal) Collected: 04/02/25 1209    Lab Status: Final result Specimen: Blood from Arm, Right Updated: 04/02/25 1237     Protime 12.3 seconds      INR 0.89    Narrative:      INR Therapeutic Range    Indication                                             INR Range      Atrial Fibrillation                                               2.0-3.0  Hypercoagulable State                                    2.0.2.3  Left Ventricular Asist Device                            2.0-3.0  Mechanical Heart Valve                                  -    Aortic(with  afib, MI, embolism, HF, LA enlargement,    and/or coagulopathy)                                     2.0-3.0 (2.5-3.5)     Mitral                                                             2.5-3.5  Prosthetic/Bioprosthetic Heart Valve               2.0-3.0  Venous thromboembolism (VTE: VT, PE        2.0-3.0    APTT [[id number]]  (Normal) Collected: 04/02/25 1209    Lab Status: Final result Specimen: Blood from Arm, Right Updated: 04/02/25 1237     PTT 26 seconds     CBC and differential [[id number]]  (Abnormal) Collected: 04/02/25 1209    Lab Status: Final result Specimen: Blood from Arm, Right Updated: 04/02/25 1226     WBC 8.64 Thousand/uL      RBC 4.94 Million/uL      Hemoglobin 14.7 g/dL      Hematocrit 46.6 %      MCV 94 fL      MCH 29.8 pg      MCHC 31.5 g/dL      RDW 12.7 %      MPV 9.9 fL      Platelets 219 Thousands/uL      nRBC 0 /100 WBCs      Segmented % 64 %      Immature Grans % 1 %      Lymphocytes % 24 %      Monocytes % 8 %      Eosinophils Relative 2 %      Basophils Relative 1 %      Absolute Neutrophils 5.61 Thousands/µL      Absolute Immature Grans 0.04 Thousand/uL      Absolute Lymphocytes 2.04 Thousands/µL      Absolute Monocytes 0.71 Thousand/µL      Eosinophils Absolute 0.14 Thousand/µL      Basophils Absolute 0.10 Thousands/µL             XR foot 3+ views LEFT   ED Interpretation by Lilia Colón PA-C (04/02 1432)   NAD      Final Interpretation by Storm Pantoja MD (04/02 5198)      No acute osseous abnormality.         Computerized Assisted Algorithm (CAA) may have been used to analyze all applicable images.         Workstation performed: [id number]         CTA abdominal w run off w wo contrast   Final Interpretation by Temo Rodriguez MD (04/02 1422)      The visualized lower abdominal aorta is of normal caliber demonstrating moderate calcific atherosclerotic disease without stenosis or focal abnormality.   The bilateral common and external iliac arteries are patent  without significant stenosis.      RIGHT:   50% common femoral artery stenosis   Segmental distal stenosis of the SFA up to 75%.   75% P1 popliteal artery stenosis.   The proximal anterior tibial artery is occluded with short interval reconstitution, patent three-vessel runoff afterwards.      LEFT:   50% SFA ostial stenosis. Segmental 75% distal stenosis.   75% stenosis of the P1 popliteal artery.   Patent three-vessel runoff.      Symmetric bilateral lower leg edema.      1.2 cm cystic-appearing distal pancreatic body lesion.   Postcontrast MRI/MRCP recommended for initial further evaluation.      The study was marked in EPIC for immediate notification.      Workstation performed: [id number]             Procedures    ED Medication and Procedure Management   Prior to Admission Medications   Prescriptions Last Dose Informant Patient Reported? Taking?   Calcium Carbonate-Vit D-Min (CALCIUM 1200) 1751-0626 MG-UNIT CHEW  Self Yes No   Sig: Chew   Cholecalciferol (VITAMIN D3) 5000 UNIT/ML LIQD  Self Yes No   Sig: Take by mouth   Insulin Pen Needle 32G X 4 MM MISC  Self No No   Sig: Use 4 (four) times a day   OneTouch Delica Lancets 33G MISC  Self No No   Sig: by Other route 3 (three) times a day Check blood sugar 4 times per day   aspirin 81 mg chewable tablet  Self Yes No   Sig: Chew 81 mg daily   betamethasone dipropionate (DIPROSONE) 0.05 % cream  Self Yes No   Sig: Apply 1 Application topically 2 (two) times a day To affected area   Patient not taking: Reported on 3/24/2025   brimonidine (ALPHAGAN P) 0.1 %  Self Yes No   Sig: Apply to eye   furosemide (LASIX) 20 mg tablet  Self No No   Sig: Take 1 tablet (20 mg total) by mouth daily   Patient not taking: Reported on 3/24/2025   glucose blood (OneTouch Verio) test strip  Self No No   Sig: Check blood sugar 3 times per day.   insulin degludec (Tresiba FlexTouch) 100 units/mL injection pen  Self No No   Sig: Inject 30 Units under the skin daily   insulin lispro  (HumaLOG) 100 units/mL injection pen  Self No No   Sig: Inject 10 Units under the skin 3 (three) times a day with meals And ISS   latanoprost (XALATAN) 0.005 % ophthalmic solution  Self Yes No   Sig: Apply to eye   losartan (COZAAR) 100 MG tablet   No No   Sig: TAKE 1 TABLET BY MOUTH EVERY DAY   simvastatin (ZOCOR) 80 mg tablet  Self No No   Sig: TAKE 1 TABLET BY MOUTH EVERY DAY      Facility-Administered Medications: None     Discharge Medication List as of 4/2/2025  3:28 PM        CONTINUE these medications which have NOT CHANGED    Details   aspirin 81 mg chewable tablet Chew 81 mg daily, Historical Med      betamethasone dipropionate (DIPROSONE) 0.05 % cream Apply 1 Application topically 2 (two) times a day To affected area, Starting Fri 12/6/2024, Historical Med      brimonidine (ALPHAGAN P) 0.1 % Apply to eye, Starting Mon 3/7/2011, Historical Med      Calcium Carbonate-Vit D-Min (CALCIUM 1200) 5424-0488 MG-UNIT CHEW Chew, Starting Tue 11/14/2017, Historical Med      Cholecalciferol (VITAMIN D3) 5000 UNIT/ML LIQD Take by mouth, Starting Wed 5/10/2017, Historical Med      furosemide (LASIX) 20 mg tablet Take 1 tablet (20 mg total) by mouth daily, Starting Wed 2/26/2025, Normal      glucose blood (OneTouch Verio) test strip Check blood sugar 3 times per day., Normal      insulin degludec (Tresiba FlexTouch) 100 units/mL injection pen Inject 30 Units under the skin daily, Starting Fri 1/31/2025, Normal      insulin lispro (HumaLOG) 100 units/mL injection pen Inject 10 Units under the skin 3 (three) times a day with meals And ISS, Starting Wed 8/14/2024, Normal      Insulin Pen Needle 32G X 4 MM MISC Use 4 (four) times a day, Starting Tue 6/25/2024, Normal      latanoprost (XALATAN) 0.005 % ophthalmic solution Apply to eye, Starting Thu 4/18/2013, Historical Med      losartan (COZAAR) 100 MG tablet TAKE 1 TABLET BY MOUTH EVERY DAY, Starting Mon 3/31/2025, Normal      OneTouch Delica Lancets 33G MISC by Other route  3 (three) times a day Check blood sugar 4 times per day, Starting u 9/17/2020, Normal      simvastatin (ZOCOR) 80 mg tablet TAKE 1 TABLET BY MOUTH EVERY DAY, Normal           No discharge procedures on file.  ED SEPSIS DOCUMENTATION   Time reflects when diagnosis was documented in both MDM as applicable and the Disposition within this note       Time User Action Codes Description Comment    4/2/2025 12:14 PM Lilia Colón [I73.9] PAD (peripheral artery disease) ([facility])     4/2/2025 12:14 PM Lilia Colón [M79.604,  M79.605] Pain in both lower extremities                  Lilia Colón PA-C  04/02/25 1553

## 2025-04-04 DIAGNOSIS — M79.89 LEG SWELLING: ICD-10-CM

## 2025-04-04 RX ORDER — FUROSEMIDE 20 MG/1
20 TABLET ORAL DAILY
Qty: 7 TABLET | Refills: 0 | Status: SHIPPED | OUTPATIENT
Start: 2025-04-04 | End: 2025-04-10 | Stop reason: SDUPTHER

## 2025-04-04 NOTE — TELEPHONE ENCOUNTER
Medication:  furosemide (LASIX) 20 mg tablet    Dose/Frequency:   Take 1 tablet (20 mg total) by mouth daily        Quantity: 7    Pharmacy: [facility]/pharmacy #[id number] - [place] - [address]     Office:   [x] PCP/Provider -   [] Speciality/Provider -     Does the patient have enough for 3 days?   [] Yes   [x] No - Send as HP to POD

## 2025-04-10 ENCOUNTER — OFFICE VISIT (OUTPATIENT)
Dept: FAMILY MEDICINE CLINIC | Facility: CLINIC | Age: 89
End: 2025-04-10
Payer: COMMERCIAL

## 2025-04-10 VITALS
HEIGHT: 60 IN | OXYGEN SATURATION: 97 % | WEIGHT: 153.2 LBS | HEART RATE: 82 BPM | BODY MASS INDEX: 30.08 KG/M2 | TEMPERATURE: 97.5 F | DIASTOLIC BLOOD PRESSURE: 72 MMHG | RESPIRATION RATE: 18 BRPM | SYSTOLIC BLOOD PRESSURE: 138 MMHG

## 2025-04-10 DIAGNOSIS — M79.89 LEG SWELLING: ICD-10-CM

## 2025-04-10 DIAGNOSIS — K86.9 PANCREATIC LESION: ICD-10-CM

## 2025-04-10 DIAGNOSIS — I73.9 PAD (PERIPHERAL ARTERY DISEASE) (HCC): Primary | ICD-10-CM

## 2025-04-10 PROCEDURE — G2211 COMPLEX E/M VISIT ADD ON: HCPCS | Performed by: FAMILY MEDICINE

## 2025-04-10 PROCEDURE — 99214 OFFICE O/P EST MOD 30 MIN: CPT | Performed by: FAMILY MEDICINE

## 2025-04-10 RX ORDER — FUROSEMIDE 20 MG/1
20 TABLET ORAL DAILY
Qty: 30 TABLET | Refills: 0 | Status: SHIPPED | OUTPATIENT
Start: 2025-04-10

## 2025-04-10 NOTE — ASSESSMENT & PLAN NOTE
Left foot great toe pain for a while. Reviewed ER report.   Advised pt to keep vascular surgeon appt.   Keep wrap the legs.   Walking.   Continue ASA and statin.

## 2025-04-10 NOTE — PROGRESS NOTES
Name: Lachelle Salazar      : 1935      MRN: [id number]  Encounter Provider: Nimesh Napier MD  Encounter Date: 4/10/2025   Encounter department: [facility]  :  Assessment & Plan  PAD (peripheral artery disease) (HCC)  Left foot great toe pain for a while. Reviewed ER report.   Advised pt to keep vascular surgeon appt.   Keep wrap the legs.   Walking.   Continue ASA and statin.        Leg swelling  2025 BMP showed GFR 42 stable. Continue lasix 20mg QD.   Orders:    furosemide (LASIX) 20 mg tablet; Take 1 tablet (20 mg total) by mouth daily    Pancreatic lesion    Orders:    Ambulatory Referral to Gastroenterology; Future           History of Present Illness   HPI    Pt is here by herself.   Pt had left foot/leg pain for a while.   Went to ER on 2025.   CTA showed right 50% common femoral artery stenosis.   Left 50% SFA ostial stenosis. Segmental 75% distal stenosis.  75% stenosis of the P1 popliteal artery.  1.2 cm cystic-appearing distal pancreatic body lesion.  Postcontrast MRI/MRCP recommended for initial further evaluation.  Discharged home.   Pt will see vascular surgeon on 2025.         Review of Systems   Constitutional:  Negative for appetite change, chills and fever.   HENT:  Negative for congestion, ear pain, sinus pain and sore throat.    Eyes:  Negative for discharge and itching.   Respiratory:  Negative for apnea, cough, chest tightness, shortness of breath and wheezing.    Cardiovascular:  Negative for chest pain, palpitations and leg swelling.   Gastrointestinal:  Negative for abdominal pain, anal bleeding, constipation, diarrhea, nausea and vomiting.   Endocrine: Negative for cold intolerance, heat intolerance and polyuria.   Genitourinary:  Negative for difficulty urinating and dysuria.   Musculoskeletal:  Positive for joint swelling. Negative for arthralgias, back pain and myalgias.   Skin:  Negative for rash.   Neurological:  Negative for dizziness and  headaches.   Psychiatric/Behavioral:  Negative for agitation.        Objective   /72   Pulse 82   Temp 97.5 °F (36.4 °C) (Tympanic)   Resp 18   Ht 5' (1.524 m)   Wt 69.5 kg (153 lb 3.2 oz)   SpO2 97%   BMI 29.92 kg/m²      Physical Exam  Constitutional:       General: She is not in acute distress.     Appearance: She is well-developed.   HENT:      Head: Normocephalic.   Eyes:      General:         Right eye: No discharge.         Left eye: No discharge.      Conjunctiva/sclera: Conjunctivae normal.   Neck:      Thyroid: No thyromegaly.   Cardiovascular:      Rate and Rhythm: Normal rate and regular rhythm.      Heart sounds: Normal heart sounds. No murmur heard.     No friction rub. No gallop.   Pulmonary:      Effort: Pulmonary effort is normal. No respiratory distress.      Breath sounds: Normal breath sounds. No wheezing or rales.   Chest:      Chest wall: No tenderness.   Abdominal:      General: Bowel sounds are normal. There is no distension.      Palpations: Abdomen is soft. There is no mass.      Tenderness: There is no abdominal tenderness. There is no guarding or rebound.   Musculoskeletal:         General: Swelling and tenderness present. No deformity. Normal range of motion.      Cervical back: Normal range of motion.   Lymphadenopathy:      Cervical: No cervical adenopathy.   Neurological:      Mental Status: She is alert.

## 2025-04-23 DIAGNOSIS — E78.2 MIXED HYPERLIPIDEMIA: ICD-10-CM

## 2025-04-23 RX ORDER — SIMVASTATIN 80 MG
80 TABLET ORAL DAILY
Qty: 90 TABLET | Refills: 1 | Status: SHIPPED | OUTPATIENT
Start: 2025-04-23

## 2025-04-24 ENCOUNTER — TELEPHONE (OUTPATIENT)
Dept: VASCULAR SURGERY | Facility: CLINIC | Age: 89
End: 2025-04-24

## 2025-04-24 ENCOUNTER — OFFICE VISIT (OUTPATIENT)
Dept: VASCULAR SURGERY | Facility: CLINIC | Age: 89
End: 2025-04-24
Payer: COMMERCIAL

## 2025-04-24 VITALS
DIASTOLIC BLOOD PRESSURE: 70 MMHG | HEART RATE: 82 BPM | SYSTOLIC BLOOD PRESSURE: 140 MMHG | OXYGEN SATURATION: 97 % | WEIGHT: 153 LBS | BODY MASS INDEX: 30.04 KG/M2 | HEIGHT: 60 IN

## 2025-04-24 DIAGNOSIS — I73.9 PAD (PERIPHERAL ARTERY DISEASE) (HCC): Primary | ICD-10-CM

## 2025-04-24 DIAGNOSIS — I70.229 ATHEROSCLEROSIS OF ARTERY OF EXTREMITY WITH REST PAIN (HCC): ICD-10-CM

## 2025-04-24 PROCEDURE — 99214 OFFICE O/P EST MOD 30 MIN: CPT | Performed by: SURGERY

## 2025-04-24 RX ORDER — CHLORHEXIDINE GLUCONATE ORAL RINSE 1.2 MG/ML
15 SOLUTION DENTAL ONCE
OUTPATIENT
Start: 2025-04-24 | End: 2025-04-24

## 2025-04-24 NOTE — ASSESSMENT & PLAN NOTE
Patient is a pleasant 89-year-old woman who presents to the office accompanied by her daughter for left foot pain.  Patient reports an issue with an ingrown toenail as well as a recent skin biopsy performed by the podiatrist.  Patient reports that the biopsy site has not completely healed.  On exam I do not see any open wounds.  She does have thickened/hypertrophic nails.  Patient reports that she is concerned with possible fungal infection.  I have asked that she follow-up with her podiatrist regarding this.  With respect to her lower extremity vasculature she did have a CTA of the abdomen pelvis which suggest SFA/popliteal occlusive disease.  On further inquiry patient does report that she has bilateral leg cramping with ambulation as well as what appears to be rest pain in  that she needs to dangle her left foot off the edge of the bed.  She at times does report sleeping in a recliner.  On exam the left toes dorsum of foot are erythematous and suspect dependent rubor.  Given patient's complaints and CTA/lower extremity arterial duplex findings recommend left lower extremity arteriogram with possible intervention.  The procedure, risk, benefits, alternatives, and anticipated postop course were discussed in detail.  Patient was agreeable to proceed.  Written consent was obtained.    Orders:    Case request operating room: ARTERIOGRAM; Standing    Basic metabolic panel; Future    CBC and Platelet; Future    Type and screen; Future

## 2025-04-24 NOTE — TELEPHONE ENCOUNTER
REMINDER: Under Reason For Call, comments MUST be formatted as:   (Surgeon's Initials) / (Procedure)      Special Instructions/FYI/Dialysis Days: Left leg angiogram, poss. Intervention.  Please call Samra (PT's daughter) - [phone]    Clearances: NA    Consent: I certify that patient has signed, printed, timed, and dated their surgery consent.  I certify that the patient's LEGAL NAME and DATE OF BIRTH are written in the upper left corner on BOTH sides of the consent.  I certify that BOTH sides of the completed surgery consent have been scanned into the patient's Epic chart by myself on 4/24/2025.  Yes, I have LABELED the consent in Epic as Consent for Vascular Procedure.     For Surgical Clearances     Levels   1-3   ROUTE this encounter to [facility] Surgery Coordinator Pool     Level   4   ROUTE this encounter to [facility] Surgery Coordinator Pool       HYDRATION CLEARANCES   ONLY ROUTE TO  [facility] Surgery Coordinator Pool       Yes, I have ROUTED this encounter to [facility] Surgery Coordinator.

## 2025-04-24 NOTE — H&P (VIEW-ONLY)
Name: Lachelle Salazar      : 1935      MRN: [id number]  Encounter Provider: Brittney Sandra DO  Encounter Date: 2025   Encounter department: [facility] [place]  :  Assessment & Plan  PAD (peripheral artery disease) ([facility])  Patient is a pleasant 89-year-old woman who presents to the office accompanied by her daughter for left foot pain.  Patient reports an issue with an ingrown toenail as well as a recent skin biopsy performed by the podiatrist.  Patient reports that the biopsy site has not completely healed.  On exam I do not see any open wounds.  She does have thickened/hypertrophic nails.  Patient reports that she is concerned with possible fungal infection.  I have asked that she follow-up with her podiatrist regarding this.  With respect to her lower extremity vasculature she did have a CTA of the abdomen pelvis which suggest SFA/popliteal occlusive disease.  On further inquiry patient does report that she has bilateral leg cramping with ambulation as well as what appears to be rest pain in  that she needs to dangle her left foot off the edge of the bed.  She at times does report sleeping in a recliner.  On exam the left toes dorsum of foot are erythematous and suspect dependent rubor.  Given patient's complaints and CTA/lower extremity arterial duplex findings recommend left lower extremity arteriogram with possible intervention.  The procedure, risk, benefits, alternatives, and anticipated postop course were discussed in detail.  Patient was agreeable to proceed.  Written consent was obtained.    Orders:    Case request operating room: ARTERIOGRAM; Standing    Basic metabolic panel; Future    CBC and Platelet; Future    Type and screen; Future        History of Present Illness   HPI  Lachelle Salazar is a 89 y.o. female who presents to the office accompanied by her daughter for left lower extremity rest pain.    Patient present today for result review of CTA abdominal w/ Run off  04/02/2025. Patient states she gets cramping in her calf's and inner thighs on both legs while resting.Patient get pain in the left leg and toes while elevating.      History obtained from: patient and patient's child    Review of Systems   Constitutional: Negative.    HENT: Negative.     Eyes: Negative.    Respiratory: Negative.     Cardiovascular: Negative.    Gastrointestinal: Negative.    Endocrine: Negative.    Genitourinary: Negative.    Musculoskeletal: Negative.    Skin: Negative.    Allergic/Immunologic: Negative.    Neurological: Negative.    Hematological: Negative.    Psychiatric/Behavioral: Negative.       Past Medical History   Past Medical History:   Diagnosis Date    Aorto-iliac disease (HCC)     Carpal tunnel syndrome     Choledocholithiasis     resolved 3/6/2015    Diabetes mellitus (HCC) 1970’s    Diverticulosis     DM (diabetes mellitus) ([facility])     HLD (hyperlipidemia)     HTN (hypertension)     Hypertension     Inguinal hernia     PAD (peripheral artery disease) ([facility])     Sciatica     Visual impairment      Past Surgical History:   Procedure Laterality Date    ESOPHAGOGASTRODUODENOSCOPY      resolved 1/2002    HEMORRHOID SURGERY      resolved 2/1999    NEUROPLASTY / TRANSPOSITION MEDIAN NERVE AT CARPAL TUNNEL Right     last assessed 8/10/2012    RECTAL SURGERY       Family History   Problem Relation Age of Onset    Heart attack Mother         MI    Coronary artery disease Mother     Diabetes Mother         DM    Hypertension Mother     Stroke Mother         syndrome    Stroke Father         syndrome      reports that she has never smoked. She has never been exposed to tobacco smoke. She has never used smokeless tobacco. She reports that she does not drink alcohol and does not use drugs.  Current Outpatient Medications   Medication Instructions    aspirin 81 mg, Daily    brimonidine (ALPHAGAN P) 0.1 % Apply to eye    Calcium Carbonate-Vit D-Min (CALCIUM 1200) 5432-9042 MG-UNIT CHEW Chew     Cholecalciferol (VITAMIN D3) 5000 UNIT/ML LIQD Take by mouth    furosemide (LASIX) 20 mg, Oral, Daily    glucose blood (OneTouch Verio) test strip Check blood sugar 3 times per day.    insulin lispro (HUMALOG) 10 Units, Subcutaneous, 3 times daily with meals, And ISS    Insulin Pen Needle 32G X 4 MM MISC Does not apply, 4 times daily    latanoprost (XALATAN) 0.005 % ophthalmic solution Apply to eye    losartan (COZAAR) 100 mg, Oral, Daily    OneTouch Delica Lancets 33G MISC Other, 3 times daily, Check blood sugar 4 times per day    simvastatin (ZOCOR) 80 mg, Oral, Daily    Tresiba FlexTouch 30 Units, Subcutaneous, Daily   No Active Allergies   Current Outpatient Medications on File Prior to Visit   Medication Sig Dispense Refill    aspirin 81 mg chewable tablet Chew 81 mg daily      brimonidine (ALPHAGAN P) 0.1 % Apply to eye      Calcium Carbonate-Vit D-Min (CALCIUM 1200) 4298-6932 MG-UNIT CHEW Chew      Cholecalciferol (VITAMIN D3) 5000 UNIT/ML LIQD Take by mouth      furosemide (LASIX) 20 mg tablet Take 1 tablet (20 mg total) by mouth daily 30 tablet 0    glucose blood (OneTouch Verio) test strip Check blood sugar 3 times per day. 300 strip 3    insulin degludec (Tresiba FlexTouch) 100 units/mL injection pen Inject 30 Units under the skin daily 27 mL 1    insulin lispro (HumaLOG) 100 units/mL injection pen Inject 10 Units under the skin 3 (three) times a day with meals And ISS 30 mL 1    Insulin Pen Needle 32G X 4 MM MISC Use 4 (four) times a day 400 each 3    latanoprost (XALATAN) 0.005 % ophthalmic solution Apply to eye      losartan (COZAAR) 100 MG tablet TAKE 1 TABLET BY MOUTH EVERY DAY 90 tablet 1    OneTouch Delica Lancets 33G MISC by Other route 3 (three) times a day Check blood sugar 4 times per day 300 each 3    simvastatin (ZOCOR) 80 mg tablet TAKE 1 TABLET BY MOUTH EVERY DAY 90 tablet 1     No current facility-administered medications on file prior to visit.      Social History     Tobacco Use     Smoking status: Never     Passive exposure: Never    Smokeless tobacco: Never   Vaping Use    Vaping status: Never Used   Substance and Sexual Activity    Alcohol use: No    Drug use: No    Sexual activity: Never        Objective   /70 (BP Location: Left arm, Patient Position: Sitting, Cuff Size: Standard)   Pulse 82   Ht 5' (1.524 m)   Wt 69.4 kg (153 lb)   SpO2 97%   BMI 29.88 kg/m²      Physical Exam  Constitutional:       General: She is not in acute distress.     Appearance: She is well-developed.   HENT:      Head: Normocephalic and atraumatic.   Eyes:      General: No scleral icterus.     Conjunctiva/sclera: Conjunctivae normal.   Neck:      Trachea: No tracheal deviation.   Cardiovascular:      Rate and Rhythm: Normal rate and regular rhythm.      Pulses:           Femoral pulses are 1+ on the right side and 1+ on the left side.       Dorsalis pedis pulses are 0 on the right side and 0 on the left side.        Posterior tibial pulses are 0 on the right side and 0 on the left side.      Heart sounds: Normal heart sounds.   Pulmonary:      Effort: Pulmonary effort is normal.      Breath sounds: Normal breath sounds.   Abdominal:      Palpations: Abdomen is soft. Mass: no appreciable aortic pulsation/aneurysm.   Musculoskeletal:         General: Normal range of motion.      Cervical back: Normal range of motion and neck supple.      Right lower leg: Edema present.      Left lower leg: Edema present.   Skin:     General: Skin is warm and dry.   Neurological:      Mental Status: She is alert and oriented to person, place, and time.   Psychiatric:         Mood and Affect: Mood normal.         Behavior: Behavior normal.         Thought Content: Thought content normal.         Judgment: Judgment normal.     CTA abdomen/pelvis:  Narrative & Impression   CT ANGIOGRAM OF THE AORTA AND LOWER EXTREMITIES WITH IV CONTRAST     INDICATION: Left toe pain.     COMPARISON: Bilateral lower leg and aortoiliac arterial  ultrasound 3/13/2025     TECHNIQUE: CT angiogram examination of the abdomen, pelvis, and lower extremities was performed according to standard protocol with intravenous contrast. This examination, like all CT scans performed in the [facility], was performed   utilizing techniques to minimize radiation dose exposure, including the use of iterative reconstruction and automated exposure control. 3D reconstructions were performed an independent workstation, and are supplied for review. Rad dose 2257.01 mGy-cm     IV Contrast: 100 mL of iohexol  Enteric Contrast: Not administered.     FINDINGS:     VESSELS:  The visualized thoracoabdominal aorta is of normal caliber demonstrating moderate calcific atherosclerotic disease and several regions of mild soft plaque, no significant stenosis.  75% stenosis at both the celiac and SMA ostia. Major mesenteric branch vessels patent.  At least 50% stenosis of the bilateral renal artery ostia.     RIGHT:  Common femoral artery: Patent.  External iliac artery: Patent.  Common femoral artery: 50% stenosis secondary to calcific plaque.  Profunda femoral artery and branches: Patent.  Superficial femoral artery: Segmental distal stenosis of up to 75% (2:259).  Popliteal artery: 75% P1 segment stenosis (2:279)  Early takeoff of the posterior tibial artery.  The proximal anterior tibial artery is occluded with short interval reconstitution.  Patent three-vessel runoff there afterwards.     LEFT:  Common iliac artery: Calcific atherosclerotic disease without significant stenosis.  External iliac artery: Patent.  Profundofemoral artery and branches: Patent.  Superficial femoral artery: At least 50% ostial stenosis (2:151). Distal stenosis of up to 75% (2:271)  Popliteal artery: P1 segment stenosis of 75% (2:281)  Patent three-vessel runoff.     Symmetric bilateral lower leg edema.     OTHER FINDINGS     ABDOMEN     LOWER CHEST: Coronary and mitral valve calcifications. Lung  bases clear.     LIVER/BILIARY TREE:  Scattered punctate benign granulomas.  Subcentimeter hypodense lesions are too small to characterize on CT, statistically represent cysts  Several subcentimeter hyperenhancing lesions at the periphery of the hepatic dome (2:33), most likely related to peripheral vascular shunting.  In the absence of intrinsic hepatic risk factors or history of malignancy elsewhere, no specific follow-up recommended.     GALLBLADDER: Cholelithiasis without findings of acute cholecystitis.     SPLEEN: Unremarkable.     PANCREAS:  1.2 cm cystic-appearing lesion in the distal pancreatic body (2:56)     ADRENAL GLANDS: Unremarkable.     KIDNEYS/URETERS: Simple renal cyst(s). Otherwise unremarkable kidneys. No hydronephrosis.     PELVIS     REPRODUCTIVE ORGANS: Unremarkable for patient's age.     URINARY BLADDER: Unremarkable.     ADDITIONAL ABDOMINAL AND PELVIC STRUCTURES     STOMACH AND BOWEL:  Unremarkable distal sigmoid anastomosis.  Diverticulosis without evidence of active inflammation.  Stomach and small bowel unremarkable     APPENDIX: No findings to suggest appendicitis.     ABDOMINOPELVIC CAVITY: No ascites. No pneumoperitoneum. No lymphadenopathy.     ABDOMINAL WALL/INGUINAL REGIONS: Small fat-containing right inguinal hernia.     BONES: No acute fracture or suspicious osseous lesion.        IMPRESSION:     The visualized lower abdominal aorta is of normal caliber demonstrating moderate calcific atherosclerotic disease without stenosis or focal abnormality.  The bilateral common and external iliac arteries are patent without significant stenosis.     RIGHT:  50% common femoral artery stenosis  Segmental distal stenosis of the SFA up to 75%.  75% P1 popliteal artery stenosis.  The proximal anterior tibial artery is occluded with short interval reconstitution, patent three-vessel runoff afterwards.     LEFT:  50% SFA ostial stenosis. Segmental 75% distal stenosis.  75% stenosis of the P1  popliteal artery.  Patent three-vessel runoff.     Symmetric bilateral lower leg edema.     1.2 cm cystic-appearing distal pancreatic body lesion.  Postcontrast MRI/MRCP recommended for initial further evaluation.     The study was marked in EPIC for immediate notification     Lower extremity arterial duplex:  CONCLUSION:     Impression:  RIGHT LOWER LIMB:  There is evidence of 50-75% stenosis in the common femoral artery, proximal  superficial femoral artery, and proximal popliteal artery.  Ankle/Brachial index: Unreliable due to non-compressible vessels  (No prior)  PVR at the ankle are dampened. PPG tracings at the great toe are absent.  Metatarsal pressure and Great toe pressure were not obtained secondary to  absent/ diminished PPG tracing  Pedal Acceleration time: 160 ms which is in the mild ischemic range.     LEFT LOWER LIMB:  Monophasic waveforms are noted in the external iliac artery and the common  femoral artery which suggest proximal disease correlating with findings of a  recent abdominal aorta iliac study  Ankle/Brachial index: Unreliable due to non-compressible vessels  (No prior)  PVR/ PPG tracings at the great toe are absent.  Metatarsal pressure and Great toe pressure were not obtained secondary to  absent/ diminished PPG tracing  Pedal Acceleration time: 240 ms which is in the severe ischemic range.       Administrative Statements   I have spent a total time of 30 minutes in caring for this patient on the day of the visit/encounter including Diagnostic results, Prognosis, Risks and benefits of tx options, Instructions for management, Patient and family education, Importance of tx compliance, Risk factor reductions, Impressions, Counseling / Coordination of care, Documenting in the medical record, Reviewing/placing orders in the medical record (including tests, medications, and/or procedures), and Obtaining or reviewing history  .

## 2025-04-24 NOTE — PROGRESS NOTES
Name: Lachelle Salazar      : 1935      MRN: [id number]  Encounter Provider: Brittney Sandra DO  Encounter Date: 2025   Encounter department: [facility]  :  Assessment & Plan  PAD (peripheral artery disease) (formerly [facility])  Patient is a pleasant 89-year-old woman who presents to the office accompanied by her daughter for left foot pain.  Patient reports an issue with an ingrown toenail as well as a recent skin biopsy performed by the podiatrist.  Patient reports that the biopsy site has not completely healed.  On exam I do not see any open wounds.  She does have thickened/hypertrophic nails.  Patient reports that she is concerned with possible fungal infection.  I have asked that she follow-up with her podiatrist regarding this.  With respect to her lower extremity vasculature she did have a CTA of the abdomen pelvis which suggest SFA/popliteal occlusive disease.  On further inquiry patient does report that she has bilateral leg cramping with ambulation as well as what appears to be rest pain in  that she needs to dangle her left foot off the edge of the bed.  She at times does report sleeping in a recliner.  On exam the left toes dorsum of foot are erythematous and suspect dependent rubor.  Given patient's complaints and CTA/lower extremity arterial duplex findings recommend left lower extremity arteriogram with possible intervention.  The procedure, risk, benefits, alternatives, and anticipated postop course were discussed in detail.  Patient was agreeable to proceed.  Written consent was obtained.    Orders:    Case request operating room: ARTERIOGRAM; Standing    Basic metabolic panel; Future    CBC and Platelet; Future    Type and screen; Future        History of Present Illness   HPI  Lachelle Salazar is a 89 y.o. female who presents to the office accompanied by her daughter for left lower extremity rest pain.    Patient present today for result review of CTA abdominal w/ Run off  04/02/2025. Patient states she gets cramping in her calf's and inner thighs on both legs while resting.Patient get pain in the left leg and toes while elevating.      History obtained from: patient and patient's child    Review of Systems   Constitutional: Negative.    HENT: Negative.     Eyes: Negative.    Respiratory: Negative.     Cardiovascular: Negative.    Gastrointestinal: Negative.    Endocrine: Negative.    Genitourinary: Negative.    Musculoskeletal: Negative.    Skin: Negative.    Allergic/Immunologic: Negative.    Neurological: Negative.    Hematological: Negative.    Psychiatric/Behavioral: Negative.       Past Medical History   Past Medical History:   Diagnosis Date    Aorto-iliac disease (HCC)     Carpal tunnel syndrome     Choledocholithiasis     resolved 3/6/2015    Diabetes mellitus (HCC) 1970’s    Diverticulosis     DM (diabetes mellitus) ([facility])     HLD (hyperlipidemia)     HTN (hypertension)     Hypertension     Inguinal hernia     PAD (peripheral artery disease) ([facility])     Sciatica     Visual impairment      Past Surgical History:   Procedure Laterality Date    ESOPHAGOGASTRODUODENOSCOPY      resolved 1/2002    HEMORRHOID SURGERY      resolved 2/1999    NEUROPLASTY / TRANSPOSITION MEDIAN NERVE AT CARPAL TUNNEL Right     last assessed 8/10/2012    RECTAL SURGERY       Family History   Problem Relation Age of Onset    Heart attack Mother         MI    Coronary artery disease Mother     Diabetes Mother         DM    Hypertension Mother     Stroke Mother         syndrome    Stroke Father         syndrome      reports that she has never smoked. She has never been exposed to tobacco smoke. She has never used smokeless tobacco. She reports that she does not drink alcohol and does not use drugs.  Current Outpatient Medications   Medication Instructions    aspirin 81 mg, Daily    brimonidine (ALPHAGAN P) 0.1 % Apply to eye    Calcium Carbonate-Vit D-Min (CALCIUM 1200) 2088-1640 MG-UNIT CHEW Chew     Cholecalciferol (VITAMIN D3) 5000 UNIT/ML LIQD Take by mouth    furosemide (LASIX) 20 mg, Oral, Daily    glucose blood (OneTouch Verio) test strip Check blood sugar 3 times per day.    insulin lispro (HUMALOG) 10 Units, Subcutaneous, 3 times daily with meals, And ISS    Insulin Pen Needle 32G X 4 MM MISC Does not apply, 4 times daily    latanoprost (XALATAN) 0.005 % ophthalmic solution Apply to eye    losartan (COZAAR) 100 mg, Oral, Daily    OneTouch Delica Lancets 33G MISC Other, 3 times daily, Check blood sugar 4 times per day    simvastatin (ZOCOR) 80 mg, Oral, Daily    Tresiba FlexTouch 30 Units, Subcutaneous, Daily   No Active Allergies   Current Outpatient Medications on File Prior to Visit   Medication Sig Dispense Refill    aspirin 81 mg chewable tablet Chew 81 mg daily      brimonidine (ALPHAGAN P) 0.1 % Apply to eye      Calcium Carbonate-Vit D-Min (CALCIUM 1200) 3236-4972 MG-UNIT CHEW Chew      Cholecalciferol (VITAMIN D3) 5000 UNIT/ML LIQD Take by mouth      furosemide (LASIX) 20 mg tablet Take 1 tablet (20 mg total) by mouth daily 30 tablet 0    glucose blood (OneTouch Verio) test strip Check blood sugar 3 times per day. 300 strip 3    insulin degludec (Tresiba FlexTouch) 100 units/mL injection pen Inject 30 Units under the skin daily 27 mL 1    insulin lispro (HumaLOG) 100 units/mL injection pen Inject 10 Units under the skin 3 (three) times a day with meals And ISS 30 mL 1    Insulin Pen Needle 32G X 4 MM MISC Use 4 (four) times a day 400 each 3    latanoprost (XALATAN) 0.005 % ophthalmic solution Apply to eye      losartan (COZAAR) 100 MG tablet TAKE 1 TABLET BY MOUTH EVERY DAY 90 tablet 1    OneTouch Delica Lancets 33G MISC by Other route 3 (three) times a day Check blood sugar 4 times per day 300 each 3    simvastatin (ZOCOR) 80 mg tablet TAKE 1 TABLET BY MOUTH EVERY DAY 90 tablet 1     No current facility-administered medications on file prior to visit.      Social History     Tobacco Use     Smoking status: Never     Passive exposure: Never    Smokeless tobacco: Never   Vaping Use    Vaping status: Never Used   Substance and Sexual Activity    Alcohol use: No    Drug use: No    Sexual activity: Never        Objective   /70 (BP Location: Left arm, Patient Position: Sitting, Cuff Size: Standard)   Pulse 82   Ht 5' (1.524 m)   Wt 69.4 kg (153 lb)   SpO2 97%   BMI 29.88 kg/m²      Physical Exam  Constitutional:       General: She is not in acute distress.     Appearance: She is well-developed.   HENT:      Head: Normocephalic and atraumatic.   Eyes:      General: No scleral icterus.     Conjunctiva/sclera: Conjunctivae normal.   Neck:      Trachea: No tracheal deviation.   Cardiovascular:      Rate and Rhythm: Normal rate and regular rhythm.      Pulses:           Femoral pulses are 1+ on the right side and 1+ on the left side.       Dorsalis pedis pulses are 0 on the right side and 0 on the left side.        Posterior tibial pulses are 0 on the right side and 0 on the left side.      Heart sounds: Normal heart sounds.   Pulmonary:      Effort: Pulmonary effort is normal.      Breath sounds: Normal breath sounds.   Abdominal:      Palpations: Abdomen is soft. Mass: no appreciable aortic pulsation/aneurysm.   Musculoskeletal:         General: Normal range of motion.      Cervical back: Normal range of motion and neck supple.      Right lower leg: Edema present.      Left lower leg: Edema present.   Skin:     General: Skin is warm and dry.   Neurological:      Mental Status: She is alert and oriented to person, place, and time.   Psychiatric:         Mood and Affect: Mood normal.         Behavior: Behavior normal.         Thought Content: Thought content normal.         Judgment: Judgment normal.     CTA abdomen/pelvis:  Narrative & Impression   CT ANGIOGRAM OF THE AORTA AND LOWER EXTREMITIES WITH IV CONTRAST     INDICATION: Left toe pain.     COMPARISON: Bilateral lower leg and aortoiliac arterial  ultrasound 3/13/2025     TECHNIQUE: CT angiogram examination of the abdomen, pelvis, and lower extremities was performed according to standard protocol with intravenous contrast. This examination, like all CT scans performed in the [facility], was performed   utilizing techniques to minimize radiation dose exposure, including the use of iterative reconstruction and automated exposure control. 3D reconstructions were performed an independent workstation, and are supplied for review. Rad dose 2257.01 mGy-cm     IV Contrast: 100 mL of iohexol  Enteric Contrast: Not administered.     FINDINGS:     VESSELS:  The visualized thoracoabdominal aorta is of normal caliber demonstrating moderate calcific atherosclerotic disease and several regions of mild soft plaque, no significant stenosis.  75% stenosis at both the celiac and SMA ostia. Major mesenteric branch vessels patent.  At least 50% stenosis of the bilateral renal artery ostia.     RIGHT:  Common femoral artery: Patent.  External iliac artery: Patent.  Common femoral artery: 50% stenosis secondary to calcific plaque.  Profunda femoral artery and branches: Patent.  Superficial femoral artery: Segmental distal stenosis of up to 75% (2:259).  Popliteal artery: 75% P1 segment stenosis (2:279)  Early takeoff of the posterior tibial artery.  The proximal anterior tibial artery is occluded with short interval reconstitution.  Patent three-vessel runoff there afterwards.     LEFT:  Common iliac artery: Calcific atherosclerotic disease without significant stenosis.  External iliac artery: Patent.  Profundofemoral artery and branches: Patent.  Superficial femoral artery: At least 50% ostial stenosis (2:151). Distal stenosis of up to 75% (2:271)  Popliteal artery: P1 segment stenosis of 75% (2:281)  Patent three-vessel runoff.     Symmetric bilateral lower leg edema.     OTHER FINDINGS     ABDOMEN     LOWER CHEST: Coronary and mitral valve calcifications. Lung  bases clear.     LIVER/BILIARY TREE:  Scattered punctate benign granulomas.  Subcentimeter hypodense lesions are too small to characterize on CT, statistically represent cysts  Several subcentimeter hyperenhancing lesions at the periphery of the hepatic dome (2:33), most likely related to peripheral vascular shunting.  In the absence of intrinsic hepatic risk factors or history of malignancy elsewhere, no specific follow-up recommended.     GALLBLADDER: Cholelithiasis without findings of acute cholecystitis.     SPLEEN: Unremarkable.     PANCREAS:  1.2 cm cystic-appearing lesion in the distal pancreatic body (2:56)     ADRENAL GLANDS: Unremarkable.     KIDNEYS/URETERS: Simple renal cyst(s). Otherwise unremarkable kidneys. No hydronephrosis.     PELVIS     REPRODUCTIVE ORGANS: Unremarkable for patient's age.     URINARY BLADDER: Unremarkable.     ADDITIONAL ABDOMINAL AND PELVIC STRUCTURES     STOMACH AND BOWEL:  Unremarkable distal sigmoid anastomosis.  Diverticulosis without evidence of active inflammation.  Stomach and small bowel unremarkable     APPENDIX: No findings to suggest appendicitis.     ABDOMINOPELVIC CAVITY: No ascites. No pneumoperitoneum. No lymphadenopathy.     ABDOMINAL WALL/INGUINAL REGIONS: Small fat-containing right inguinal hernia.     BONES: No acute fracture or suspicious osseous lesion.        IMPRESSION:     The visualized lower abdominal aorta is of normal caliber demonstrating moderate calcific atherosclerotic disease without stenosis or focal abnormality.  The bilateral common and external iliac arteries are patent without significant stenosis.     RIGHT:  50% common femoral artery stenosis  Segmental distal stenosis of the SFA up to 75%.  75% P1 popliteal artery stenosis.  The proximal anterior tibial artery is occluded with short interval reconstitution, patent three-vessel runoff afterwards.     LEFT:  50% SFA ostial stenosis. Segmental 75% distal stenosis.  75% stenosis of the P1  popliteal artery.  Patent three-vessel runoff.     Symmetric bilateral lower leg edema.     1.2 cm cystic-appearing distal pancreatic body lesion.  Postcontrast MRI/MRCP recommended for initial further evaluation.     The study was marked in EPIC for immediate notification     Lower extremity arterial duplex:  CONCLUSION:     Impression:  RIGHT LOWER LIMB:  There is evidence of 50-75% stenosis in the common femoral artery, proximal  superficial femoral artery, and proximal popliteal artery.  Ankle/Brachial index: Unreliable due to non-compressible vessels  (No prior)  PVR at the ankle are dampened. PPG tracings at the great toe are absent.  Metatarsal pressure and Great toe pressure were not obtained secondary to  absent/ diminished PPG tracing  Pedal Acceleration time: 160 ms which is in the mild ischemic range.     LEFT LOWER LIMB:  Monophasic waveforms are noted in the external iliac artery and the common  femoral artery which suggest proximal disease correlating with findings of a  recent abdominal aorta iliac study  Ankle/Brachial index: Unreliable due to non-compressible vessels  (No prior)  PVR/ PPG tracings at the great toe are absent.  Metatarsal pressure and Great toe pressure were not obtained secondary to  absent/ diminished PPG tracing  Pedal Acceleration time: 240 ms which is in the severe ischemic range.       Administrative Statements   I have spent a total time of 30 minutes in caring for this patient on the day of the visit/encounter including Diagnostic results, Prognosis, Risks and benefits of tx options, Instructions for management, Patient and family education, Importance of tx compliance, Risk factor reductions, Impressions, Counseling / Coordination of care, Documenting in the medical record, Reviewing/placing orders in the medical record (including tests, medications, and/or procedures), and Obtaining or reviewing history  .

## 2025-04-25 DIAGNOSIS — E11.22 TYPE 2 DIABETES MELLITUS WITH STAGE 3B CHRONIC KIDNEY DISEASE, WITH LONG-TERM CURRENT USE OF INSULIN (HCC): ICD-10-CM

## 2025-04-25 DIAGNOSIS — Z79.4 TYPE 2 DIABETES MELLITUS WITH STAGE 3B CHRONIC KIDNEY DISEASE, WITH LONG-TERM CURRENT USE OF INSULIN (HCC): ICD-10-CM

## 2025-04-25 DIAGNOSIS — N18.32 TYPE 2 DIABETES MELLITUS WITH STAGE 3B CHRONIC KIDNEY DISEASE, WITH LONG-TERM CURRENT USE OF INSULIN (HCC): ICD-10-CM

## 2025-04-25 RX ORDER — INSULIN LISPRO 100 [IU]/ML
10 INJECTION, SOLUTION INTRAVENOUS; SUBCUTANEOUS
Qty: 30 ML | Refills: 1 | Status: SHIPPED | OUTPATIENT
Start: 2025-04-25

## 2025-04-25 NOTE — TELEPHONE ENCOUNTER
Reason for call:   [x] Refill   [] Prior Auth  [] Other:     Office:   [x] PCP/Provider - Nimesh Napier MD  [] Specialty/Provider -     Medication: insulin lispro (HumaLOG) 100 units/mL injection pen     Dose/Frequency: Inject 10 Units under the skin 3 (three) times a day with meals And ISS,    Quantity: 30 mL    Pharmacy: [facility]/pharmacy #[id number] - [place], [place] - [address]     Local Pharmacy   Does the patient have enough for 3 days?   [x] Yes   [] No - Send as HP to POD    Mail Away Pharmacy   Does the patient have enough for 10 days?   [] Yes   [] No - Send as HP to POD

## 2025-04-29 ENCOUNTER — PREP FOR PROCEDURE (OUTPATIENT)
Dept: VASCULAR SURGERY | Facility: CLINIC | Age: 89
End: 2025-04-29

## 2025-04-29 ENCOUNTER — APPOINTMENT (OUTPATIENT)
Dept: LAB | Facility: CLINIC | Age: 89
End: 2025-04-29
Payer: COMMERCIAL

## 2025-04-29 ENCOUNTER — ANESTHESIA EVENT (OUTPATIENT)
Dept: PERIOP | Facility: HOSPITAL | Age: 89
End: 2025-04-29
Payer: COMMERCIAL

## 2025-04-29 DIAGNOSIS — I73.9 PAD (PERIPHERAL ARTERY DISEASE) (HCC): ICD-10-CM

## 2025-04-29 DIAGNOSIS — I70.229 ATHEROSCLEROSIS OF ARTERY OF EXTREMITY WITH REST PAIN (HCC): ICD-10-CM

## 2025-04-29 LAB
ANION GAP SERPL CALCULATED.3IONS-SCNC: 11 MMOL/L (ref 4–13)
BUN SERPL-MCNC: 35 MG/DL (ref 5–25)
CALCIUM SERPL-MCNC: 9.7 MG/DL (ref 8.4–10.2)
CHLORIDE SERPL-SCNC: 103 MMOL/L (ref 96–108)
CO2 SERPL-SCNC: 29 MMOL/L (ref 21–32)
CREAT SERPL-MCNC: 1.3 MG/DL (ref 0.6–1.3)
ERYTHROCYTE [DISTWIDTH] IN BLOOD BY AUTOMATED COUNT: 12.4 % (ref 11.6–15.1)
GFR SERPL CREATININE-BSD FRML MDRD: 36 ML/MIN/1.73SQ M
GLUCOSE P FAST SERPL-MCNC: 99 MG/DL (ref 65–99)
HCT VFR BLD AUTO: 45.4 % (ref 34.8–46.1)
HGB BLD-MCNC: 13.9 G/DL (ref 11.5–15.4)
MCH RBC QN AUTO: 29.8 PG (ref 26.8–34.3)
MCHC RBC AUTO-ENTMCNC: 30.6 G/DL (ref 31.4–37.4)
MCV RBC AUTO: 97 FL (ref 82–98)
PLATELET # BLD AUTO: 242 THOUSANDS/UL (ref 149–390)
PMV BLD AUTO: 10.8 FL (ref 8.9–12.7)
POTASSIUM SERPL-SCNC: 4.9 MMOL/L (ref 3.5–5.3)
RBC # BLD AUTO: 4.67 MILLION/UL (ref 3.81–5.12)
SODIUM SERPL-SCNC: 143 MMOL/L (ref 135–147)
WBC # BLD AUTO: 8.33 THOUSAND/UL (ref 4.31–10.16)

## 2025-04-29 PROCEDURE — 86850 RBC ANTIBODY SCREEN: CPT | Performed by: SURGERY

## 2025-04-29 PROCEDURE — 86901 BLOOD TYPING SEROLOGIC RH(D): CPT | Performed by: SURGERY

## 2025-04-29 PROCEDURE — 86900 BLOOD TYPING SEROLOGIC ABO: CPT | Performed by: SURGERY

## 2025-04-29 PROCEDURE — 80048 BASIC METABOLIC PNL TOTAL CA: CPT

## 2025-04-29 PROCEDURE — 85027 COMPLETE CBC AUTOMATED: CPT

## 2025-04-29 PROCEDURE — 36415 COLL VENOUS BLD VENIPUNCTURE: CPT

## 2025-04-29 RX ORDER — SENNOSIDES 8.6 MG
650 CAPSULE ORAL EVERY 8 HOURS PRN
COMMUNITY

## 2025-04-29 NOTE — PRE-PROCEDURE INSTRUCTIONS
Pre-Surgery Instructions:   Medication Instructions    acetaminophen (TYLENOL) 650 mg CR tablet Uses PRN- OK to take day of surgery    aspirin 81 mg chewable tablet Take day of surgery.    brimonidine (ALPHAGAN P) 0.1 % Take day of surgery.    Calcium Carbonate-Vit D-Min (CALCIUM 1200) 2777-3687 MG-UNIT CHEW Stop taking 7 days prior to surgery.    Cholecalciferol (VITAMIN D3) 5000 UNIT/ML LIQD Stop taking 7 days prior to surgery.    insulin degludec (Tresiba FlexTouch) 100 units/mL injection pen Take day of surgery.    insulin lispro (HumaLOG) 100 units/mL injection pen Hold day of surgery.    latanoprost (XALATAN) 0.005 % ophthalmic solution Take night before surgery    losartan (COZAAR) 100 MG tablet Hold day of surgery.    simvastatin (ZOCOR) 80 mg tablet Take night before surgery    furosemide (LASIX) 20 mg tablet Uses PRN- DO NOT take day of surgery     Medication instructions for day of surgery reviewed. Please take all instructed medications with only a sip of water.       You will receive a call one business day prior to surgery with an arrival time and hospital directions. If your surgery is scheduled on a Monday, the hospital will be calling you on the Friday prior to your surgery. If you have not heard from anyone by 8pm, please call the hospital supervisor through the hospital  at [phone]. ([place] [phone] or [place] [phone]).    Do not eat or drink anything after midnight the night before your surgery, including candy, mints, lifesavers, or chewing gum. Do not drink alcohol 24hrs before your surgery. Try not to smoke at least 24hrs before your surgery.       Follow the pre surgery showering instructions as listed in the “My Surgical Experience Booklet” or otherwise provided by your surgeon's office. Do not use a blade to shave the surgical area 1 week before surgery. It is okay to use a clean electric clippers up to 24 hours before surgery. Do not apply any lotions, creams,  including makeup, cologne, deodorant, or perfumes after showering on the day of your surgery. Do not use dry shampoo, hair spray, hair gel, or any type of hair products.     No contact lenses, eye make-up, or artificial eyelashes. Remove nail polish, including gel polish, and any artificial, gel, or acrylic nails if possible. Remove all jewelry including rings and body piercing jewelry.     Wear causal clothing that is easy to take on and off. Consider your type of surgery.    Keep any valuables, jewelry, piercings at home. Please bring any specially ordered equipment (sling, braces) if indicated.    Arrange for a responsible person to drive you to and from the hospital on the day of your surgery. Please confirm the visitor policy for the day of your procedure when you receive your phone call with an arrival time.     Call the surgeon's office with any new illnesses, exposures, or additional questions prior to surgery.    Please reference your “My Surgical Experience Booklet” for additional information to prepare for your upcoming surgery.

## 2025-04-29 NOTE — TELEPHONE ENCOUNTER
Verified patient's insurance   CONFIRMED - Patient's insurance is [employer] of [employer]  Is patient requesting a call when authorization has been obtained? Patient did not request a call.    Surgery Date: 5-8-25  Primary Surgeon: JAYME Dee/ Brittney Newman (NPI: [id number])  Assisting Surgeon: Not Applicable (N/A)  Facility: [facility] (Tax: [id number] / NPI: [id number])  Inpatient / Outpatient: Outpatient  Level: 3    Clearance Received: No clearance ordered.  Consent Received: Yes, scanned into Epic on 4-24-25.  Medication Hold / Last Dose:  No hold on ASA  IR Notified:  Yes  Rep. Notified: Not Applicable (N/A)  Equipment Needs: Not Applicable (N/A)  Vas Lab Requested: Not Applicable (N/A)  Patient Contacted: 4-28-25    Lyft Ride: NO  Pickup Date/Time: Not Applicable (N/A)    Diagnosis: I73.9  Procedure/ CPT Code(s): Angiogram // CPT: 59283, 20057, and 95444  // Procedure to take place in OR [Auth/ Cert Based]    For varicose vein related procedures:   Last LEVDR: Not Applicable (N/A)  CEAP Classification: Not Applicable (N/A)  VCSS: Not Applicable (N/A)  PICTURES: Not Applicable (N/A)    Post Operative Date/ Time: TBD ,      *Please review medication hold(s), PATs, and check H&P with patient.*  PATIENT WAS MAILED SURGERY/SHOWERING/DISCHARGE/COVID INSTRUCTIONS AFTER REVIEWING WITH THEM VIA PHONE CALL.

## 2025-04-30 ENCOUNTER — LAB REQUISITION (OUTPATIENT)
Dept: LAB | Facility: HOSPITAL | Age: 89
End: 2025-04-30
Payer: COMMERCIAL

## 2025-04-30 DIAGNOSIS — I70.229: ICD-10-CM

## 2025-04-30 DIAGNOSIS — I73.9 PERIPHERAL VASCULAR DISEASE, UNSPECIFIED (HCC): ICD-10-CM

## 2025-04-30 LAB
ABO GROUP BLD: NORMAL
BLD GP AB SCN SERPL QL: NEGATIVE
RH BLD: POSITIVE
SPECIMEN EXPIRATION DATE: NORMAL

## 2025-05-08 ENCOUNTER — HOSPITAL ENCOUNTER (OUTPATIENT)
Facility: HOSPITAL | Age: 89
Setting detail: OUTPATIENT SURGERY
Discharge: HOME | End: 2025-05-08
Attending: SURGERY | Admitting: SURGERY
Payer: COMMERCIAL

## 2025-05-08 ENCOUNTER — APPOINTMENT (OUTPATIENT)
Dept: RADIOLOGY | Facility: HOSPITAL | Age: 89
End: 2025-05-08
Payer: COMMERCIAL

## 2025-05-08 ENCOUNTER — ANESTHESIA (OUTPATIENT)
Dept: PERIOP | Facility: HOSPITAL | Age: 89
End: 2025-05-08
Payer: COMMERCIAL

## 2025-05-08 VITALS
BODY MASS INDEX: 30.08 KG/M2 | HEIGHT: 60 IN | TEMPERATURE: 97.3 F | SYSTOLIC BLOOD PRESSURE: 176 MMHG | OXYGEN SATURATION: 94 % | DIASTOLIC BLOOD PRESSURE: 74 MMHG | HEART RATE: 102 BPM | WEIGHT: 153.22 LBS | RESPIRATION RATE: 18 BRPM

## 2025-05-08 DIAGNOSIS — I73.9 PAD (PERIPHERAL ARTERY DISEASE) (HCC): ICD-10-CM

## 2025-05-08 LAB
ATRIAL RATE: 80 BPM
GLUCOSE SERPL-MCNC: 111 MG/DL (ref 65–140)
GLUCOSE SERPL-MCNC: 122 MG/DL (ref 65–140)
P AXIS: 67 DEGREES
PR INTERVAL: 134 MS
QRS AXIS: -26 DEGREES
QRSD INTERVAL: 102 MS
QT INTERVAL: 410 MS
QTC INTERVAL: 473 MS
T WAVE AXIS: 50 DEGREES
VENTRICULAR RATE: 80 BPM

## 2025-05-08 PROCEDURE — 77001 FLUOROGUIDE FOR VEIN DEVICE: CPT

## 2025-05-08 PROCEDURE — C1887 CATHETER, GUIDING: HCPCS | Performed by: SURGERY

## 2025-05-08 PROCEDURE — C1894 INTRO/SHEATH, NON-LASER: HCPCS | Performed by: SURGERY

## 2025-05-08 PROCEDURE — C1769 GUIDE WIRE: HCPCS | Performed by: SURGERY

## 2025-05-08 PROCEDURE — 82948 REAGENT STRIP/BLOOD GLUCOSE: CPT

## 2025-05-08 PROCEDURE — 76937 US GUIDE VASCULAR ACCESS: CPT

## 2025-05-08 PROCEDURE — 75710 ARTERY X-RAYS ARM/LEG: CPT

## 2025-05-08 PROCEDURE — C1760 CLOSURE DEV, VASC: HCPCS | Performed by: SURGERY

## 2025-05-08 PROCEDURE — C1725 CATH, TRANSLUMIN NON-LASER: HCPCS | Performed by: SURGERY

## 2025-05-08 PROCEDURE — 93010 ELECTROCARDIOGRAM REPORT: CPT | Performed by: INTERNAL MEDICINE

## 2025-05-08 PROCEDURE — 93005 ELECTROCARDIOGRAM TRACING: CPT

## 2025-05-08 PROCEDURE — C2623 CATH, TRANSLUMIN, DRUG-COAT: HCPCS | Performed by: SURGERY

## 2025-05-08 PROCEDURE — 75630 X-RAY AORTA LEG ARTERIES: CPT

## 2025-05-08 PROCEDURE — 37224 PR REVSC OPN/PRG FEM/POP W/ANGIOPLASTY UNI: CPT | Performed by: SURGERY

## 2025-05-08 DEVICE — MYNX CONTROL VCD 6F 7F
Type: IMPLANTABLE DEVICE | Site: GROIN | Status: FUNCTIONAL
Brand: MYNX CONTROL

## 2025-05-08 RX ORDER — CHLORHEXIDINE GLUCONATE ORAL RINSE 1.2 MG/ML
15 SOLUTION DENTAL ONCE
Status: COMPLETED | OUTPATIENT
Start: 2025-05-08 | End: 2025-05-08

## 2025-05-08 RX ORDER — HEPARIN SODIUM 200 [USP'U]/100ML
INJECTION, SOLUTION INTRAVENOUS
Status: COMPLETED | OUTPATIENT
Start: 2025-05-08 | End: 2025-05-08

## 2025-05-08 RX ORDER — SODIUM CHLORIDE, SODIUM LACTATE, POTASSIUM CHLORIDE, CALCIUM CHLORIDE 600; 310; 30; 20 MG/100ML; MG/100ML; MG/100ML; MG/100ML
20 INJECTION, SOLUTION INTRAVENOUS CONTINUOUS
Status: DISCONTINUED | OUTPATIENT
Start: 2025-05-08 | End: 2025-05-08

## 2025-05-08 RX ORDER — HEPARIN SODIUM 1000 [USP'U]/ML
INJECTION, SOLUTION INTRAVENOUS; SUBCUTANEOUS AS NEEDED
Status: DISCONTINUED | OUTPATIENT
Start: 2025-05-08 | End: 2025-05-08

## 2025-05-08 RX ORDER — ONDANSETRON 2 MG/ML
INJECTION INTRAMUSCULAR; INTRAVENOUS AS NEEDED
Status: DISCONTINUED | OUTPATIENT
Start: 2025-05-08 | End: 2025-05-08

## 2025-05-08 RX ORDER — FENTANYL CITRATE 50 UG/ML
INJECTION, SOLUTION INTRAMUSCULAR; INTRAVENOUS AS NEEDED
Status: DISCONTINUED | OUTPATIENT
Start: 2025-05-08 | End: 2025-05-08

## 2025-05-08 RX ORDER — DEXAMETHASONE SODIUM PHOSPHATE 10 MG/ML
INJECTION, SOLUTION INTRAMUSCULAR; INTRAVENOUS AS NEEDED
Status: DISCONTINUED | OUTPATIENT
Start: 2025-05-08 | End: 2025-05-08

## 2025-05-08 RX ORDER — LIDOCAINE HYDROCHLORIDE 20 MG/ML
INJECTION, SOLUTION EPIDURAL; INFILTRATION; INTRACAUDAL; PERINEURAL AS NEEDED
Status: DISCONTINUED | OUTPATIENT
Start: 2025-05-08 | End: 2025-05-08

## 2025-05-08 RX ORDER — FENTANYL CITRATE/PF 50 MCG/ML
25 SYRINGE (ML) INJECTION
Status: DISCONTINUED | OUTPATIENT
Start: 2025-05-08 | End: 2025-05-08 | Stop reason: HOSPADM

## 2025-05-08 RX ORDER — CLOPIDOGREL BISULFATE 75 MG/1
75 TABLET ORAL DAILY
Qty: 30 TABLET | Refills: 0 | Status: SHIPPED | OUTPATIENT
Start: 2025-05-09

## 2025-05-08 RX ORDER — ONDANSETRON 2 MG/ML
4 INJECTION INTRAMUSCULAR; INTRAVENOUS ONCE AS NEEDED
Status: DISCONTINUED | OUTPATIENT
Start: 2025-05-08 | End: 2025-05-08 | Stop reason: HOSPADM

## 2025-05-08 RX ORDER — ACETAMINOPHEN 325 MG/1
650 TABLET ORAL EVERY 4 HOURS PRN
Status: DISCONTINUED | OUTPATIENT
Start: 2025-05-08 | End: 2025-05-08 | Stop reason: HOSPADM

## 2025-05-08 RX ORDER — SODIUM CHLORIDE, SODIUM LACTATE, POTASSIUM CHLORIDE, CALCIUM CHLORIDE 600; 310; 30; 20 MG/100ML; MG/100ML; MG/100ML; MG/100ML
125 INJECTION, SOLUTION INTRAVENOUS CONTINUOUS
Status: DISCONTINUED | OUTPATIENT
Start: 2025-05-08 | End: 2025-05-08

## 2025-05-08 RX ORDER — PROPOFOL 10 MG/ML
INJECTION, EMULSION INTRAVENOUS AS NEEDED
Status: DISCONTINUED | OUTPATIENT
Start: 2025-05-08 | End: 2025-05-08

## 2025-05-08 RX ORDER — CLOPIDOGREL BISULFATE 75 MG/1
300 TABLET ORAL ONCE
Status: COMPLETED | OUTPATIENT
Start: 2025-05-08 | End: 2025-05-08

## 2025-05-08 RX ORDER — ACETAMINOPHEN 325 MG/1
975 TABLET ORAL ONCE
Status: COMPLETED | OUTPATIENT
Start: 2025-05-08 | End: 2025-05-08

## 2025-05-08 RX ORDER — IODIXANOL 320 MG/ML
INJECTION, SOLUTION INTRAVASCULAR AS NEEDED
Status: DISCONTINUED | OUTPATIENT
Start: 2025-05-08 | End: 2025-05-08 | Stop reason: HOSPADM

## 2025-05-08 RX ORDER — LIDOCAINE HYDROCHLORIDE 10 MG/ML
INJECTION, SOLUTION EPIDURAL; INFILTRATION; INTRACAUDAL; PERINEURAL AS NEEDED
Status: DISCONTINUED | OUTPATIENT
Start: 2025-05-08 | End: 2025-05-08 | Stop reason: HOSPADM

## 2025-05-08 RX ADMIN — FENTANYL CITRATE 25 MCG: 50 INJECTION INTRAMUSCULAR; INTRAVENOUS at 08:44

## 2025-05-08 RX ADMIN — FENTANYL CITRATE 25 MCG: 50 INJECTION INTRAMUSCULAR; INTRAVENOUS at 09:15

## 2025-05-08 RX ADMIN — SODIUM CHLORIDE, SODIUM LACTATE, POTASSIUM CHLORIDE, AND CALCIUM CHLORIDE 125 ML/HR: .6; .31; .03; .02 INJECTION, SOLUTION INTRAVENOUS at 08:04

## 2025-05-08 RX ADMIN — DEXAMETHASONE SODIUM PHOSPHATE 8 MG: 10 INJECTION, SOLUTION INTRAMUSCULAR; INTRAVENOUS at 10:10

## 2025-05-08 RX ADMIN — FENTANYL CITRATE 25 MCG: 50 INJECTION INTRAMUSCULAR; INTRAVENOUS at 09:35

## 2025-05-08 RX ADMIN — FENTANYL CITRATE 25 MCG: 50 INJECTION INTRAMUSCULAR; INTRAVENOUS at 10:20

## 2025-05-08 RX ADMIN — FENTANYL CITRATE 25 MCG: 50 INJECTION INTRAMUSCULAR; INTRAVENOUS at 10:11

## 2025-05-08 RX ADMIN — SODIUM CHLORIDE, SODIUM LACTATE, POTASSIUM CHLORIDE, AND CALCIUM CHLORIDE: .6; .31; .03; .02 INJECTION, SOLUTION INTRAVENOUS at 10:26

## 2025-05-08 RX ADMIN — FENTANYL CITRATE 25 MCG: 50 INJECTION INTRAMUSCULAR; INTRAVENOUS at 10:03

## 2025-05-08 RX ADMIN — HEPARIN SODIUM 6000 UNITS: 1000 INJECTION, SOLUTION INTRAVENOUS; SUBCUTANEOUS at 09:47

## 2025-05-08 RX ADMIN — PROPOFOL 30 MG: 10 INJECTION, EMULSION INTRAVENOUS at 08:53

## 2025-05-08 RX ADMIN — PROPOFOL 50 MCG/KG/MIN: 10 INJECTION, EMULSION INTRAVENOUS at 08:54

## 2025-05-08 RX ADMIN — CLOPIDOGREL BISULFATE 300 MG: 75 TABLET ORAL at 11:34

## 2025-05-08 RX ADMIN — FENTANYL CITRATE 50 MCG: 50 INJECTION INTRAMUSCULAR; INTRAVENOUS at 10:59

## 2025-05-08 RX ADMIN — ACETAMINOPHEN 975 MG: 325 TABLET, FILM COATED ORAL at 07:35

## 2025-05-08 RX ADMIN — FENTANYL CITRATE 50 MCG: 50 INJECTION INTRAMUSCULAR; INTRAVENOUS at 10:50

## 2025-05-08 RX ADMIN — CHLORHEXIDINE GLUCONATE 15 ML: 1.2 SOLUTION ORAL at 07:35

## 2025-05-08 RX ADMIN — LIDOCAINE HYDROCHLORIDE 60 MG: 20 INJECTION, SOLUTION EPIDURAL; INFILTRATION; INTRACAUDAL at 08:52

## 2025-05-08 RX ADMIN — FENTANYL CITRATE 25 MCG: 50 INJECTION INTRAMUSCULAR; INTRAVENOUS at 09:51

## 2025-05-08 RX ADMIN — ONDANSETRON 4 MG: 2 INJECTION INTRAMUSCULAR; INTRAVENOUS at 10:11

## 2025-05-08 RX ADMIN — FENTANYL CITRATE 25 MCG: 50 INJECTION INTRAMUSCULAR; INTRAVENOUS at 08:56

## 2025-05-08 NOTE — DISCHARGE INSTR - AVS FIRST PAGE
DISCHARGE INSTRUCTIONS  ARTERIOGRAM/ANGIOPLASTY/STENT    ACTIVITY: On the evening following the procedure, you should be mostly resting.  Someone should remain with you during the evening and overnight following the procedure.     On the day after your procedure, limit your activity to walking.  Avoid heavy lifting (no more than 15 lbs) for the first three days. Walking up steps and normal activities may be resumed as you feel ready.   You should not drive a car for at least two days following discharge from the hospital. You may ride in a car.   If you have any questions regarding a particular activity, please discuss with your doctor or nurse before you are discharged.    DIET:  Resume your normal diet.  Drink more water than usual for the next 24 hours.    PROCEDURE SITE: You may have a procedure site in your groin, arm, or foot.  You may have surgical glue at your procedure site.  The glue is used to cover the procedure site, assist in closure, and prevent contamination. This adhesive will darken and peel away on its own within one to two weeks. Do not pick at it.    You should shower daily.  Wash incision daily with soap and water, but do not rub or scrub the incision; rinse thoroughly and pat dry.  Do not bathe in a tub or swim for the first 2 week following your procedure or if you have any open wounds.  It is normal to have some bruising, swelling or discoloration around the procedure site.  IF increasing redness, pain, or a bulge develops, call our office immediately.    If present, you may remove the band-aid or “steri-strips” over your procedure site after two days.   If you notice any active bleeding at the site, apply pressure to the site and call our office ([phone]) or [phone].    FOLLOW UP STUDIES:  Your doctor will discuss whether further treatments or follow-up studies are necessary at your first post procedure visit.    FOLLOW UP APPOINTMENTS:  Making and keeping follow up appointments and  ultrasound tests are important to your recovery.  If you have difficulty making it to or keeping your follow up appointments, call the office.    If you have increased pain, fever >101.5, increased drainage, redness or a bad smell at your surgery site, new coldness/numbness of your arm or leg, please call us immediately and GO directly to the ER.    PLEASE CALL THE OFFICE IF YOU HAVE ANY QUESTIONS  [phone]  [phone] [address], [address], [place] [address]  [address] [place] [address]  [address] [place] [address]  [address] [facility], 1st Floor, [place] [address]  [address] [facility] [place] [address]  [address] [facility], [place] [address]  [address] [facility] [address], [address]nd Floor, [place] [address]  [address] [facility], 1st Floor, [address], [place] [address]  [address] [facility] [address], [place] [address]  [address] [facility], [address], [place] [address]

## 2025-05-08 NOTE — QUICK NOTE
Vascular Surgery Quick Note  -Pt seen in SDS per nurses request due to oozing from R groin incision site  -Mild oozing under the Tegaderm, and razor burn around incision site  -Incision is soft w/out bleeding, leakage, edema, swelling or ecchymosis  -New dressing placed is C/D/I    Mika Cruz PA-C

## 2025-05-08 NOTE — OP NOTE
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 Imaging concerning for popliteal artery stenosis. Given these findings, she was offered LLE angiogram with possible intervention.     Complications:   None    RADIOGRAPHIC FINDINGS:   Aorto-iliac segment: Patent renals and aortoiliac system without hemodynamically significant stenosis  Common femoral: Patent  SFA: Mild diffuse disease, mostly in the distal segment of the vessel  Profunda: Patent  Popliteal: Diseased P1 segment with near occlusive short segment stenosis in the P2 segment and reconstitution of the P3 segment which is patent  Tibials: Patent three vessel runoff to the foot with dominant flow via the PT    Contrast: 76 cc Visi 320   Flouro time: 23.4 min   DAP: 76.91 Gycm2     DESCRIPTION OF PROCEDURE:   The patient was brought to the operative suite and placed in supine position.   Sedation was induced and titrated to effect.  Sedation and the patient's hemodynamics were monitored by an anesthesia provider independent of the procedure throughout the case.  The patient was then prepped and draped in usual sterile fashion a timeout was performed per hospital policy correctly identifying the patient, procedure, and laterality.    The right groin was assessed via ultrasonography and a micropuncture kit was used to cannulate the common femoral artery.  Fluoroscopy was used to confirm wire trajectory.  A microsheath was used to introduce a Today Tixson wire.  The microsheath was removed and replaced with a 5 Upper sorbian sheath.  An omniflush catheter was introduced over the wire into the abdominal aorta.  An aortagram was performed which demonstrated patent renal arteries bilaterally and a patent aortoiliac segment with no hemodynamically significant stenoses. A glidewire was used to access the contralateral femoral artery.  The catheter was advanced to the region of the distal external iliac/common femoral artery.  An arteriogram was performed which demonstrated patent common femoral artery and profunda femoris.  The SFA was patent throughout its length but did have mild diffuse disease throughout the distal segment.  The proximal popliteal artery was heavily diseased with a near occlusive short segment lesion in the mid popliteal artery and reconstitution of the distal popliteal artery.  There was three-vessel runoff to the foot with dominant flow via the PT artery.    The decision was made to treat the popliteal artery.  This sheath was exchanged for a 6 Kiswahili by 55 cm sheath over a stiff angled Glidewire. The patient was administered 6000 u IV heparin.  A subinguinal Glidewire and a Mobile catheter were utilized to successfully cross the popliteal artery lesion.  Once intraluminal position was confirmed, a 4 x 60 mm  balloon was positioned with the area of stenosis in the middle and was inflated  to nominal pressure for a total of 2 minutes.  This area was post treated using a 5 x 80 mm Lutonix DCB which was inflated to nominal pressure for total of 3 minutes.  A posttreatment arteriogram was performed which demonstrated mild residual stenosis just proximal to the area that was treated, therefore, this distal segment of the SFA was treated using a 5 x 80 mm plain balloon inflated to nominal pressure for 2 minutes.  On repeat arteriogram there was significant improvement in walking with brisk flow throughout the lower leg with preserved outflow.  We opted to conclude our procedure at this time. The wire and catheter system was removed under fluoroscopy and the sheath was downsized to a short 6 Belarusian sheath. A 6/7Mynx device was utilized for closure of the arteriotomy.  The closure device unfortunately was unsuccessful. After 20 minutes of post closure digital pressure, the access site was hemostatic and no hematoma was appreciated.    The patient was then allowed to emerge from sedation.  She tolerated the procedure well with no immediate postoperative complications.  All counts were correct as reported to me.  She was taken to PACU in stable condition.  Of note, Dr. Sandra was present for the entirety of the procedure.      SIGNATURE: Mahesh Centeno MD  DATE: May 8, 2025  TIME: 10:47 AM            Vascular Quality Initiative - Peripheral Vascular Intervention     Urgency: Urgent    Functional Status:  Restricted in physically strenuous activity but ambulatory and able to carry out work of a light or sedentary nature.   Ambulation: Amb w/ assistance = ambulation requires use of cane, walker, person, etc    Leg Symptoms    Right: Asymptomatic:  documented peripheral arterial disease without symptoms of claudication or ischemic pain      Treatment of Native Artery to Maintain Bypass Patency?:  No  Left: Ischemic Rest Pain    COVID Information  COVID Symptoms Pre-Procedure: Asymptomatic    Treatment Delayed  by Pandemic: None    Access   Number of Sites: 1     Access Site 1:     Side 1: Right    Site 1: Femoral Retrograde    Access Guidance 1:U/S    Largest Sheath Size 1: 6 Fr.    Closure Device 1: MynxGrip      Number of Closure Devices: 1     Closure Device Outcome: Closure device failed         Procedure  Fluoro Time: 23.4 minutes  Contrast Volume: Visipaque 76 ml  DAP: 76.91 Gy.cm2  CO2: no  Anticoagulant: Heparin  Protamine: No  If Creatinine is > 1.2 or missing, JAYESH Prophylaxis IV saline     Treatment Details  Indication: Occlusive Disease,    Completion Assessment  Artery 1 treated: SFA+Pop   Left               Outflow: AT,PT,Peroneal: 3                       Segments treated: P1+P2                    Was this Site previously treated?: No          TASC Grade: B          Total Treated Length: 10 cm          Total Occluded Length: 0 cm          Calcification: Moderate (calcification on both sides of artery < half length of lesion)          Number of Treatment types (Devices):   2           Device 1          Treatment Type: Plain Balloon         Device 2          Treatment Type: Special Balloon,  Drug Coated Balloon                Diameter: 5 mm          Length: 80 mm          Concomitant: None          Technical result: Successful (stenosis <=30%)      None     Post Procedure  Patient currently taking: Statin, Yes      Antiplatelet Medication, Yes    Procedure Complications: No

## 2025-05-08 NOTE — ANESTHESIA PREPROCEDURE EVALUATION
Procedure:  ARTERIOGRAM (Left: Abdomen)    Relevant Problems   ANESTHESIA (within normal limits)      CARDIO   (+) Aortoiliac occlusive disease (HCC)   (+) HLD (hyperlipidemia)   (+) HTN (hypertension)   (+) PAD (peripheral artery disease) (HCC)   (-) Chest pain      ENDO   (+) Type 2 diabetes mellitus with stage 3b chronic kidney disease, with long-term current use of insulin (HCC)      GI/HEPATIC (within normal limits)      /RENAL (within normal limits)      HEMATOLOGY (within normal limits)      MUSCULOSKELETAL   (+) Osteoarthritis      NEURO/PSYCH   (+) Diabetes mellitus with neuropathy (HCC)      PULMONARY (within normal limits)        Physical Exam    Airway    Mallampati score: III  TM Distance: >3 FB  Neck ROM: full     Dental   No notable dental hx     Cardiovascular  Rhythm: regular, Rate: normal, Cardiovascular exam normal    Pulmonary  Pulmonary exam normal Breath sounds clear to auscultation    Other Findings  post-pubertal.      Anesthesia Plan  ASA Score- 3     Anesthesia Type- IV sedation with anesthesia with ASA Monitors.         Additional Monitors:     Airway Plan:     Comment: Lachelle Herndon is a 89 y.o. female with PMHx significant for HGN, DM2 on insulin c/b CKD3b, PAD, HLD presenting today for arteriogram w/ Dr. Sandra.    Plan: MAC sedation, PIVx1, standard ASA monitors. Spontaneous respirations with supplemental O2 via nasal cannula vs. Simple face mask. GA w/ LMA backup.    Anesthesia MAC sedation plan and consent discussed with patient who expressed understanding and agreement. Risks/benefits and alternatives discussed with patient including possible PONV, airway obstruction requiring ventilation augmentation, regurgitant aspiration, awareness, damage to teeth/lips/gums and possibility of rare anesthetic and procedural emergencies requiring emergent airway management.     Toa KRISHNAN D.O., have personally seen and evaluated the patient prior to anesthetic care. I have  reviewed the pre-anesthetic record, and other medical records if appropriate to the anesthetic care. If a CRNA is involved in the case, I have reviewed the CRNA assessment, if present, and agree.     .       Plan Factors-Exercise tolerance (METS): >4 METS.    Chart reviewed. EKG reviewed. Imaging results reviewed. Existing labs reviewed. Patient summary reviewed.    Patient is not a current smoker.  Patient instructed to abstain from smoking on day of procedure. Patient did not smoke on day of surgery.    Obstructive sleep apnea risk education given perioperatively.        Induction- intravenous.    Postoperative Plan-     Perioperative Resuscitation Plan - Level 1 - Full Code.       Informed Consent- Anesthetic plan and risks discussed with patient and daughter.  I personally reviewed this patient with the CRNA. Discussed and agreed on the Anesthesia Plan with the CRNA..      NPO Status:  No vitals data found for the desired time range.

## 2025-05-08 NOTE — ANESTHESIA POSTPROCEDURE EVALUATION
Post-Op Assessment Note    CV Status:  Stable    Pain management: adequate       Mental Status:  Alert and awake   Hydration Status:  Euvolemic   PONV Controlled:  Controlled   Airway Patency:  Patent     Post Op Vitals Reviewed: Yes    No anethesia notable event occurred.    Staff: Anesthesiologist, CRNA           Last Filed PACU Vitals:  Vitals Value Taken Time   Temp 37.3    Pulse 94 05/08/25 1058   /76 05/08/25 1056   Resp     SpO2 96 % 05/08/25 1058   Vitals shown include unfiled device data.

## 2025-05-08 NOTE — ANESTHESIA POSTPROCEDURE EVALUATION
Post-Op Assessment Note    CV Status:  Stable    Pain management: adequate       Mental Status:  Awake   Hydration Status:  Stable   PONV Controlled:  Controlled   Airway Patency:  Patent     Post Op Vitals Reviewed: Yes    No anethesia notable event occurred.    Staff: Anesthesiologist           Last Filed PACU Vitals:  Vitals Value Taken Time   Temp 97.7 °F (36.5 °C) 05/08/25 1130   Pulse 92 05/08/25 1142   /70 05/08/25 1133   Resp 16 05/08/25 1130   SpO2 97 % 05/08/25 1141   Vitals shown include unfiled device data.    Modified Kareem:     Vitals Value Taken Time   Activity 1 05/08/25 1130   Respiration 2 05/08/25 1130   Circulation 2 05/08/25 1130   Consciousness 2 05/08/25 1130   Oxygen Saturation 2 05/08/25 1130     Modified Kareem Score: 9

## 2025-05-12 ENCOUNTER — TELEPHONE (OUTPATIENT)
Age: 89
End: 2025-05-12

## 2025-05-12 NOTE — TELEPHONE ENCOUNTER
Caller:  Samra    Doctor: Dr. Sandra     Reason for call:  Patient's daughter calling in states her mother is supposed to have a 3 months follow up from the surgery she had completed on 5/08/2025. No notes for how appointment should be made. Please call patients daughter to schedule.    Call back#: [phone]

## 2025-05-12 NOTE — TELEPHONE ENCOUNTER
LMOM with Samra (daughter) to call back to get her mom an appointment for POST OP. In the message it says 3mo, but I'm wondering if it's supposed to be 3 weeks. I just need to have the double checked so that we can get her in the right amount of time.

## 2025-05-23 NOTE — PROGRESS NOTES
Name: Lachelle Herndon      : 1935      MRN: [id number]  Encounter Provider: Brittney Sandra DO  Encounter Date: 2025   Enc[facility]  :  Assessment & Plan  PAD (peripheral artery disease) (HCC)  Status post left lower extremity arteriogram with SFA/popliteal standard and drug-coated balloon angioplasty.  Patient reports that the left calf cramping has markedly improved.  Her primary complaint remains the left great toe/ingrown toenail.  She also complains of dry left lateral heel/foot.  I do not see any open wounds.  The foot appears warm and well-perfused.  She does have some not lifestyle limiting right calf claudication.  At this time recommend continued medical management.  She may discontinue her Plavix and continue 81 mg aspirin daily.  Will get a surveillance 3-month duplex.  Orders:    VAS ARTERIAL DUPLEX- LOWER LIMB BILATERAL; Future        History of Present Illness   HPI  Lacehlle Herndon is a 89 y.o. female who presents to the office following recent left lower extremity arteriogram with intervention.  She is accompanied by her daughter.      Groin incision was healed.   History obtained from: patient    Review of Systems   Cardiovascular:  Positive for leg swelling.     Past Medical History   Past Medical History[1]  Past Surgical History[2]  Family History[3]   reports that she has never smoked. She has never been exposed to tobacco smoke. She has never used smokeless tobacco. She reports that she does not drink alcohol and does not use drugs.  Current Outpatient Medications   Medication Instructions    acetaminophen (TYLENOL) 650 mg, Every 8 hours PRN    aspirin 81 mg, Daily    brimonidine (ALPHAGAN P) 0.1 % 1 drop, 3 times daily    Calcium Carbonate-Vit D-Min (CALCIUM 1200) 5436-6031 MG-UNIT CHEW 1 capsule, Daily    clopidogrel (PLAVIX) 75 mg, Oral, Daily    glucose blood (OneTouch Verio) test strip Check blood sugar 3 times per day.    insulin  lispro (HUMALOG) 10 Units, Subcutaneous, 3 times daily with meals, And ISS    Insulin Pen Needle 32G X 4 MM MISC Does not apply, 4 times daily    latanoprost (XALATAN) 0.005 % ophthalmic solution 1 drop, Daily at bedtime    losartan (COZAAR) 100 mg, Oral, Daily    OneTouch Delica Lancets 33G MISC Other, 3 times daily, Check blood sugar 4 times per day    simvastatin (ZOCOR) 80 mg, Oral, Daily    Tresiba FlexTouch 30 Units, Subcutaneous, Daily    Vitamin D3 5,000 Units, Daily   Allergies[4]   Medications Ordered Prior to Encounter[5]   Social History[6]     Objective   /56 (BP Location: Left arm, Patient Position: Sitting)   Pulse 78   Wt 69.9 kg (154 lb)   SpO2 100%   BMI 30.08 kg/m²      Physical Exam  Constitutional:       General: She is not in acute distress.     Appearance: She is well-developed.   HENT:      Head: Normocephalic and atraumatic.     Eyes:      General: No scleral icterus.     Conjunctiva/sclera: Conjunctivae normal.     Neck:      Trachea: No tracheal deviation.     Cardiovascular:      Rate and Rhythm: Normal rate and regular rhythm.      Pulses:           Posterior tibial pulses are detected w/ Doppler on the right side and detected w/ Doppler on the left side.   Pulmonary:      Effort: Pulmonary effort is normal.   Abdominal:      Palpations: Abdomen is soft. Mass: no appreciable aortic pulsation/aneurysm.     Musculoskeletal:         General: Normal range of motion.      Cervical back: Normal range of motion and neck supple.     Skin:     General: Skin is warm and dry.     Neurological:      Mental Status: She is alert and oriented to person, place, and time.     Psychiatric:         Mood and Affect: Mood normal.         Behavior: Behavior normal.         Thought Content: Thought content normal.         Administrative Statements   I have spent a total time of 30 minutes in caring for this patient on the day of the visit/encounter including Diagnostic results, Prognosis, Risks and  benefits of tx options, Instructions for management, Patient and family education, Importance of tx compliance, Risk factor reductions, Impressions, Counseling / Coordination of care, Documenting in the medical record, Reviewing/placing orders in the medical record (including tests, medications, and/or procedures), and Obtaining or reviewing history  .       [1]   Past Medical History:  Diagnosis Date    Aorto-iliac disease (HCC)     Arthritis     Carpal tunnel syndrome     Choledocholithiasis     resolved 3/6/2015    Chronic pain disorder     Diabetes mellitus (HCC) 1970’s    Diverticulosis     DM (diabetes mellitus) (HCC)     HLD (hyperlipidemia)     HTN (hypertension)     Hypertension     Inguinal hernia     PAD (peripheral artery disease) (HCC)     Sciatica     Visual impairment     Wears glasses    [2]   Past Surgical History:  Procedure Laterality Date    ESOPHAGOGASTRODUODENOSCOPY      resolved 1/2002    HEMORRHOID SURGERY      resolved 2/1999    IR LOWER EXTREMITY ANGIOGRAM  5/8/2025    NEUROPLASTY / TRANSPOSITION MEDIAN NERVE AT CARPAL TUNNEL Right     last assessed 8/10/2012    ID SLCTV CATHJ 3RD+ ORD SLCTV ABDL PEL/LXTR BRNCH Left 5/8/2025    Procedure: ARTERIOGRAM left leg with balloon angioplasty above knee popliteal artery with ultrasound guided right femoral access;  Surgeon: Brittney Sandra DO;  Location: [facility];  Service: Vascular    RECTAL SURGERY     [3]   Family History  Problem Relation Name Age of Onset    Heart attack Mother Elina Vallejo         MI    Coronary artery disease Mother Elina Vallejo     Diabetes Mother Elina Vallejo         DM    Hypertension Mother Elina Genevieve     Stroke Mother Elina Genevieve         syndrome    Stroke Father Newton Genevieve         syndrome   [4] No Known Allergies  [5]   Current Outpatient Medications on File Prior to Visit   Medication Sig Dispense Refill    acetaminophen (TYLENOL) 650 mg CR tablet Take 650 mg by mouth every 8 (eight) hours as needed for mild pain       aspirin 81 mg chewable tablet Chew 81 mg in the morning.      brimonidine (ALPHAGAN P) 0.1 % Administer 1 drop to both eyes in the morning and 1 drop in the evening and 1 drop before bedtime.      Calcium Carbonate-Vit D-Min (CALCIUM 1200) 2778-3569 MG-UNIT CHEW Chew 1 capsule in the morning      Cholecalciferol (VITAMIN D3) 5000 UNIT/ML LIQD Take 5,000 Units by mouth in the morning      clopidogrel (Plavix) 75 mg tablet Take 1 tablet (75 mg total) by mouth daily Do not start before May 9, 2025. 30 tablet 0    latanoprost (XALATAN) 0.005 % ophthalmic solution Administer 1 drop to both eyes daily at bedtime      losartan (COZAAR) 100 MG tablet TAKE 1 TABLET BY MOUTH EVERY DAY 90 tablet 1    simvastatin (ZOCOR) 80 mg tablet TAKE 1 TABLET BY MOUTH EVERY DAY 90 tablet 1    glucose blood (OneTouch Verio) test strip Check blood sugar 3 times per day. 300 strip 3    insulin degludec (Tresiba FlexTouch) 100 units/mL injection pen Inject 30 Units under the skin daily (Patient taking differently: Inject 30 Units under the skin daily in the early morning) 27 mL 1    insulin lispro (HumaLOG) 100 units/mL injection pen Inject 10 Units under the skin 3 (three) times a day with meals And ISS 30 mL 1    Insulin Pen Needle 32G X 4 MM MISC Use 4 (four) times a day 400 each 3    OneTouch Delica Lancets 33G MISC by Other route 3 (three) times a day Check blood sugar 4 times per day 300 each 3     No current facility-administered medications on file prior to visit.   [6]   Social History  Tobacco Use    Smoking status: Never     Passive exposure: Never    Smokeless tobacco: Never   Vaping Use    Vaping status: Never Used   Substance and Sexual Activity    Alcohol use: No    Drug use: No    Sexual activity: Never

## 2025-05-27 ENCOUNTER — OFFICE VISIT (OUTPATIENT)
Dept: VASCULAR SURGERY | Facility: CLINIC | Age: 89
End: 2025-05-27
Payer: COMMERCIAL

## 2025-05-27 VITALS
WEIGHT: 154 LBS | OXYGEN SATURATION: 100 % | HEART RATE: 78 BPM | BODY MASS INDEX: 30.08 KG/M2 | DIASTOLIC BLOOD PRESSURE: 56 MMHG | SYSTOLIC BLOOD PRESSURE: 132 MMHG

## 2025-05-27 DIAGNOSIS — I73.9 PAD (PERIPHERAL ARTERY DISEASE) (HCC): Primary | ICD-10-CM

## 2025-05-27 PROCEDURE — 99214 OFFICE O/P EST MOD 30 MIN: CPT | Performed by: SURGERY

## 2025-05-27 NOTE — ASSESSMENT & PLAN NOTE
Status post left lower extremity arteriogram with SFA/popliteal standard and drug-coated balloon angioplasty.  Patient reports that the left calf cramping has markedly improved.  Her primary complaint remains the left great toe/ingrown toenail.  She also complains of dry left lateral heel/foot.  I do not see any open wounds.  The foot appears warm and well-perfused.  She does have some not lifestyle limiting right calf claudication.  At this time recommend continued medical management.  She may discontinue her Plavix and continue 81 mg aspirin daily.  Will get a surveillance 3-month duplex.  Orders:    VAS ARTERIAL DUPLEX- LOWER LIMB BILATERAL; Future

## 2025-06-24 ENCOUNTER — APPOINTMENT (OUTPATIENT)
Dept: LAB | Facility: CLINIC | Age: 89
End: 2025-06-24
Payer: COMMERCIAL

## 2025-06-24 DIAGNOSIS — E11.22 TYPE 2 DIABETES MELLITUS WITH STAGE 3B CHRONIC KIDNEY DISEASE, WITH LONG-TERM CURRENT USE OF INSULIN (HCC): ICD-10-CM

## 2025-06-24 DIAGNOSIS — N18.32 TYPE 2 DIABETES MELLITUS WITH STAGE 3B CHRONIC KIDNEY DISEASE, WITH LONG-TERM CURRENT USE OF INSULIN (HCC): ICD-10-CM

## 2025-06-24 DIAGNOSIS — Z79.4 TYPE 2 DIABETES MELLITUS WITH STAGE 3B CHRONIC KIDNEY DISEASE, WITH LONG-TERM CURRENT USE OF INSULIN (HCC): ICD-10-CM

## 2025-06-24 DIAGNOSIS — E78.2 MIXED HYPERLIPIDEMIA: ICD-10-CM

## 2025-06-24 DIAGNOSIS — I10 PRIMARY HYPERTENSION: ICD-10-CM

## 2025-06-24 LAB
ALBUMIN SERPL BCG-MCNC: 3.9 G/DL (ref 3.5–5)
ALP SERPL-CCNC: 67 U/L (ref 34–104)
ALT SERPL W P-5'-P-CCNC: 11 U/L (ref 7–52)
ANION GAP SERPL CALCULATED.3IONS-SCNC: 8 MMOL/L (ref 4–13)
AST SERPL W P-5'-P-CCNC: 19 U/L (ref 13–39)
BILIRUB SERPL-MCNC: 0.58 MG/DL (ref 0.2–1)
BUN SERPL-MCNC: 26 MG/DL (ref 5–25)
CALCIUM SERPL-MCNC: 9.2 MG/DL (ref 8.4–10.2)
CHLORIDE SERPL-SCNC: 105 MMOL/L (ref 96–108)
CHOLEST SERPL-MCNC: 153 MG/DL (ref ?–200)
CO2 SERPL-SCNC: 28 MMOL/L (ref 21–32)
CREAT SERPL-MCNC: 1.09 MG/DL (ref 0.6–1.3)
EST. AVERAGE GLUCOSE BLD GHB EST-MCNC: 137 MG/DL
GFR SERPL CREATININE-BSD FRML MDRD: 45 ML/MIN/1.73SQ M
GLUCOSE P FAST SERPL-MCNC: 114 MG/DL (ref 65–99)
HBA1C MFR BLD: 6.4 %
HDLC SERPL-MCNC: 41 MG/DL
LDLC SERPL CALC-MCNC: 96 MG/DL (ref 0–100)
NONHDLC SERPL-MCNC: 112 MG/DL
POTASSIUM SERPL-SCNC: 4.6 MMOL/L (ref 3.5–5.3)
PROT SERPL-MCNC: 6.6 G/DL (ref 6.4–8.4)
SODIUM SERPL-SCNC: 141 MMOL/L (ref 135–147)
TRIGL SERPL-MCNC: 82 MG/DL (ref ?–150)

## 2025-06-24 PROCEDURE — 36415 COLL VENOUS BLD VENIPUNCTURE: CPT

## 2025-06-24 PROCEDURE — 83036 HEMOGLOBIN GLYCOSYLATED A1C: CPT

## 2025-06-24 PROCEDURE — 80061 LIPID PANEL: CPT

## 2025-06-24 PROCEDURE — 80053 COMPREHEN METABOLIC PANEL: CPT

## 2025-07-01 ENCOUNTER — TELEPHONE (OUTPATIENT)
Dept: ADMINISTRATIVE | Facility: OTHER | Age: 89
End: 2025-07-01

## 2025-07-01 ENCOUNTER — OFFICE VISIT (OUTPATIENT)
Dept: FAMILY MEDICINE CLINIC | Facility: CLINIC | Age: 89
End: 2025-07-01
Payer: COMMERCIAL

## 2025-07-01 VITALS
BODY MASS INDEX: 30.31 KG/M2 | HEIGHT: 60 IN | WEIGHT: 154.4 LBS | HEART RATE: 87 BPM | TEMPERATURE: 97.8 F | RESPIRATION RATE: 16 BRPM | SYSTOLIC BLOOD PRESSURE: 120 MMHG | DIASTOLIC BLOOD PRESSURE: 60 MMHG | OXYGEN SATURATION: 94 %

## 2025-07-01 DIAGNOSIS — K86.9 PANCREATIC LESION: ICD-10-CM

## 2025-07-01 DIAGNOSIS — Z79.4 TYPE 2 DIABETES MELLITUS WITH STAGE 3B CHRONIC KIDNEY DISEASE, WITH LONG-TERM CURRENT USE OF INSULIN (HCC): ICD-10-CM

## 2025-07-01 DIAGNOSIS — I10 PRIMARY HYPERTENSION: ICD-10-CM

## 2025-07-01 DIAGNOSIS — I73.9 PAD (PERIPHERAL ARTERY DISEASE) (HCC): Primary | ICD-10-CM

## 2025-07-01 DIAGNOSIS — Z23 ENCOUNTER FOR IMMUNIZATION: ICD-10-CM

## 2025-07-01 DIAGNOSIS — N18.32 TYPE 2 DIABETES MELLITUS WITH STAGE 3B CHRONIC KIDNEY DISEASE, WITH LONG-TERM CURRENT USE OF INSULIN (HCC): ICD-10-CM

## 2025-07-01 DIAGNOSIS — E78.2 MIXED HYPERLIPIDEMIA: ICD-10-CM

## 2025-07-01 DIAGNOSIS — E11.22 TYPE 2 DIABETES MELLITUS WITH STAGE 3B CHRONIC KIDNEY DISEASE, WITH LONG-TERM CURRENT USE OF INSULIN (HCC): ICD-10-CM

## 2025-07-01 PROCEDURE — 82043 UR ALBUMIN QUANTITATIVE: CPT | Performed by: FAMILY MEDICINE

## 2025-07-01 PROCEDURE — 90677 PCV20 VACCINE IM: CPT

## 2025-07-01 PROCEDURE — G2211 COMPLEX E/M VISIT ADD ON: HCPCS | Performed by: FAMILY MEDICINE

## 2025-07-01 PROCEDURE — G0009 ADMIN PNEUMOCOCCAL VACCINE: HCPCS

## 2025-07-01 PROCEDURE — 82570 ASSAY OF URINE CREATININE: CPT | Performed by: FAMILY MEDICINE

## 2025-07-01 PROCEDURE — 99214 OFFICE O/P EST MOD 30 MIN: CPT | Performed by: FAMILY MEDICINE

## 2025-07-01 NOTE — TELEPHONE ENCOUNTER
----- Message from Michael PINEDO sent at 6/30/2025  1:09 PM EDT -----  Regarding: Care Gap Request  06/30/25 1:09 PM    Hello, our patient Lachelle Herndon has had Diabetic Eye Exam completed/performed. Please assist in updating the patient chart by pulling the document from the Media Tab. The date of service is 12/17/24.     Thank you,  Michael Vo MA  [facility]

## 2025-07-01 NOTE — ASSESSMENT & PLAN NOTE
[facility] vascular surgeon.   Orders:    CBC; Future    Comprehensive metabolic panel; Future    Hemoglobin A1C; Future    Lipid panel; Future    Albumin / creatinine urine ratio

## 2025-07-01 NOTE — ASSESSMENT & PLAN NOTE
Lab Results   Component Value Date    HGBA1C 6.4 (H) 06/24/2025     Controlled. Denies hypoglycemia. Continue tresiba 30u QD and humalog 10u with meals.     Orders:    CBC; Future    Comprehensive metabolic panel; Future    Hemoglobin A1C; Future    Lipid panel; Future    Albumin / creatinine urine ratio

## 2025-07-01 NOTE — PROGRESS NOTES
"Name: Lachelle Herndon      : 1935      MRN: [id number]  Encounter Provider: Nimesh Napier MD  Encounter Date: 2025   Encounter department: [facility]  :  Assessment & Plan  PAD (peripheral artery disease) ([facility])   vascular surgeon.   Orders:    CBC; Future    Comprehensive metabolic panel; Future    Hemoglobin A1C; Future    Lipid panel; Future    Albumin / creatinine urine ratio    Type 2 diabetes mellitus with stage 3b chronic kidney disease, with long-term current use of insulin ([facility])    Lab Results   Component Value Date    HGBA1C 6.4 (H) 2025     Controlled. Denies hypoglycemia. Continue tresiba 30u QD and humalog 10u with meals.     Orders:    CBC; Future    Comprehensive metabolic panel; Future    Hemoglobin A1C; Future    Lipid panel; Future    Albumin / creatinine urine ratio    Primary hypertension  Controlled. DASH diet. Continue losartan 100mg QD.   Orders:    CBC; Future    Comprehensive metabolic panel; Future    Hemoglobin A1C; Future    Lipid panel; Future    Albumin / creatinine urine ratio    Mixed hyperlipidemia  Low fat diet. Continue simvastatin 80mg qhs.   Orders:    CBC; Future    Comprehensive metabolic panel; Future    Hemoglobin A1C; Future    Lipid panel; Future    Albumin / creatinine urine ratio    Pancreatic lesion  2025 CTA showed \"1.2 cm cystic-appearing distal pancreatic body lesion.  Postcontrast MRI/MRCP recommended for initial further evaluation.\"  Gave GI referral already. Advised pt to make an appt with GI.        Encounter for immunization    Orders:    Pneumococcal Conjugate Vaccine 20-valent (Pcv20)      Reviewed lab in 2025  HgA1C 6.4  CMP ok, GFR 45 stable  Lipid 153/82/41/96 good    Flu shot yearly.  Got covid19 vaccines and boosters.   Give PCV20 today.   Got shingrix .  Refused Dexa. Fall precautions.   RTO in 6 months.          History of Present Illness   HPI    Pt is here by herself.     PAD---[facility] vascular surgeon. S/p " ARTERIOGRAM left leg with balloon angioplasty above knee popliteal artery on 5/8/2025.   Now she is on ASA 81mg daily.   Will check duplex in 9/2025.      DM---6/2025 hgA1C 6.4 controlled.   She is on tresiba 30u QD and lispro 10u with meals.    Denies hypoglycemia recently.   Denies neuropathy.   [facility] opthalmology Dr. Sarmiento and Dr. North. Right eye cannot see. [facility]U retinal specialist also per pt.   FU podiatry Dr. Canela Q 3-4 months.     HTN----She is on losartan 100mg QD. Denies SOB, CP, headache etc. She checks BP at home occasionally.     Hyperlipidemia----she is on simvastatin 80mg qhs. Denies side effects.      Pancreatic cyst---Advised pt to follow up with GI.      She goes to [employer] exercise 4 times/week.    Pt lives by herself. Does all ADL's. Still driving and cooking.   Had children living close to her.  Denies recent falls.   Denies depression.       Review of Systems   Constitutional:  Negative for appetite change, chills and fever.   HENT:  Negative for congestion, ear pain, sinus pain and sore throat.    Eyes:  Negative for discharge and itching.   Respiratory:  Negative for apnea, cough, chest tightness, shortness of breath and wheezing.    Cardiovascular:  Negative for chest pain, palpitations and leg swelling.   Gastrointestinal:  Negative for abdominal pain, anal bleeding, constipation, diarrhea, nausea and vomiting.   Endocrine: Negative for cold intolerance, heat intolerance and polyuria.   Genitourinary:  Negative for difficulty urinating and dysuria.   Musculoskeletal:  Negative for arthralgias, back pain and myalgias.   Skin:  Negative for rash.   Neurological:  Negative for dizziness and headaches.   Psychiatric/Behavioral:  Negative for agitation.        Objective   /60 (BP Location: Left arm, Patient Position: Sitting, Cuff Size: Adult)   Pulse 87   Temp 97.8 °F (36.6 °C) (Temporal)   Resp 16   Ht 5' (1.524 m)   Wt 70 kg (154 lb 6.4 oz)   SpO2 94%   BMI 30.15 kg/m²       Physical Exam  Constitutional:       General: She is not in acute distress.     Appearance: She is well-developed.   HENT:      Head: Normocephalic.     Eyes:      General:         Right eye: No discharge.         Left eye: No discharge.      Conjunctiva/sclera: Conjunctivae normal.     Neck:      Thyroid: No thyromegaly.     Cardiovascular:      Rate and Rhythm: Normal rate and regular rhythm.      Heart sounds: Normal heart sounds. No murmur heard.     No friction rub. No gallop.   Pulmonary:      Effort: Pulmonary effort is normal. No respiratory distress.      Breath sounds: Normal breath sounds. No wheezing or rales.   Chest:      Chest wall: No tenderness.   Abdominal:      General: Bowel sounds are normal. There is no distension.      Palpations: Abdomen is soft. There is no mass.      Tenderness: There is no abdominal tenderness. There is no guarding or rebound.     Musculoskeletal:         General: No tenderness or deformity. Normal range of motion.      Cervical back: Normal range of motion.      Comments: Wear compression stocking.    Lymphadenopathy:      Cervical: No cervical adenopathy.     Neurological:      Mental Status: She is alert.

## 2025-07-01 NOTE — ASSESSMENT & PLAN NOTE
Low fat diet. Continue simvastatin 80mg qhs.   Orders:    CBC; Future    Comprehensive metabolic panel; Future    Hemoglobin A1C; Future    Lipid panel; Future    Albumin / creatinine urine ratio

## 2025-07-01 NOTE — ASSESSMENT & PLAN NOTE
Controlled. DASH diet. Continue losartan 100mg QD.   Orders:    CBC; Future    Comprehensive metabolic panel; Future    Hemoglobin A1C; Future    Lipid panel; Future    Albumin / creatinine urine ratio

## 2025-07-01 NOTE — ASSESSMENT & PLAN NOTE
"4/2/2025 CTA showed \"1.2 cm cystic-appearing distal pancreatic body lesion.  Postcontrast MRI/MRCP recommended for initial further evaluation.\"  Gave GI referral already. Advised pt to make an appt with GI.        "

## 2025-07-02 LAB
CREAT UR-MCNC: 78.5 MG/DL
MICROALBUMIN UR-MCNC: <7 MG/L

## 2025-07-02 NOTE — TELEPHONE ENCOUNTER
Upon review of the In Basket request we were able to locate, review, and update the patient chart as requested for Diabetic Eye Exam.    Any additional questions or concerns should be emailed to the Practice Liaisons via the appropriate education email address, please do not reply via In Basket.    Thank you  Mckenna Bernal MA   [facility]

## 2025-07-27 DIAGNOSIS — Z79.4 TYPE 2 DIABETES MELLITUS WITH STAGE 3B CHRONIC KIDNEY DISEASE, WITH LONG-TERM CURRENT USE OF INSULIN (HCC): ICD-10-CM

## 2025-07-27 DIAGNOSIS — N18.32 TYPE 2 DIABETES MELLITUS WITH STAGE 3B CHRONIC KIDNEY DISEASE, WITH LONG-TERM CURRENT USE OF INSULIN (HCC): ICD-10-CM

## 2025-07-27 DIAGNOSIS — E11.22 TYPE 2 DIABETES MELLITUS WITH STAGE 3B CHRONIC KIDNEY DISEASE, WITH LONG-TERM CURRENT USE OF INSULIN (HCC): ICD-10-CM

## 2025-07-29 RX ORDER — INSULIN DEGLUDEC 100 U/ML
30 INJECTION, SOLUTION SUBCUTANEOUS DAILY
Qty: 27 ML | Refills: 1 | Status: SHIPPED | OUTPATIENT
Start: 2025-07-29

## (undated) DEVICE — SPONGE SCRUB 4 PCT CHLORHEXIDINE

## (undated) DEVICE — Device

## (undated) DEVICE — MICROPUNCTURE 501

## (undated) DEVICE — CATH DIAG 5FR .035 65CM 6S OMMI-FLUSH

## (undated) DEVICE — PROBE COVER: Brand: STERILE PROBE COVER

## (undated) DEVICE — RADIFOCUS TORQUE DEVICE MULTI-TORQUE VISE: Brand: RADIFOCUS TORQUE DEVICE

## (undated) DEVICE — DISPOSABLE OR TOWEL: Brand: CARDINAL HEALTH

## (undated) DEVICE — DECANTER: Brand: UNBRANDED

## (undated) DEVICE — RADIFOCUS GLIDEWIRE: Brand: GLIDEWIRE

## (undated) DEVICE — DRESSING MEPORE FILM ADHESIVE 4 X 5IN

## (undated) DEVICE — GLOVE SRG BIOGEL 7.5

## (undated) DEVICE — FLEXOR, CHECK-FLO, INTRODUCER ANSEL MODIFICATION - WITH HIGH-FLEX DILATOR AND HYDROPHILIC COATING: Brand: FLEXOR

## (undated) DEVICE — PRESTO™ INFLATION DEVICE: Brand: PRESTO

## (undated) DEVICE — PINNACLE R/O II INTRODUCER SHEATH WITH RADIOPAQUE MARKER: Brand: PINNACLE

## (undated) DEVICE — FLUID MANAGEMENT KIT - IR

## (undated) DEVICE — ASTOUND STANDARD SURGICAL GOWN, XL: Brand: CONVERTORS

## (undated) DEVICE — CATH SUPPORT RUBICON 5FR 0.035IN 135CM STR

## (undated) DEVICE — GLOVE INDICATOR PI UNDERGLOVE SZ 8 BLUE

## (undated) DEVICE — PACK CUSTOM CARDIOVASCULAR

## (undated) DEVICE — SYRINGE KIT,PACKAGED,,150FT,MK 7(ANGIO-ARTERION, 150ML SYR KIT W/QFT,MC)(60729385): Brand: MEDRAD® MARK 7 ARTERION DISPOSABLE SYRINGE 150 ML WITH QUICK FILL TUBE